# Patient Record
Sex: MALE | Race: WHITE | Employment: FULL TIME
[De-identification: names, ages, dates, MRNs, and addresses within clinical notes are randomized per-mention and may not be internally consistent; named-entity substitution may affect disease eponyms.]

---

## 2018-12-04 ENCOUNTER — NURSE TRIAGE (OUTPATIENT)
Dept: OTHER | Facility: CLINIC | Age: 28
End: 2018-12-04

## 2018-12-04 ENCOUNTER — APPOINTMENT (OUTPATIENT)
Dept: CT IMAGING | Age: 28
End: 2018-12-04
Payer: COMMERCIAL

## 2018-12-04 ENCOUNTER — HOSPITAL ENCOUNTER (EMERGENCY)
Age: 28
Discharge: HOME | End: 2018-12-04
Attending: EMERGENCY MEDICINE
Payer: COMMERCIAL

## 2018-12-04 VITALS
BODY MASS INDEX: 36.45 KG/M2 | HEART RATE: 97 BPM | DIASTOLIC BLOOD PRESSURE: 62 MMHG | RESPIRATION RATE: 16 BRPM | HEIGHT: 78 IN | TEMPERATURE: 99.2 F | OXYGEN SATURATION: 100 % | SYSTOLIC BLOOD PRESSURE: 133 MMHG | WEIGHT: 315 LBS

## 2018-12-04 DIAGNOSIS — R16.1 SPLENOMEGALY: ICD-10-CM

## 2018-12-04 DIAGNOSIS — L03.319 CELLULITIS OF PUBIC REGION: Primary | ICD-10-CM

## 2018-12-04 DIAGNOSIS — D72.819 LEUKOPENIA, UNSPECIFIED TYPE: ICD-10-CM

## 2018-12-04 LAB
A/G RATIO: 1.3 (ref 1.1–2.2)
ALBUMIN SERPL-MCNC: 4.3 G/DL (ref 3.4–5)
ALP BLD-CCNC: 73 U/L (ref 40–129)
ALT SERPL-CCNC: 34 U/L (ref 10–40)
ANION GAP SERPL CALCULATED.3IONS-SCNC: 10 MMOL/L (ref 3–16)
AST SERPL-CCNC: 30 U/L (ref 15–37)
BASOPHILS ABSOLUTE: 0 K/UL (ref 0–0.2)
BASOPHILS RELATIVE PERCENT: 2.3 %
BILIRUB SERPL-MCNC: 1.4 MG/DL (ref 0–1)
BUN BLDV-MCNC: 13 MG/DL (ref 7–20)
CALCIUM SERPL-MCNC: 9.3 MG/DL (ref 8.3–10.6)
CHLORIDE BLD-SCNC: 103 MMOL/L (ref 99–110)
CO2: 26 MMOL/L (ref 21–32)
CREAT SERPL-MCNC: 0.8 MG/DL (ref 0.9–1.3)
EOSINOPHILS ABSOLUTE: 0 K/UL (ref 0–0.6)
EOSINOPHILS RELATIVE PERCENT: 0.5 %
GFR AFRICAN AMERICAN: >60
GFR NON-AFRICAN AMERICAN: >60
GLOBULIN: 3.4 G/DL
GLUCOSE BLD-MCNC: 106 MG/DL (ref 70–99)
HCT VFR BLD CALC: 31.6 % (ref 40.5–52.5)
HEMOGLOBIN: 10.7 G/DL (ref 13.5–17.5)
LACTIC ACID: 0.8 MMOL/L (ref 0.4–2)
LYMPHOCYTES ABSOLUTE: 0.7 K/UL (ref 1–5.1)
LYMPHOCYTES RELATIVE PERCENT: 51.2 %
MCH RBC QN AUTO: 30.1 PG (ref 26–34)
MCHC RBC AUTO-ENTMCNC: 34 G/DL (ref 31–36)
MCV RBC AUTO: 88.6 FL (ref 80–100)
MONOCYTES ABSOLUTE: 0.3 K/UL (ref 0–1.3)
MONOCYTES RELATIVE PERCENT: 22.9 %
NEUTROPHILS ABSOLUTE: 0.3 K/UL (ref 1.7–7.7)
NEUTROPHILS RELATIVE PERCENT: 23.1 %
PDW BLD-RTO: 14.7 % (ref 12.4–15.4)
PLATELET # BLD: 188 K/UL (ref 135–450)
PMV BLD AUTO: 8 FL (ref 5–10.5)
POTASSIUM REFLEX MAGNESIUM: 4.2 MMOL/L (ref 3.5–5.1)
RBC # BLD: 3.56 M/UL (ref 4.2–5.9)
SODIUM BLD-SCNC: 139 MMOL/L (ref 136–145)
TOTAL PROTEIN: 7.7 G/DL (ref 6.4–8.2)
WBC # BLD: 1.3 K/UL (ref 4–11)

## 2018-12-04 PROCEDURE — 6370000000 HC RX 637 (ALT 250 FOR IP): Performed by: PHYSICIAN ASSISTANT

## 2018-12-04 PROCEDURE — 74177 CT ABD & PELVIS W/CONTRAST: CPT

## 2018-12-04 PROCEDURE — 83605 ASSAY OF LACTIC ACID: CPT

## 2018-12-04 PROCEDURE — 6360000004 HC RX CONTRAST MEDICATION: Performed by: EMERGENCY MEDICINE

## 2018-12-04 PROCEDURE — 96366 THER/PROPH/DIAG IV INF ADDON: CPT

## 2018-12-04 PROCEDURE — 99284 EMERGENCY DEPT VISIT MOD MDM: CPT

## 2018-12-04 PROCEDURE — 6360000002 HC RX W HCPCS

## 2018-12-04 PROCEDURE — 87040 BLOOD CULTURE FOR BACTERIA: CPT

## 2018-12-04 PROCEDURE — 2580000003 HC RX 258: Performed by: PHYSICIAN ASSISTANT

## 2018-12-04 PROCEDURE — 96375 TX/PRO/DX INJ NEW DRUG ADDON: CPT

## 2018-12-04 PROCEDURE — 96367 TX/PROPH/DG ADDL SEQ IV INF: CPT

## 2018-12-04 PROCEDURE — 6360000002 HC RX W HCPCS: Performed by: PHYSICIAN ASSISTANT

## 2018-12-04 PROCEDURE — 80053 COMPREHEN METABOLIC PANEL: CPT

## 2018-12-04 PROCEDURE — 96365 THER/PROPH/DIAG IV INF INIT: CPT

## 2018-12-04 PROCEDURE — 85025 COMPLETE CBC W/AUTO DIFF WBC: CPT

## 2018-12-04 PROCEDURE — 36415 COLL VENOUS BLD VENIPUNCTURE: CPT

## 2018-12-04 RX ORDER — ONDANSETRON 2 MG/ML
INJECTION INTRAMUSCULAR; INTRAVENOUS
Status: COMPLETED
Start: 2018-12-04 | End: 2018-12-04

## 2018-12-04 RX ORDER — ONDANSETRON 2 MG/ML
4 INJECTION INTRAMUSCULAR; INTRAVENOUS ONCE
Status: COMPLETED | OUTPATIENT
Start: 2018-12-04 | End: 2018-12-04

## 2018-12-04 RX ORDER — 0.9 % SODIUM CHLORIDE 0.9 %
1000 INTRAVENOUS SOLUTION INTRAVENOUS ONCE
Status: COMPLETED | OUTPATIENT
Start: 2018-12-04 | End: 2018-12-04

## 2018-12-04 RX ORDER — CEPHALEXIN 500 MG/1
500 CAPSULE ORAL 4 TIMES DAILY
Qty: 40 CAPSULE | Refills: 0 | Status: SHIPPED | OUTPATIENT
Start: 2018-12-04 | End: 2018-12-14

## 2018-12-04 RX ORDER — KETOROLAC TROMETHAMINE 30 MG/ML
30 INJECTION, SOLUTION INTRAMUSCULAR; INTRAVENOUS ONCE
Status: COMPLETED | OUTPATIENT
Start: 2018-12-04 | End: 2018-12-04

## 2018-12-04 RX ORDER — SULFAMETHOXAZOLE AND TRIMETHOPRIM 800; 160 MG/1; MG/1
1 TABLET ORAL 2 TIMES DAILY
Qty: 20 TABLET | Refills: 0 | Status: SHIPPED | OUTPATIENT
Start: 2018-12-04 | End: 2018-12-14

## 2018-12-04 RX ORDER — ACETAMINOPHEN 325 MG/1
650 TABLET ORAL ONCE
Status: COMPLETED | OUTPATIENT
Start: 2018-12-04 | End: 2018-12-04

## 2018-12-04 RX ADMIN — SODIUM CHLORIDE 1000 ML: 9 INJECTION, SOLUTION INTRAVENOUS at 17:01

## 2018-12-04 RX ADMIN — IOPAMIDOL 75 ML: 755 INJECTION, SOLUTION INTRAVENOUS at 18:53

## 2018-12-04 RX ADMIN — KETOROLAC TROMETHAMINE 30 MG: 30 INJECTION, SOLUTION INTRAMUSCULAR at 17:01

## 2018-12-04 RX ADMIN — ONDANSETRON 4 MG: 2 INJECTION INTRAMUSCULAR; INTRAVENOUS at 17:01

## 2018-12-04 RX ADMIN — PIPERACILLIN SODIUM AND TAZOBACTAM SODIUM 3.38 G: 3; .375 INJECTION, POWDER, LYOPHILIZED, FOR SOLUTION INTRAVENOUS at 17:02

## 2018-12-04 RX ADMIN — VANCOMYCIN HYDROCHLORIDE 1000 MG: 1 INJECTION, POWDER, LYOPHILIZED, FOR SOLUTION INTRAVENOUS at 17:42

## 2018-12-04 RX ADMIN — ACETAMINOPHEN 650 MG: 325 TABLET ORAL at 17:01

## 2018-12-04 ASSESSMENT — PAIN SCALES - GENERAL
PAINLEVEL_OUTOF10: 5
PAINLEVEL_OUTOF10: 0

## 2018-12-04 ASSESSMENT — PAIN DESCRIPTION - ORIENTATION: ORIENTATION: RIGHT

## 2018-12-04 ASSESSMENT — PAIN DESCRIPTION - DESCRIPTORS: DESCRIPTORS: ACHING

## 2018-12-04 ASSESSMENT — ENCOUNTER SYMPTOMS
VOMITING: 0
ABDOMINAL PAIN: 0
SHORTNESS OF BREATH: 0
COLOR CHANGE: 1

## 2018-12-04 ASSESSMENT — PAIN DESCRIPTION - FREQUENCY: FREQUENCY: CONTINUOUS

## 2018-12-04 ASSESSMENT — PAIN DESCRIPTION - LOCATION: LOCATION: GROIN

## 2018-12-04 NOTE — LETTER
[facility] Emergency Department  Vanessa Zambrano [address]  [address]  Phone: [phone]               December 4, 2018    Patient: Chrissy Overton   YOB: 1990   Date of Visit: 12/4/2018       To Whom It May Concern:    Rex Castillo was seen and treated in our emergency department on 12/4/2018. Please excuse from work 12/4-12-6.       Sincerely,       Shasta Medrano PA-C         Signature:__________________________________

## 2018-12-05 ENCOUNTER — OFFICE VISIT (OUTPATIENT)
Dept: FAMILY MEDICINE CLINIC | Age: 28
End: 2018-12-05
Payer: COMMERCIAL

## 2018-12-05 ENCOUNTER — TELEPHONE (OUTPATIENT)
Dept: FAMILY MEDICINE CLINIC | Age: 28
End: 2018-12-05

## 2018-12-05 VITALS
SYSTOLIC BLOOD PRESSURE: 138 MMHG | WEIGHT: 315 LBS | HEIGHT: 78 IN | BODY MASS INDEX: 36.45 KG/M2 | TEMPERATURE: 98.5 F | DIASTOLIC BLOOD PRESSURE: 86 MMHG | HEART RATE: 84 BPM

## 2018-12-05 DIAGNOSIS — R16.1 SPLENOMEGALY: ICD-10-CM

## 2018-12-05 DIAGNOSIS — D70.9 NEUTROPENIA, UNSPECIFIED TYPE (HCC): Primary | ICD-10-CM

## 2018-12-05 DIAGNOSIS — D72.819 LEUKOPENIA, UNSPECIFIED TYPE: ICD-10-CM

## 2018-12-05 DIAGNOSIS — L03.319 CELLULITIS OF PUBIC REGION: ICD-10-CM

## 2018-12-05 PROCEDURE — 36415 COLL VENOUS BLD VENIPUNCTURE: CPT | Performed by: NURSE PRACTITIONER

## 2018-12-05 PROCEDURE — 99204 OFFICE O/P NEW MOD 45 MIN: CPT | Performed by: NURSE PRACTITIONER

## 2018-12-05 NOTE — PATIENT INSTRUCTIONS
feel when you first quit smokeless tobacco are uncomfortable. Your body will miss the nicotine at first, and you may feel short-tempered and grumpy. You may have trouble sleeping or concentrating. Medicine can help you deal with these symptoms. You may struggle with changing your habits and rituals. The last step is the tricky one: Be prepared for the urge to use smokeless tobacco to continue for a time. This is a lot to deal with, but keep at it. You will feel better. Follow-up care is a key part of your treatment and safety. Be sure to make and go to all appointments, and call your doctor if you are having problems. It's also a good idea to know your test results and keep a list of the medicines you take. How can you care for yourself at home? · Ask your family, friends, and coworkers for support. You have a better chance of quitting if you have help and support. · Join a support group for people who are trying to quit using smokeless tobacco.  · Set a quit date. Pick your date carefully so that it is not right in the middle of a big deadline or stressful time. After you quit, do not use smokeless tobacco even once. Get rid of all spit cups, cans, and pouches after your last use. Clean your house and your clothes so that they do not smell of tobacco.  · Learn how to be a non-user. Think about ways you can avoid those things that make you reach for tobacco.  ? Learn some ways to deal with cravings, like calling a friend or going for a walk. Cravings often pass. ? Avoid situations that put you at greatest risk for using smokeless tobacco. For some people, it is hard to spend time with friends without dipping or chewing. For others, they might skip a coffee break with coworkers who smoke or use smokeless tobacco.  ? Change your daily routine. Take a different route to work, or eat a meal in a different place. · Cut down on stress.  Calm yourself or release tension by doing an activity you enjoy, such as reading a book, taking a hot bath, or gardening. · Talk to your doctor or pharmacist about nicotine replacement therapy. You still get nicotine, but you do not use tobacco. Nicotine replacement products help you slowly reduce the amount of nicotine you need. Many of these products are available over the counter. They include nicotine patches, gum, lozenges, and inhalers. · Ask your doctor about bupropion (Wellbutrin) or varenicline (Chantix), which are prescription medicines. They do not contain nicotine. They help you by reducing withdrawal symptoms, such as stress and anxiety. · Get regular exercise. Having healthy habits will help your body move past its craving for nicotine. · Be prepared to keep trying. Most people are not successful the first few times they try to quit. Do not get mad at yourself if you use tobacco again. Make a list of things you learned, and think about when you want to try again, such as next week, next month, or next year. Where can you learn more? Go to https://mPura.Inventalator. org and sign in to your TheSedge.org account. Enter C919 in the [employer] box to learn more about \"Stopping Smokeless Tobacco Use: Care Instructions. \"     If you do not have an account, please click on the \"Sign Up Now\" link. Current as of: November 29, 2017  Content Version: 11.8  © 5834-2140 Healthwise, Incorporated. Care instructions adapted under license by [facility] ([facility]). If you have questions about a medical condition or this instruction, always ask your healthcare professional. [place] [address] any warranty or liability for your use of this information.

## 2018-12-06 LAB
ANA INTERPRETATION: NORMAL
ANTI-NUCLEAR ANTIBODY (ANA): NEGATIVE
BASOPHILS ABSOLUTE: 0 K/UL (ref 0–0.2)
BASOPHILS RELATIVE PERCENT: 0.1 %
BLOOD SMEAR REVIEW: NORMAL
EOSINOPHILS ABSOLUTE: 0 K/UL (ref 0–0.6)
EOSINOPHILS RELATIVE PERCENT: 1.4 %
FERRITIN: 526.1 NG/ML (ref 30–400)
FOLATE: 7.21 NG/ML (ref 4.78–24.2)
HAV IGM SER IA-ACNC: NORMAL
HCT VFR BLD CALC: 32.7 % (ref 40.5–52.5)
HEMATOLOGY PATH CONSULT: NORMAL
HEMATOLOGY PATH CONSULT: YES
HEMOGLOBIN: 10.8 G/DL (ref 13.5–17.5)
HEPATITIS B CORE IGM ANTIBODY: NORMAL
HEPATITIS B SURFACE ANTIGEN INTERPRETATION: NORMAL
HEPATITIS C ANTIBODY INTERPRETATION: NORMAL
HIV AG/AB: NORMAL
HIV ANTIGEN: NORMAL
HIV-1 ANTIBODY: NORMAL
HIV-2 AB: NORMAL
IRON SATURATION: 16 % (ref 20–50)
IRON: 53 UG/DL (ref 59–158)
LYMPHOCYTES ABSOLUTE: 0.7 K/UL (ref 1–5.1)
LYMPHOCYTES RELATIVE PERCENT: 61.8 %
MCH RBC QN AUTO: 30.3 PG (ref 26–34)
MCHC RBC AUTO-ENTMCNC: 33.1 G/DL (ref 31–36)
MCV RBC AUTO: 91.6 FL (ref 80–100)
MONOCYTES ABSOLUTE: 0.2 K/UL (ref 0–1.3)
MONOCYTES RELATIVE PERCENT: 19.1 %
NEUTROPHILS ABSOLUTE: 0.2 K/UL (ref 1.7–7.7)
NEUTROPHILS RELATIVE PERCENT: 17.6 %
PDW BLD-RTO: 15.2 % (ref 12.4–15.4)
PLATELET # BLD: 176 K/UL (ref 135–450)
PMV BLD AUTO: 8.9 FL (ref 5–10.5)
RBC # BLD: 3.57 M/UL (ref 4.2–5.9)
TOTAL IRON BINDING CAPACITY: 325 UG/DL (ref 260–445)
TOTAL SYPHILLIS IGG/IGM: NORMAL
TSH REFLEX: 1.19 UIU/ML (ref 0.27–4.2)
VITAMIN B-12: 623 PG/ML (ref 211–911)
WBC # BLD: 1.1 K/UL (ref 4–11)

## 2018-12-07 DIAGNOSIS — R79.0 ABNORMAL BLOOD LEVEL OF IRON: ICD-10-CM

## 2018-12-07 DIAGNOSIS — D72.9 ABNORMAL WHITE BLOOD CELL COUNT: Primary | ICD-10-CM

## 2018-12-08 LAB
CYTOMEGALOVIRUS IGG ANTIBODY: <0.2 U/ML
CYTOMEGALOVIRUS IGM ANTIBODY: <8 AU/ML
EPSTEIN BARR VIRUS NUCLEAR AB IGG: <3 U/ML (ref 0–21.9)
EPSTEIN-BARR EARLY ANTIGEN ANTIBODY: <5 U/ML (ref 0–10.9)
EPSTEIN-BARR VCA IGG: <10 U/ML (ref 0–21.9)
EPSTEIN-BARR VCA IGM: 17.5 U/ML (ref 0–43.9)
LYME, EIA: 0.55 LIV (ref 0–1.2)

## 2018-12-09 LAB
HISTOPLASMA ANTIGEN, SERUM: <2 U/ML
HISTOPLASMA INTERPETATION: NEGATIVE
ROCKY MOUNTAIN SPOTTED FEVER AB IGM,: NORMAL
ROCKY MOUNTAIN SPOTTED FEVER ANTIBODY IGG: NORMAL

## 2018-12-10 ENCOUNTER — TELEPHONE (OUTPATIENT)
Dept: FAMILY MEDICINE CLINIC | Age: 28
End: 2018-12-10

## 2018-12-10 ENCOUNTER — NURSE ONLY (OUTPATIENT)
Dept: FAMILY MEDICINE CLINIC | Age: 28
End: 2018-12-10
Payer: COMMERCIAL

## 2018-12-10 DIAGNOSIS — D72.819 LEUKOPENIA, UNSPECIFIED TYPE: Primary | ICD-10-CM

## 2018-12-10 LAB
BASOPHILS ABSOLUTE: 0 K/UL (ref 0–0.2)
BASOPHILS RELATIVE PERCENT: 2 %
BLOOD CULTURE, ROUTINE: NORMAL
CULTURE, BLOOD 2: NORMAL
EOSINOPHILS ABSOLUTE: 0 K/UL (ref 0–0.6)
EOSINOPHILS RELATIVE PERCENT: 2 %
HCT VFR BLD CALC: 32.6 % (ref 40.5–52.5)
HEMATOLOGY PATH CONSULT: NO
HEMOGLOBIN: 11 G/DL (ref 13.5–17.5)
LYMPHOCYTES ABSOLUTE: 0.8 K/UL (ref 1–5.1)
LYMPHOCYTES RELATIVE PERCENT: 61 %
MCH RBC QN AUTO: 29.8 PG (ref 26–34)
MCHC RBC AUTO-ENTMCNC: 33.7 G/DL (ref 31–36)
MCV RBC AUTO: 88.6 FL (ref 80–100)
MONOCYTES ABSOLUTE: 0.2 K/UL (ref 0–1.3)
MONOCYTES RELATIVE PERCENT: 15 %
NEUTROPHILS ABSOLUTE: 0.3 K/UL (ref 1.7–7.7)
NEUTROPHILS RELATIVE PERCENT: 20 %
PDW BLD-RTO: 14.7 % (ref 12.4–15.4)
PLATELET # BLD: 209 K/UL (ref 135–450)
PLATELET SLIDE REVIEW: ADEQUATE
PMV BLD AUTO: 8.6 FL (ref 5–10.5)
RBC # BLD: 3.68 M/UL (ref 4.2–5.9)
RBC # BLD: NORMAL 10*6/UL
SLIDE REVIEW: ABNORMAL
WBC # BLD: 1.3 K/UL (ref 4–11)

## 2018-12-10 PROCEDURE — 36415 COLL VENOUS BLD VENIPUNCTURE: CPT | Performed by: NURSE PRACTITIONER

## 2018-12-11 NOTE — TELEPHONE ENCOUNTER
Spoke w/ patient, he wanted to know more about his blood work and what it was showing today vs the 4th. He wanting to know if the blood work indicated signs of cancer. Informed patient of what results we had, informed him he would have to defer additional testing to hematology as I do not have any results showing he has cancer in the blood work or the CT scan - informed him to bring a list of questions to his appointments     Discussed with patient if the hematologist did think it was a form of cancer, they would be the one to order additional testing via either additional blood work, scans and or bx in order to make that determination. Asked pt how his abscess was doing - pt stated it was slowly healing but overall doing some better.

## 2018-12-13 ENCOUNTER — OFFICE VISIT (OUTPATIENT)
Dept: FAMILY MEDICINE CLINIC | Age: 28
End: 2018-12-13
Payer: COMMERCIAL

## 2018-12-13 ENCOUNTER — HOSPITAL ENCOUNTER (OUTPATIENT)
Age: 28
Discharge: HOME | End: 2018-12-13
Payer: COMMERCIAL

## 2018-12-13 VITALS
OXYGEN SATURATION: 100 % | HEIGHT: 78 IN | WEIGHT: 315 LBS | SYSTOLIC BLOOD PRESSURE: 144 MMHG | DIASTOLIC BLOOD PRESSURE: 85 MMHG | HEART RATE: 115 BPM | BODY MASS INDEX: 36.45 KG/M2

## 2018-12-13 DIAGNOSIS — L02.214 CUTANEOUS ABSCESS OF GROIN: ICD-10-CM

## 2018-12-13 DIAGNOSIS — D72.819 LEUKOPENIA, UNSPECIFIED TYPE: Primary | ICD-10-CM

## 2018-12-13 DIAGNOSIS — R16.1 SPLENOMEGALY: ICD-10-CM

## 2018-12-13 DIAGNOSIS — L03.319 CELLULITIS OF PUBIC REGION: ICD-10-CM

## 2018-12-13 LAB — TSH SERPL DL<=0.05 MIU/L-ACNC: 0.57 UIU/ML (ref 0.27–4.2)

## 2018-12-13 PROCEDURE — 86431 RHEUMATOID FACTOR QUANT: CPT

## 2018-12-13 PROCEDURE — 84165 PROTEIN E-PHORESIS SERUM: CPT

## 2018-12-13 PROCEDURE — 36415 COLL VENOUS BLD VENIPUNCTURE: CPT

## 2018-12-13 PROCEDURE — 99212 OFFICE O/P EST SF 10 MIN: CPT | Performed by: NURSE PRACTITIONER

## 2018-12-13 PROCEDURE — 86038 ANTINUCLEAR ANTIBODIES: CPT

## 2018-12-13 PROCEDURE — 84155 ASSAY OF PROTEIN SERUM: CPT

## 2018-12-13 PROCEDURE — 84238 ASSAY NONENDOCRINE RECEPTOR: CPT

## 2018-12-13 PROCEDURE — 82525 ASSAY OF COPPER: CPT

## 2018-12-13 PROCEDURE — 84443 ASSAY THYROID STIM HORMONE: CPT

## 2018-12-13 ASSESSMENT — ENCOUNTER SYMPTOMS
ROS SKIN COMMENTS: ABSCESS
EYES NEGATIVE: 1
GASTROINTESTINAL NEGATIVE: 1
RESPIRATORY NEGATIVE: 1

## 2018-12-13 ASSESSMENT — PATIENT HEALTH QUESTIONNAIRE - PHQ9
SUM OF ALL RESPONSES TO PHQ QUESTIONS 1-9: 0
SUM OF ALL RESPONSES TO PHQ9 QUESTIONS 1 & 2: 0
SUM OF ALL RESPONSES TO PHQ QUESTIONS 1-9: 0
1. LITTLE INTEREST OR PLEASURE IN DOING THINGS: 0
2. FEELING DOWN, DEPRESSED OR HOPELESS: 0

## 2018-12-13 NOTE — PROGRESS NOTES
Hearing, tympanic membrane and ear canal normal.   Nose: Nose normal.   Mouth/Throat: Uvula is midline, oropharynx is clear and moist and mucous membranes are normal.   Eyes: Conjunctivae and lids are normal.   Neck: Neck supple. Cardiovascular: Normal rate, regular rhythm, normal heart sounds and normal pulses. Pulmonary/Chest: Effort normal and breath sounds normal. No accessory muscle usage. No respiratory distress. Abdominal: Soft. Normal appearance and bowel sounds are normal. There is no tenderness. obese   Lymphadenopathy:        Head (right side): No submental and no submandibular adenopathy present. Head (left side): No submental and no submandibular adenopathy present. He has no cervical adenopathy. Neurological: He is alert and oriented to person, place, and time. Skin: Skin is warm and dry. No lesion and no rash noted. Psychiatric: He has a normal mood and affect. His speech is normal and behavior is normal. Cognition and memory are normal.       BP (!) 144/85 (Site: Left Upper Arm, Position: Sitting, Cuff Size: Large Adult)   Pulse 115   Ht 6' 6\" (1.981 m)   Wt (!) 430 lb (195 kg)   SpO2 100%   BMI 49.69 kg/m²   Body mass index is 49.69 kg/m².     BP Readings from Last 2 Encounters:   12/13/18 (!) 144/85   12/05/18 138/86       Wt Readings from Last 3 Encounters:   12/13/18 (!) 430 lb (195 kg)   12/05/18 (!) 434 lb (196.9 kg)   12/04/18 (!) 380 lb (172.4 kg)       Lab Review   Nurse Only on 12/10/2018   Component Date Value    WBC 12/10/2018 1.3*    RBC 12/10/2018 3.68*    Hemoglobin 12/10/2018 11.0*    Hematocrit 12/10/2018 32.6*    MCV 12/10/2018 88.6     MCH 12/10/2018 29.8     MCHC 12/10/2018 33.7     RDW 12/10/2018 14.7     Platelets 29/56/4431 209     MPV 12/10/2018 8.6     PLATELET SLIDE REVIEW 12/10/2018 Adequate     SLIDE REVIEW 12/10/2018 see below     Path Consult 12/10/2018 No     Neutrophils % 12/10/2018 20.0     Lymphocytes % 12/10/2018

## 2018-12-14 LAB
ALBUMIN SERPL-MCNC: 3.8 G/DL (ref 3.1–4.9)
ALPHA-1-GLOBULIN: 0.3 G/DL (ref 0.2–0.4)
ALPHA-2-GLOBULIN: 0.6 G/DL (ref 0.4–1.1)
ANA INTERPRETATION: NORMAL
ANTI-NUCLEAR ANTIBODY (ANA): NEGATIVE
BETA GLOBULIN: 1.2 G/DL (ref 0.9–1.6)
GAMMA GLOBULIN: 1.7 G/DL (ref 0.6–1.8)
RHEUMATOID FACTOR: <10 IU/ML
SPE/IFE INTERPRETATION: NORMAL
TOTAL PROTEIN: 7.6 G/DL (ref 6.4–8.2)

## 2018-12-15 LAB — SOLUBLE TRANSFERRIN RECEPT: 11.4 MG/L (ref 2.2–5)

## 2018-12-17 LAB — COPPER: 165 UG/DL (ref 70–140)

## 2018-12-19 ENCOUNTER — HOSPITAL ENCOUNTER (OUTPATIENT)
Age: 28
Discharge: HOME | End: 2018-12-19
Payer: COMMERCIAL

## 2018-12-19 ENCOUNTER — HOSPITAL ENCOUNTER (OUTPATIENT)
Dept: CT IMAGING | Age: 28
Discharge: HOME | End: 2018-12-19
Payer: COMMERCIAL

## 2018-12-19 VITALS
HEART RATE: 85 BPM | DIASTOLIC BLOOD PRESSURE: 97 MMHG | OXYGEN SATURATION: 93 % | SYSTOLIC BLOOD PRESSURE: 175 MMHG | RESPIRATION RATE: 19 BRPM

## 2018-12-19 DIAGNOSIS — D72.819 CHRONIC LEUKOPENIA: ICD-10-CM

## 2018-12-19 LAB
INR BLD: 1.09 (ref 0.86–1.14)
PROTHROMBIN TIME: 12.4 SEC (ref 9.8–13)

## 2018-12-19 PROCEDURE — 88311 DECALCIFY TISSUE: CPT

## 2018-12-19 PROCEDURE — 88313 SPECIAL STAINS GROUP 2: CPT

## 2018-12-19 PROCEDURE — 88184 FLOWCYTOMETRY/ TC 1 MARKER: CPT

## 2018-12-19 PROCEDURE — 88185 FLOWCYTOMETRY/TC ADD-ON: CPT

## 2018-12-19 PROCEDURE — 88305 TISSUE EXAM BY PATHOLOGIST: CPT

## 2018-12-19 PROCEDURE — 77012 CT SCAN FOR NEEDLE BIOPSY: CPT

## 2018-12-19 PROCEDURE — 88341 IMHCHEM/IMCYTCHM EA ADD ANTB: CPT

## 2018-12-19 PROCEDURE — 85610 PROTHROMBIN TIME: CPT

## 2018-12-19 PROCEDURE — 88342 IMHCHEM/IMCYTCHM 1ST ANTB: CPT

## 2018-12-19 PROCEDURE — 2709999900 CT BIOPSY BONE MARROW

## 2018-12-19 PROCEDURE — 88360 TUMOR IMMUNOHISTOCHEM/MANUAL: CPT

## 2018-12-19 PROCEDURE — 36415 COLL VENOUS BLD VENIPUNCTURE: CPT

## 2018-12-20 ASSESSMENT — ENCOUNTER SYMPTOMS
GASTROINTESTINAL NEGATIVE: 1
EYES NEGATIVE: 1
RESPIRATORY NEGATIVE: 1

## 2019-01-03 ENCOUNTER — TELEPHONE (OUTPATIENT)
Dept: FAMILY MEDICINE CLINIC | Age: 29
End: 2019-01-03

## 2019-01-03 RX ORDER — SULFAMETHOXAZOLE AND TRIMETHOPRIM 800; 160 MG/1; MG/1
1 TABLET ORAL 2 TIMES DAILY
Qty: 20 TABLET | Refills: 0 | Status: SHIPPED | OUTPATIENT
Start: 2019-01-03 | End: 2019-01-13

## 2019-01-04 ENCOUNTER — TELEPHONE (OUTPATIENT)
Dept: FAMILY MEDICINE CLINIC | Age: 29
End: 2019-01-04

## 2019-02-22 ENCOUNTER — APPOINTMENT (OUTPATIENT)
Dept: GENERAL RADIOLOGY | Age: 29
End: 2019-02-22
Payer: COMMERCIAL

## 2019-02-22 ENCOUNTER — HOSPITAL ENCOUNTER (EMERGENCY)
Age: 29
Discharge: HOME | End: 2019-02-22
Attending: EMERGENCY MEDICINE
Payer: COMMERCIAL

## 2019-02-22 ENCOUNTER — APPOINTMENT (OUTPATIENT)
Dept: CT IMAGING | Age: 29
End: 2019-02-22
Payer: COMMERCIAL

## 2019-02-22 VITALS
BODY MASS INDEX: 36.45 KG/M2 | DIASTOLIC BLOOD PRESSURE: 81 MMHG | HEART RATE: 84 BPM | SYSTOLIC BLOOD PRESSURE: 136 MMHG | HEIGHT: 78 IN | WEIGHT: 315 LBS | TEMPERATURE: 98.1 F | OXYGEN SATURATION: 100 % | RESPIRATION RATE: 20 BRPM

## 2019-02-22 DIAGNOSIS — S39.91XA BLUNT ABDOMINAL TRAUMA, INITIAL ENCOUNTER: ICD-10-CM

## 2019-02-22 DIAGNOSIS — R10.12 ABDOMINAL PAIN, LEFT UPPER QUADRANT: Primary | ICD-10-CM

## 2019-02-22 DIAGNOSIS — D72.819 LEUKOPENIA, UNSPECIFIED TYPE: ICD-10-CM

## 2019-02-22 DIAGNOSIS — R16.1 SPLENOMEGALY: ICD-10-CM

## 2019-02-22 LAB
A/G RATIO: 1.4 (ref 1.1–2.2)
ALBUMIN SERPL-MCNC: 4.7 G/DL (ref 3.4–5)
ALP BLD-CCNC: 63 U/L (ref 40–129)
ALT SERPL-CCNC: 49 U/L (ref 10–40)
AMYLASE: 32 U/L (ref 25–115)
ANION GAP SERPL CALCULATED.3IONS-SCNC: 12 MMOL/L (ref 3–16)
AST SERPL-CCNC: 49 U/L (ref 15–37)
BASOPHILS ABSOLUTE: 0.1 K/UL (ref 0–0.2)
BASOPHILS RELATIVE PERCENT: 2.5 %
BILIRUB SERPL-MCNC: 0.7 MG/DL (ref 0–1)
BUN BLDV-MCNC: 16 MG/DL (ref 7–20)
CALCIUM SERPL-MCNC: 9.5 MG/DL (ref 8.3–10.6)
CHLORIDE BLD-SCNC: 105 MMOL/L (ref 99–110)
CO2: 26 MMOL/L (ref 21–32)
CREAT SERPL-MCNC: 0.8 MG/DL (ref 0.9–1.3)
EOSINOPHILS ABSOLUTE: 0 K/UL (ref 0–0.6)
EOSINOPHILS RELATIVE PERCENT: 1.2 %
GFR AFRICAN AMERICAN: >60
GFR NON-AFRICAN AMERICAN: >60
GLOBULIN: 3.4 G/DL
GLUCOSE BLD-MCNC: 122 MG/DL (ref 70–99)
HCT VFR BLD CALC: 39.2 % (ref 40.5–52.5)
HEMOGLOBIN: 12.9 G/DL (ref 13.5–17.5)
LIPASE: 22 U/L (ref 13–60)
LYMPHOCYTES ABSOLUTE: 1 K/UL (ref 1–5.1)
LYMPHOCYTES RELATIVE PERCENT: 40.9 %
MCH RBC QN AUTO: 28.5 PG (ref 26–34)
MCHC RBC AUTO-ENTMCNC: 32.9 G/DL (ref 31–36)
MCV RBC AUTO: 86.6 FL (ref 80–100)
MONOCYTES ABSOLUTE: 0.3 K/UL (ref 0–1.3)
MONOCYTES RELATIVE PERCENT: 10.5 %
NEUTROPHILS ABSOLUTE: 1.1 K/UL (ref 1.7–7.7)
NEUTROPHILS RELATIVE PERCENT: 44.9 %
PDW BLD-RTO: 14.4 % (ref 12.4–15.4)
PLATELET # BLD: 226 K/UL (ref 135–450)
PMV BLD AUTO: 8 FL (ref 5–10.5)
POTASSIUM SERPL-SCNC: 4 MMOL/L (ref 3.5–5.1)
RBC # BLD: 4.52 M/UL (ref 4.2–5.9)
SODIUM BLD-SCNC: 143 MMOL/L (ref 136–145)
TOTAL PROTEIN: 8.1 G/DL (ref 6.4–8.2)
WBC # BLD: 2.4 K/UL (ref 4–11)

## 2019-02-22 PROCEDURE — 82150 ASSAY OF AMYLASE: CPT

## 2019-02-22 PROCEDURE — 99284 EMERGENCY DEPT VISIT MOD MDM: CPT

## 2019-02-22 PROCEDURE — 74177 CT ABD & PELVIS W/CONTRAST: CPT

## 2019-02-22 PROCEDURE — 85025 COMPLETE CBC W/AUTO DIFF WBC: CPT

## 2019-02-22 PROCEDURE — 83690 ASSAY OF LIPASE: CPT

## 2019-02-22 PROCEDURE — 36415 COLL VENOUS BLD VENIPUNCTURE: CPT

## 2019-02-22 PROCEDURE — 80053 COMPREHEN METABOLIC PANEL: CPT

## 2019-02-22 PROCEDURE — 6360000004 HC RX CONTRAST MEDICATION: Performed by: EMERGENCY MEDICINE

## 2019-02-22 PROCEDURE — 73060 X-RAY EXAM OF HUMERUS: CPT

## 2019-02-22 RX ADMIN — IOPAMIDOL 75 ML: 755 INJECTION, SOLUTION INTRAVENOUS at 18:05

## 2019-02-22 ASSESSMENT — PAIN SCALES - GENERAL
PAINLEVEL_OUTOF10: 3
PAINLEVEL_OUTOF10: 3

## 2019-02-22 ASSESSMENT — PAIN DESCRIPTION - FREQUENCY
FREQUENCY: INTERMITTENT
FREQUENCY: INTERMITTENT

## 2019-02-22 ASSESSMENT — PAIN DESCRIPTION - LOCATION
LOCATION: ABDOMEN
LOCATION: ABDOMEN

## 2019-02-22 ASSESSMENT — PAIN DESCRIPTION - PAIN TYPE
TYPE: ACUTE PAIN
TYPE: ACUTE PAIN

## 2019-02-22 ASSESSMENT — PAIN DESCRIPTION - ORIENTATION
ORIENTATION: LEFT;UPPER
ORIENTATION: LEFT;UPPER

## 2019-02-22 ASSESSMENT — PAIN - FUNCTIONAL ASSESSMENT: PAIN_FUNCTIONAL_ASSESSMENT: 0-10

## 2019-02-22 ASSESSMENT — PAIN DESCRIPTION - DESCRIPTORS
DESCRIPTORS: ACHING;DULL;SHARP;DISCOMFORT
DESCRIPTORS: ACHING;DISCOMFORT

## 2019-03-05 ENCOUNTER — TELEPHONE (OUTPATIENT)
Dept: FAMILY MEDICINE CLINIC | Age: 29
End: 2019-03-05

## 2019-04-04 ENCOUNTER — HOSPITAL ENCOUNTER (EMERGENCY)
Age: 29
Discharge: HOME | End: 2019-04-04
Attending: EMERGENCY MEDICINE

## 2019-04-04 VITALS
BODY MASS INDEX: 36.45 KG/M2 | WEIGHT: 315 LBS | HEART RATE: 75 BPM | SYSTOLIC BLOOD PRESSURE: 147 MMHG | DIASTOLIC BLOOD PRESSURE: 82 MMHG | HEIGHT: 78 IN | OXYGEN SATURATION: 100 % | TEMPERATURE: 98.6 F | RESPIRATION RATE: 16 BRPM

## 2019-04-04 DIAGNOSIS — T24.231A PARTIAL THICKNESS BURN OF RIGHT LOWER LEG, INITIAL ENCOUNTER: Primary | ICD-10-CM

## 2019-04-04 PROCEDURE — 90471 IMMUNIZATION ADMIN: CPT

## 2019-04-04 PROCEDURE — 6360000002 HC RX W HCPCS

## 2019-04-04 PROCEDURE — 6370000000 HC RX 637 (ALT 250 FOR IP): Performed by: EMERGENCY MEDICINE

## 2019-04-04 PROCEDURE — 90715 TDAP VACCINE 7 YRS/> IM: CPT

## 2019-04-04 PROCEDURE — 99282 EMERGENCY DEPT VISIT SF MDM: CPT

## 2019-04-04 RX ORDER — CEPHALEXIN 500 MG/1
500 CAPSULE ORAL ONCE
Status: COMPLETED | OUTPATIENT
Start: 2019-04-04 | End: 2019-04-04

## 2019-04-04 RX ORDER — SULFAMETHOXAZOLE AND TRIMETHOPRIM 800; 160 MG/1; MG/1
1 TABLET ORAL ONCE
Status: COMPLETED | OUTPATIENT
Start: 2019-04-04 | End: 2019-04-04

## 2019-04-04 RX ORDER — CEPHALEXIN 500 MG/1
500 CAPSULE ORAL ONCE
Status: DISCONTINUED | OUTPATIENT
Start: 2019-04-04 | End: 2019-04-04 | Stop reason: HOSPADM

## 2019-04-04 RX ORDER — SULFAMETHOXAZOLE AND TRIMETHOPRIM 800; 160 MG/1; MG/1
1 TABLET ORAL 2 TIMES DAILY
Qty: 20 TABLET | Refills: 0 | Status: SHIPPED | OUTPATIENT
Start: 2019-04-04 | End: 2019-04-14

## 2019-04-04 RX ORDER — SULFAMETHOXAZOLE AND TRIMETHOPRIM 200; 40 MG/5ML; MG/5ML
160 SUSPENSION ORAL ONCE
Status: DISCONTINUED | OUTPATIENT
Start: 2019-04-04 | End: 2019-04-04

## 2019-04-04 RX ADMIN — SULFAMETHOXAZOLE AND TRIMETHOPRIM 1 TABLET: 800; 160 TABLET ORAL at 01:36

## 2019-04-04 RX ADMIN — TETANUS TOXOID, REDUCED DIPHTHERIA TOXOID AND ACELLULAR PERTUSSIS VACCINE, ADSORBED 0.5 ML: 5; 2.5; 8; 8; 2.5 SUSPENSION INTRAMUSCULAR at 01:33

## 2019-04-04 RX ADMIN — CEPHALEXIN 1000 MG: 500 CAPSULE ORAL at 01:33

## 2019-04-04 ASSESSMENT — PAIN DESCRIPTION - ORIENTATION: ORIENTATION: RIGHT;LOWER

## 2019-04-04 ASSESSMENT — PAIN DESCRIPTION - DESCRIPTORS: DESCRIPTORS: BURNING

## 2019-04-04 ASSESSMENT — PAIN DESCRIPTION - PAIN TYPE: TYPE: ACUTE PAIN

## 2019-04-04 ASSESSMENT — PAIN SCALES - GENERAL: PAINLEVEL_OUTOF10: 8

## 2019-04-04 ASSESSMENT — PAIN DESCRIPTION - LOCATION: LOCATION: LEG

## 2019-04-04 NOTE — ED PROVIDER NOTES
Triage Chief Complaint:   Wound Infection      [place]:  Andreia Campbell II is a 34 y.o. male that presents with a burn to the right lower extremity. Patient burned his leg yesterday and is concerned about infection. There is some surrounding erythema. His tetanus immunizations are not up-to-date. Patient is not immunocompromised has asthma as his only ongoing medical problem. 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 No pertinent family history.   Social History     Socioeconomic History    Marital status:      Spouse name: Not on file    Number of children: Not on file    Years of education: Not on file    Highest education level: Not on file   Occupational History    Not on file   Social Needs    Financial resource strain: Not on file    Food insecurity:     Worry: Not on file     Inability: Not on file    Transportation needs:     Medical: Not on file     Non-medical: Not on file   Tobacco Use    Smoking status: Never Smoker    Smokeless tobacco: Current User     Types: Chew   Substance and Sexual Activity    Alcohol use: Yes     Comment: occassionally    Drug use: No    Sexual activity: Yes     Partners: Female   Lifestyle    Physical activity:     Days per week: Not on file     Minutes per session: Not on file    Stress: Not on file   Relationships    Social connections:     Talks on phone: Not on file     Gets together: Not on file     Attends Buddhist service: Not on file     Active member of club or organization: Not on file     Attends meetings of clubs or organizations: Not on file     Relationship status: Not on file    Intimate partner violence: Fear of current or ex partner: Not on file     Emotionally abused: Not on file     Physically abused: Not on file     Forced sexual activity: Not on file   Other Topics Concern    Not on file   Social History Narrative    Not on file     Current Facility-Administered Medications   Medication Dose Route Frequency Provider Last Rate Last Dose    cephALEXin (KEFLEX) capsule 500 mg  500 mg Oral Once [place] Roller., MD        sulfamethoxazole-trimethoprim (BACTRIM DS;SEPTRA DS) 800-160 MG per tablet 1 tablet  1 tablet Oral Once Rochester Roller., MD        Tdap (ADACEL) injection 0.5 mL  0.5 mL Intramuscular Once Rochester Roller., MD         Current Outpatient Medications   Medication Sig Dispense Refill    sulfamethoxazole-trimethoprim (BACTRIM DS) 800-160 MG per tablet Take 1 tablet by mouth 2 times daily for 10 days 20 tablet 0    ibuprofen (ADVIL;MOTRIN) 800 MG tablet Take 1 tablet by mouth every 6 hours as needed for Pain 20 tablet 3    Acetaminophen (TYLENOL EXTRA STRENGTH PO) Take 1,000 mg by mouth daily as needed       No Known Allergies  Nursing Notes Reviewed    Physical Exam:  ED Triage Vitals [04/04/19 0120]   Enc Vitals Group      BP (!) 147/82      Pulse 75      Resp 16      Temp 98.6 °F (37 °C)      Temp Source Oral      SpO2 100 %      Weight (!) 379 lb (171.9 kg)      Height 6' 6\" (1.981 m)      Head Circumference       Peak Flow       Pain Score       Pain Loc       Pain Edu? Excl. in [address]? GENERAL APPEARANCE: A well-developed well-nourished  very pleasant 65-year-old male in mild distress  HEAD: Normocephalic, atraumatic      EXTREMITIES: 3 x 3 cm of superficial second-degree burn noted on the medial aspect of his right lower extremity. There is no lymphangitis but there was 2 cm of surrounding cellulitis. SKIN: Warm and dry.  Normal color, no rash or other skin lesion    MENTAL STATUS: Alert, oriented, interactive,     Nursing note and vital signs reviewed     I have reviewed and interpreted all of the currently available lab results from this visit (if applicable):  No results found for this visit on 04/04/19. Radiographs (if obtained):  ? Radiologist's Report Reviewed:  No orders to display       ? Discussed with Radiologist:     ? The following radiograph was interpreted by myself in the absence of a radiologist:     EKG (if obtained): (All EKG's are interpreted by myself in the absence of a cardiologist)      MDM:   Patient with superficial second-degree burn is becoming secondarily infected on the right lower extremity. He'll be treated as an outpatient we'll dress the wound with antibiotic ointment and have updated his tetanus status and will cover him with Bactrim and Keflex. In the interim he is to follow-up with his family physician or return to the emergency department in the next 24-48 hours if no improvement is noted    Final Impression:  1. Partial thickness burn of right lower leg, initial encounter        Critical Care:       Disposition referral (if applicable):  Carlos Thorpe, CRISTELA - CNP  [address] [facility],[address]  [phone]    In 1 day        Disposition medications (if applicable):  New Prescriptions    SULFAMETHOXAZOLE-TRIMETHOPRIM (BACTRIM DS) 800-160 MG PER TABLET    Take 1 tablet by mouth 2 times daily for 10 days       Comment: Please note this report has been produced using speech recognition software and may contain errors related to that system including errors in grammar, punctuation, and spelling, as well as words and phrases that may be inappropriate. If there are any questions or concerns please feel free to contact the dictating provider for clarification.       (Please note that portions of this note may have been completed with a voice recognition program. Efforts were made to edit the dictations but occasionally words are mis-transcribed.)    Hargis Scheuermann, MD Hargis Scheuermann., MD  04/04/19 2413

## 2019-05-16 ENCOUNTER — APPOINTMENT (OUTPATIENT)
Dept: CT IMAGING | Age: 29
End: 2019-05-16

## 2019-05-16 ENCOUNTER — HOSPITAL ENCOUNTER (EMERGENCY)
Age: 29
Discharge: HOME | End: 2019-05-16
Attending: EMERGENCY MEDICINE

## 2019-05-16 VITALS
HEART RATE: 87 BPM | RESPIRATION RATE: 16 BRPM | BODY MASS INDEX: 36.45 KG/M2 | WEIGHT: 315 LBS | HEIGHT: 78 IN | SYSTOLIC BLOOD PRESSURE: 129 MMHG | OXYGEN SATURATION: 100 % | DIASTOLIC BLOOD PRESSURE: 90 MMHG | TEMPERATURE: 99 F

## 2019-05-16 DIAGNOSIS — L03.211 FACIAL CELLULITIS: Primary | ICD-10-CM

## 2019-05-16 DIAGNOSIS — R03.0 ELEVATED BLOOD PRESSURE READING: ICD-10-CM

## 2019-05-16 DIAGNOSIS — L03.213 PRESEPTAL CELLULITIS: ICD-10-CM

## 2019-05-16 DIAGNOSIS — L03.90 RECURRENT CELLULITIS: ICD-10-CM

## 2019-05-16 LAB
A/G RATIO: 1.4 (ref 1.1–2.2)
ALBUMIN SERPL-MCNC: 4.6 G/DL (ref 3.4–5)
ALP BLD-CCNC: 76 U/L (ref 40–129)
ALT SERPL-CCNC: 40 U/L (ref 10–40)
ANION GAP SERPL CALCULATED.3IONS-SCNC: 10 MMOL/L (ref 3–16)
AST SERPL-CCNC: 31 U/L (ref 15–37)
BASOPHILS ABSOLUTE: 0 K/UL (ref 0–0.2)
BASOPHILS RELATIVE PERCENT: 1.2 %
BILIRUB SERPL-MCNC: 0.8 MG/DL (ref 0–1)
BUN BLDV-MCNC: 11 MG/DL (ref 7–20)
CALCIUM SERPL-MCNC: 9.3 MG/DL (ref 8.3–10.6)
CHLORIDE BLD-SCNC: 104 MMOL/L (ref 99–110)
CO2: 26 MMOL/L (ref 21–32)
CREAT SERPL-MCNC: 0.8 MG/DL (ref 0.9–1.3)
EOSINOPHILS ABSOLUTE: 0 K/UL (ref 0–0.6)
EOSINOPHILS RELATIVE PERCENT: 0.6 %
GFR AFRICAN AMERICAN: >60
GFR NON-AFRICAN AMERICAN: >60
GLOBULIN: 3.4 G/DL
GLUCOSE BLD-MCNC: 107 MG/DL (ref 70–99)
HCT VFR BLD CALC: 40.7 % (ref 40.5–52.5)
HEMOGLOBIN: 13.5 G/DL (ref 13.5–17.5)
LACTIC ACID: 0.8 MMOL/L (ref 0.4–2)
LYMPHOCYTES ABSOLUTE: 0.8 K/UL (ref 1–5.1)
LYMPHOCYTES RELATIVE PERCENT: 26.5 %
MCH RBC QN AUTO: 28.4 PG (ref 26–34)
MCHC RBC AUTO-ENTMCNC: 33.1 G/DL (ref 31–36)
MCV RBC AUTO: 85.8 FL (ref 80–100)
MONOCYTES ABSOLUTE: 0.4 K/UL (ref 0–1.3)
MONOCYTES RELATIVE PERCENT: 13.3 %
NEUTROPHILS ABSOLUTE: 1.8 K/UL (ref 1.7–7.7)
NEUTROPHILS RELATIVE PERCENT: 58.4 %
PDW BLD-RTO: 14.6 % (ref 12.4–15.4)
PLATELET # BLD: 210 K/UL (ref 135–450)
PMV BLD AUTO: 8.5 FL (ref 5–10.5)
POTASSIUM REFLEX MAGNESIUM: 4.2 MMOL/L (ref 3.5–5.1)
RBC # BLD: 4.75 M/UL (ref 4.2–5.9)
SODIUM BLD-SCNC: 140 MMOL/L (ref 136–145)
TOTAL PROTEIN: 8 G/DL (ref 6.4–8.2)
WBC # BLD: 3 K/UL (ref 4–11)

## 2019-05-16 PROCEDURE — 80053 COMPREHEN METABOLIC PANEL: CPT

## 2019-05-16 PROCEDURE — 96374 THER/PROPH/DIAG INJ IV PUSH: CPT

## 2019-05-16 PROCEDURE — 70487 CT MAXILLOFACIAL W/DYE: CPT

## 2019-05-16 PROCEDURE — 6360000004 HC RX CONTRAST MEDICATION: Performed by: PHYSICIAN ASSISTANT

## 2019-05-16 PROCEDURE — 87040 BLOOD CULTURE FOR BACTERIA: CPT

## 2019-05-16 PROCEDURE — 96375 TX/PRO/DX INJ NEW DRUG ADDON: CPT

## 2019-05-16 PROCEDURE — 83605 ASSAY OF LACTIC ACID: CPT

## 2019-05-16 PROCEDURE — 6360000002 HC RX W HCPCS: Performed by: PHYSICIAN ASSISTANT

## 2019-05-16 PROCEDURE — 2500000003 HC RX 250 WO HCPCS: Performed by: PHYSICIAN ASSISTANT

## 2019-05-16 PROCEDURE — 99283 EMERGENCY DEPT VISIT LOW MDM: CPT

## 2019-05-16 PROCEDURE — 2580000003 HC RX 258: Performed by: PHYSICIAN ASSISTANT

## 2019-05-16 PROCEDURE — 85025 COMPLETE CBC W/AUTO DIFF WBC: CPT

## 2019-05-16 PROCEDURE — 36415 COLL VENOUS BLD VENIPUNCTURE: CPT

## 2019-05-16 RX ORDER — SULFAMETHOXAZOLE AND TRIMETHOPRIM 400; 80 MG/1; MG/1
1 TABLET ORAL 2 TIMES DAILY
Status: ON HOLD | COMMUNITY
End: 2020-05-16

## 2019-05-16 RX ORDER — KETOROLAC TROMETHAMINE 30 MG/ML
15 INJECTION, SOLUTION INTRAMUSCULAR; INTRAVENOUS ONCE
Status: COMPLETED | OUTPATIENT
Start: 2019-05-16 | End: 2019-05-16

## 2019-05-16 RX ORDER — KETOROLAC TROMETHAMINE 30 MG/ML
30 INJECTION, SOLUTION INTRAMUSCULAR; INTRAVENOUS ONCE
Status: DISCONTINUED | OUTPATIENT
Start: 2019-05-16 | End: 2019-05-16

## 2019-05-16 RX ORDER — ONDANSETRON 2 MG/ML
4 INJECTION INTRAMUSCULAR; INTRAVENOUS ONCE
Status: COMPLETED | OUTPATIENT
Start: 2019-05-16 | End: 2019-05-16

## 2019-05-16 RX ORDER — CLINDAMYCIN HYDROCHLORIDE 150 MG/1
450 CAPSULE ORAL 3 TIMES DAILY
Qty: 90 CAPSULE | Refills: 0 | Status: SHIPPED | OUTPATIENT
Start: 2019-05-16 | End: 2019-05-26

## 2019-05-16 RX ORDER — 0.9 % SODIUM CHLORIDE 0.9 %
1000 INTRAVENOUS SOLUTION INTRAVENOUS ONCE
Status: COMPLETED | OUTPATIENT
Start: 2019-05-16 | End: 2019-05-16

## 2019-05-16 RX ORDER — IBUPROFEN 800 MG/1
800 TABLET ORAL EVERY 8 HOURS PRN
Qty: 20 TABLET | Refills: 0 | Status: SHIPPED | OUTPATIENT
Start: 2019-05-16 | End: 2020-11-06

## 2019-05-16 RX ORDER — DIPHENHYDRAMINE HYDROCHLORIDE 50 MG/ML
25 INJECTION INTRAMUSCULAR; INTRAVENOUS ONCE
Status: COMPLETED | OUTPATIENT
Start: 2019-05-16 | End: 2019-05-16

## 2019-05-16 RX ORDER — CLINDAMYCIN PHOSPHATE 600 MG/50ML
600 INJECTION INTRAVENOUS ONCE
Status: COMPLETED | OUTPATIENT
Start: 2019-05-16 | End: 2019-05-16

## 2019-05-16 RX ADMIN — ONDANSETRON 4 MG: 2 INJECTION INTRAMUSCULAR; INTRAVENOUS at 16:02

## 2019-05-16 RX ADMIN — KETOROLAC TROMETHAMINE 15 MG: 30 INJECTION, SOLUTION INTRAMUSCULAR at 16:02

## 2019-05-16 RX ADMIN — SODIUM CHLORIDE 1000 ML: 9 INJECTION, SOLUTION INTRAVENOUS at 16:02

## 2019-05-16 RX ADMIN — CLINDAMYCIN PHOSPHATE 600 MG: 600 INJECTION, SOLUTION INTRAVENOUS at 16:02

## 2019-05-16 RX ADMIN — DIPHENHYDRAMINE HYDROCHLORIDE 25 MG: 50 INJECTION, SOLUTION INTRAMUSCULAR; INTRAVENOUS at 16:03

## 2019-05-16 RX ADMIN — IOPAMIDOL 75 ML: 755 INJECTION, SOLUTION INTRAVENOUS at 16:58

## 2019-05-16 ASSESSMENT — ENCOUNTER SYMPTOMS
VOMITING: 0
ROS SKIN COMMENTS: ABSCESS
COLOR CHANGE: 1
SHORTNESS OF BREATH: 0
NAUSEA: 0
DIARRHEA: 0

## 2019-05-16 ASSESSMENT — PAIN DESCRIPTION - DESCRIPTORS: DESCRIPTORS: ACHING

## 2019-05-16 ASSESSMENT — PAIN DESCRIPTION - LOCATION: LOCATION: FACE

## 2019-05-16 ASSESSMENT — PAIN SCALES - GENERAL: PAINLEVEL_OUTOF10: 6

## 2019-05-16 ASSESSMENT — PAIN DESCRIPTION - PAIN TYPE: TYPE: ACUTE PAIN

## 2019-05-16 NOTE — ED PROVIDER NOTES
Patient having left facial abscess x 4 days, last night he squeezed it and today woke up with worse pain and swelling around his left eye. States he keeps getting abscesses and cellulitis, hx MRSA and currently on bactrim when this infection started. No fever. No vomiting. No vision changes. The history is provided by the patient. Abscess   Location:  Face  Facial abscess location:  Face  Size:  2-3cm  Abscess quality: fluctuance, induration, painful and redness    Red streaking: no    Duration:  4 days  Progression:  Worsening  Associated symptoms: no fever, no nausea and no vomiting    Risk factors: hx of MRSA and prior abscess        Review of Systems   Constitutional: Negative for chills and fever. Respiratory: Negative for shortness of breath. Cardiovascular: Negative for chest pain. Gastrointestinal: Negative for diarrhea, nausea and vomiting. Skin: Positive for color change (erythema). abscess   All other systems reviewed and are negative. PAST MEDICAL HISTORY   has a past medical history of Asthma, Congenital anomaly of inferior vena cava, and Enlarged heart. PAST SURGICAL HISTORY   has a past surgical history that includes Tympanostomy tube placement. FAMILY HISTORY  family history is not on file. SOCIAL HISTORY   reports that he has never smoked. His smokeless tobacco use includes chew. He reports that he drinks alcohol. He reports that he does not use drugs. HOME MEDICATIONS     Prior to Admission medications    Medication Sig Start Date End Date Taking?  Authorizing Provider   sulfamethoxazole-trimethoprim (BACTRIM;SEPTRA) 400-80 MG per tablet Take 1 tablet by mouth 2 times daily   Yes Historical Provider, MD   Acetaminophen (TYLENOL EXTRA STRENGTH PO) Take 1,000 mg by mouth daily as needed    Historical Provider, MD   ibuprofen (ADVIL;MOTRIN) 800 MG tablet Take 1 tablet by mouth every 6 hours as needed for Pain 6/1/15   Donald Hughes MD        ALLERGIES  is allergic to contrast [iodides]. BP (!) 165/108   Pulse 112   Temp 99 °F (37.2 °C) (Oral)   Resp 16   Ht 6' 6\" (1.981 m)   Wt (!) 382 lb (173.3 kg)   SpO2 100%   BMI 44.14 kg/m²     Physical Exam   Constitutional: He is oriented to person, place, and time. He appears well-developed and well-nourished. Non-toxic appearance. He does not have a sickly appearance. HENT:   Head: Normocephalic and atraumatic. Right Ear: Tympanic membrane and ear canal normal.   Left Ear: Tympanic membrane and ear canal normal.   Mouth/Throat: Uvula is midline, oropharynx is clear and moist and mucous membranes are normal. No oropharyngeal exudate, posterior oropharyngeal edema, posterior oropharyngeal erythema or tonsillar abscesses. Eyes: Pupils are equal, round, and reactive to light. Conjunctivae and EOM are normal.   Neck: Normal range of motion. Neck supple. Cardiovascular: Normal rate, regular rhythm, normal heart sounds and intact distal pulses. Pulmonary/Chest: Effort normal and breath sounds normal. No stridor. No respiratory distress. He has no wheezes. He has no rales. Neurological: He is alert and oriented to person, place, and time. He exhibits normal muscle tone. Skin: Skin is warm and dry. Psychiatric: He has a normal mood and affect. His behavior is normal.   Vitals reviewed.       Procedures    MDM  Number of Diagnoses or Management Options  Elevated blood pressure reading:   Facial cellulitis:   Preseptal cellulitis:   Recurrent cellulitis:      Amount and/or Complexity of Data Reviewed  Clinical lab tests: ordered and reviewed  Review and summarize past medical records: yes  Discuss the patient with other providers: yes    Patient Progress  Patient progress: stable    Results for orders placed or performed during the hospital encounter of 05/16/19   Lactic Acid, Plasma   Result Value Ref Range    Lactic Acid 0.8 0.4 - 2.0 mmol/L   CBC Auto Differential   Result Value Ref Range    WBC 3.0 infection periorbital cellulitis Acuity: Acute Type of Exam: Initial FINDINGS: There is abnormal soft tissue swelling identified involving the left face involving left distal muscle with distal thickening, left pre malar eminence and left periorbital region. This is most pronounced on the left periorbital region. No evidence of postseptal cellulitis. Globes orbital soft tissues are otherwise intact. No retrobulbar loculated fluid collections or abscess. No evidence of facial bone fracture. Mild ethmoid sinus mucosal thickening. Be floor of mouth, base of tongue, supraglottic structures are unremarkable. Visualized intracranial contents appear grossly unremarkable as visualized. Left facial cellulitis and preseptal cellulitis. No loculated fluid collection identified to suggest abscess formation. 3:52 PM  Allergy listed to IV contrast, makes him feel sick to stomach and anxious, not true allergy, will treat with zofran and benadryl prior to test, patient in agreement    6:04 PM  Recheck patient. Stable. Discussed test results with him. CT scan showing left facial cellulitis and preseptal cellulitis, no post-septal infection. No loculated fluid collection. Patient was treated with IV clindamycin here, he will be discharged with same. Advised to follow-up with his family doctor for recheck in 24-48 hours and strict return precautions to the ER for any worsening symptoms worsening pain, any worsening swelling, spreading redness or if he develops any fevers or vomiting. He understands and agrees. He is also given referral to infectious disease with his history of recurrent cellulitis. I estimate there is LOW risk for SEVERE CELLULITIS, SEPSIS OR NECROTIZING FASCIITIS,  thus I consider the discharge disposition reasonable. Dr. Ole Carter MD spent face to face time with patient and agrees with above dx and treatment.        Please note that this chart was generated using Dragon dictation software.  Although every effort was made to ensure the accuracy of this automated transcription, some errors in transcription may have occurred       Samira Fried PA-C  05/16/19 9105

## 2019-05-22 LAB
BLOOD CULTURE, ROUTINE: NORMAL
CULTURE, BLOOD 2: NORMAL

## 2020-05-16 ENCOUNTER — HOSPITAL ENCOUNTER (INPATIENT)
Age: 30
LOS: 1 days | Discharge: HOME | DRG: 392 | End: 2020-05-16
Attending: INTERNAL MEDICINE | Admitting: INTERNAL MEDICINE
Payer: COMMERCIAL

## 2020-05-16 VITALS
DIASTOLIC BLOOD PRESSURE: 60 MMHG | SYSTOLIC BLOOD PRESSURE: 113 MMHG | TEMPERATURE: 97.7 F | OXYGEN SATURATION: 100 % | BODY MASS INDEX: 36.45 KG/M2 | HEIGHT: 78 IN | RESPIRATION RATE: 16 BRPM | WEIGHT: 315 LBS | HEART RATE: 77 BPM

## 2020-05-16 PROBLEM — R07.9 CHEST PAIN: Status: ACTIVE | Noted: 2020-05-16

## 2020-05-16 LAB
CHOLESTEROL, TOTAL: 140 MG/DL (ref 0–199)
EKG ATRIAL RATE: 90 BPM
EKG DIAGNOSIS: NORMAL
EKG P AXIS: 27 DEGREES
EKG P-R INTERVAL: 176 MS
EKG Q-T INTERVAL: 378 MS
EKG QRS DURATION: 104 MS
EKG QTC CALCULATION (BAZETT): 462 MS
EKG R AXIS: -20 DEGREES
EKG T AXIS: 24 DEGREES
EKG VENTRICULAR RATE: 90 BPM
HCT VFR BLD CALC: 30 % (ref 40.5–52.5)
HDLC SERPL-MCNC: 21 MG/DL (ref 40–60)
HEMOGLOBIN: 10.1 G/DL (ref 13.5–17.5)
LACTIC ACID: 0.8 MMOL/L (ref 0.4–2)
LDL CHOLESTEROL CALCULATED: 79 MG/DL
LV EF: 55 %
LVEF MODALITY: NORMAL
TRIGL SERPL-MCNC: 200 MG/DL (ref 0–150)
TROPONIN: <0.01 NG/ML
VLDLC SERPL CALC-MCNC: 40 MG/DL

## 2020-05-16 PROCEDURE — 83036 HEMOGLOBIN GLYCOSYLATED A1C: CPT

## 2020-05-16 PROCEDURE — 86702 HIV-2 ANTIBODY: CPT

## 2020-05-16 PROCEDURE — 1200000000 HC SEMI PRIVATE

## 2020-05-16 PROCEDURE — G0378 HOSPITAL OBSERVATION PER HR: HCPCS

## 2020-05-16 PROCEDURE — 99254 IP/OBS CNSLTJ NEW/EST MOD 60: CPT | Performed by: INTERNAL MEDICINE

## 2020-05-16 PROCEDURE — 86701 HIV-1ANTIBODY: CPT

## 2020-05-16 PROCEDURE — 87390 HIV-1 AG IA: CPT

## 2020-05-16 PROCEDURE — 83605 ASSAY OF LACTIC ACID: CPT

## 2020-05-16 PROCEDURE — 93010 ELECTROCARDIOGRAM REPORT: CPT | Performed by: INTERNAL MEDICINE

## 2020-05-16 PROCEDURE — 2580000003 HC RX 258: Performed by: INTERNAL MEDICINE

## 2020-05-16 PROCEDURE — 36415 COLL VENOUS BLD VENIPUNCTURE: CPT

## 2020-05-16 PROCEDURE — G0379 DIRECT REFER HOSPITAL OBSERV: HCPCS

## 2020-05-16 PROCEDURE — 93306 TTE W/DOPPLER COMPLETE: CPT

## 2020-05-16 PROCEDURE — 93005 ELECTROCARDIOGRAM TRACING: CPT | Performed by: INTERNAL MEDICINE

## 2020-05-16 PROCEDURE — 85018 HEMOGLOBIN: CPT

## 2020-05-16 PROCEDURE — 85014 HEMATOCRIT: CPT

## 2020-05-16 PROCEDURE — 80061 LIPID PANEL: CPT

## 2020-05-16 PROCEDURE — 84484 ASSAY OF TROPONIN QUANT: CPT

## 2020-05-16 RX ORDER — SODIUM CHLORIDE 0.9 % (FLUSH) 0.9 %
10 SYRINGE (ML) INJECTION EVERY 12 HOURS SCHEDULED
Status: DISCONTINUED | OUTPATIENT
Start: 2020-05-16 | End: 2020-05-16 | Stop reason: HOSPADM

## 2020-05-16 RX ORDER — AMLODIPINE BESYLATE 5 MG/1
5 TABLET ORAL DAILY
Status: DISCONTINUED | OUTPATIENT
Start: 2020-05-16 | End: 2020-05-16 | Stop reason: HOSPADM

## 2020-05-16 RX ORDER — ASPIRIN 81 MG/1
81 TABLET, CHEWABLE ORAL DAILY
Status: DISCONTINUED | OUTPATIENT
Start: 2020-05-16 | End: 2020-05-16 | Stop reason: HOSPADM

## 2020-05-16 RX ORDER — AMLODIPINE BESYLATE 5 MG/1
5 TABLET ORAL DAILY
Qty: 30 TABLET | Refills: 3 | Status: SHIPPED | OUTPATIENT
Start: 2020-05-16 | End: 2020-08-12 | Stop reason: ALTCHOICE

## 2020-05-16 RX ORDER — 0.9 % SODIUM CHLORIDE 0.9 %
500 INTRAVENOUS SOLUTION INTRAVENOUS ONCE
Status: COMPLETED | OUTPATIENT
Start: 2020-05-16 | End: 2020-05-16

## 2020-05-16 RX ORDER — PROCHLORPERAZINE EDISYLATE 5 MG/ML
10 INJECTION INTRAMUSCULAR; INTRAVENOUS EVERY 6 HOURS PRN
Status: DISCONTINUED | OUTPATIENT
Start: 2020-05-16 | End: 2020-05-16 | Stop reason: HOSPADM

## 2020-05-16 RX ORDER — HYDRALAZINE HYDROCHLORIDE 20 MG/ML
5 INJECTION INTRAMUSCULAR; INTRAVENOUS EVERY 4 HOURS PRN
Status: DISCONTINUED | OUTPATIENT
Start: 2020-05-16 | End: 2020-05-16 | Stop reason: HOSPADM

## 2020-05-16 RX ORDER — SODIUM CHLORIDE 0.9 % (FLUSH) 0.9 %
10 SYRINGE (ML) INJECTION PRN
Status: DISCONTINUED | OUTPATIENT
Start: 2020-05-16 | End: 2020-05-16 | Stop reason: HOSPADM

## 2020-05-16 RX ORDER — CLINDAMYCIN HYDROCHLORIDE 150 MG/1
300 CAPSULE ORAL 3 TIMES DAILY
Status: DISCONTINUED | OUTPATIENT
Start: 2020-05-16 | End: 2020-05-16 | Stop reason: HOSPADM

## 2020-05-16 RX ORDER — ASPIRIN 81 MG/1
81 TABLET, CHEWABLE ORAL DAILY
Qty: 30 TABLET | Refills: 3 | Status: SHIPPED | OUTPATIENT
Start: 2020-05-16 | End: 2020-08-12 | Stop reason: ALTCHOICE

## 2020-05-16 RX ORDER — POLYETHYLENE GLYCOL 3350 17 G/17G
17 POWDER, FOR SOLUTION ORAL DAILY PRN
Status: DISCONTINUED | OUTPATIENT
Start: 2020-05-16 | End: 2020-05-16 | Stop reason: HOSPADM

## 2020-05-16 RX ORDER — ACETAMINOPHEN 650 MG/1
650 SUPPOSITORY RECTAL EVERY 6 HOURS PRN
Status: DISCONTINUED | OUTPATIENT
Start: 2020-05-16 | End: 2020-05-16 | Stop reason: HOSPADM

## 2020-05-16 RX ORDER — ACETAMINOPHEN 325 MG/1
650 TABLET ORAL EVERY 6 HOURS PRN
Status: DISCONTINUED | OUTPATIENT
Start: 2020-05-16 | End: 2020-05-16 | Stop reason: HOSPADM

## 2020-05-16 RX ADMIN — SODIUM CHLORIDE 500 ML: 9 INJECTION, SOLUTION INTRAVENOUS at 10:23

## 2020-05-16 NOTE — CONSULTS
[address] [facility]  [phone]      Attending Physician: India Tejada MD  Reason for Consultation/Chief Complaint: CP    Subjective   History of Present Illness:  Jaz Apple II is a 34 y.o. patient who presented to the hospital with complaints of Cp, SOB, and dyspnea. States occurred at 1pm and walking around [facility], noted a lot of pressure in middle and moved to back. + SOB hard to catch breath. Lasted 1 hr then got ASA and went away. + Dizziness. States was dizziness and went  Down to knee to steady self. Dry heaving in restroom. States prior to that no CP with exertion. States under a lot of stress. States normally BP a little elevated at 140's (146/90)    [address] vitals, 136/75, p91 r 16, 100% on RA and T 98.2    Past Medical History:   has a past medical history of Asthma, Congenital anomaly of inferior vena cava, and Enlarged heart. Surgical History:   has a past surgical history that includes Tympanostomy tube placement. Social History:   reports that he has never smoked. His smokeless tobacco use includes chew. He reports current alcohol use. He reports that he does not use drugs. Family History:  family history is not on file. Father,mother, GP on both side. ? Age. Home Medications:  Were reviewed and are listed in nursing record and/or below  Prior to Admission medications    Medication Sig Start Date End Date Taking?  Authorizing Provider   PROAIR  (45 Base) MCG/ACT inhaler Take 108 mcg by mouth as needed 2/9/20   Historical Provider, MD   ibuprofen (IBU) 800 MG tablet Take 1 tablet by mouth every 8 hours as needed for Pain 5/16/19   Isabel Alston PA-C   Acetaminophen (TYLENOL EXTRA STRENGTH PO) Take 1,000 mg by mouth daily as needed    Historical Provider, MD        CURRENT Medications:  perflutren lipid microspheres (DEFINITY) injection 1.65 mg, ONCE PRN  aspirin chewable tablet 81 mg, Daily  hydrALAZINE (APRESOLINE) injection 5 mg, Q4H PRN  0.9 % 2019    RDW 14.6 2019     2019     CMP:  Lab Results   Component Value Date     2019    K 4.2 2019     2019    CO2 26 2019    BUN 11 2019    CREATININE 0.8 2019    GFRAA >60 2019    GFRAA >60 07/15/2012    AGRATIO 1.4 2019    LABGLOM >60 2019    GLUCOSE 107 2019    PROT 8.0 2019    PROT 7.9 07/15/2012    CALCIUM 9.3 2019    BILITOT 0.8 2019    ALKPHOS 76 2019    AST 31 2019    ALT 40 2019     PT/INR:  No results found for: PTINR  HgBA1c:No results found for: LABA1C  Lab Results   Component Value Date    TROPONINI <0.006 07/15/2012         Cardiac Data     Last EK2020 NSR with PVC, no ischemia  2020 [address] sinus tach, PVC no iscehmia      Echo:    Stress Test:    Cath:    Studies:   CT face:  Left facial cellulitis and preseptal cellulitis.  No loculated fluid   collection identified to suggest abscess formation. CT A/P  Splenomegaly, stable since 2018.  Otherwise, no acute abnormality   or traumatic injury in the abdomen or pelvis.       No IV contrast evident on the images.  The IV contrast likely infiltrated. Assessment and Plan      1. Chest pain  2. Elevated trop: negative x1 here  3. Congential anomaly of IVC: states has \"2 IVCs\"  4. Indigestion  5. Facial cellulitis  No presyncope, pt dizziny and went to ground in case. PLAN  1. No ischemia on ECG and troponin here is negative. Note elevated CK and CKMB from [address],   2. Trop 0.099 and per lab >0.6 c/w AMI   ? If due to HTN  3. Will get echo, if negative ok for d/c home   - set up outpt gxt  4. Asked pt to check BP BID and write in log      IV dye allergy    Patient Active Problem List   Diagnosis    Chest pain           Thank you for allowing us to participate in the care of Mahin Jacobson KARISSA. Please call me with any questions [phone].     Misbah Cuellar MD, [facility] [place]

## 2020-05-16 NOTE — DISCHARGE SUMMARY
He was very hypertensive and a slightly tachycardic. They brought him to [facility] ED, where his troponin was slightly elevated and his EKG had nonspecific abnormalities. Cardiology was contacted and recommended transfer to [facility]. The patient was feeling much better upon arrival here, but still had 1/10 chest discomfort.        Chest pain  - aspirin  - perhaps troponin was related to demand ischemia. - patient was given 1 dose of enoxaparin at [facility]. Not continued. - I do not suspect PE or acute aortic pathology. D-dimer negative. - could be due to GI pathology such as PUD, GERD, and/or esophageal spasm. Consider repeat abdominal CT if symptoms persist as outpatient. The first CT at [facility] was without contrast but looked fine. Consider outpatient GI consultation for EGD if cardiac issues are ruled out. Lipase negative. - cardiology determined that he is ready to discharge and f/u as outpatient for consideration of stress testing.       Near syncope  - due to above issue. No events on tele. TTE unremarkable per cardioloy.     Idiopathic anemia, lymphocytopenia, neutropenia, splenomegaly   - recommend outpatient f/u with hematology.  - HIV was not collected during brief stay here. Negative with PCP a couple years ago. - fatty liver disease on CT, but clinically there is no advanced cirrhosis. He reports only occasional alcohol use.       HTN  - started amlodipine     Resolving L flank cellulitis  - finish up 3 more days of PO clinda     Morbid obesity  - diet and exercise. Consider bariatric surgery. Physical Exam Performed:     /66   Pulse 97   Temp 98.7 °F (37.1 °C) (Oral)   Resp 18   Ht 6' 6\" (1.981 m)   SpO2 99%   BMI 44.14 kg/m²       General appearance:  No apparent distress, appears stated age and cooperative. HEENT:  Normal cephalic, atraumatic without obvious deformity. Pupils equal, round, and reactive to light. Extra ocular muscles intact.  Conjunctivae/corneas

## 2020-05-16 NOTE — LETTER
[place] [id number]  Phone: [phone]         May 16, 2020     Patient: Sarah Edmonds   YOB: 1990   Date of Visit: 5/16/2020       To Whom It May Concern:    Flor Horvath was seen and treated in our emergency department on 5/16/2020. The following work duties are recommended. He may return to work on 5/18/2020    Treatment and work recommendations given by the emergency department are initial emergency measures only, and follow up should be arranged as soon as possible with the company's occupational health provider* or the specialist to whom the worker was referred.     *If the company does not have an occupational health provider, follow up should be at [facility], [address] [place] [address]  [phone]                Sincerely,        Evette Luna RN        Signature:__________________________________

## 2020-05-16 NOTE — PROGRESS NOTES
sent to Dr. Javy Hernández \"Direct admit from [facility] needing orders. please advise. pt cp rated 1/10 at the moment. found down at work w/ complaints of chest pain, dizziness, and nausea. unsure if LOC. Thanks! \"

## 2020-05-16 NOTE — H&P
but still had 1/10 chest discomfort. 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 Authorizing Provider   sulfamethoxazole-trimethoprim (BACTRIM;SEPTRA) 400-80 MG per tablet Take 1 tablet by mouth 2 times daily    Historical Provider, MD   ibuprofen (IBU) 800 MG tablet Take 1 tablet by mouth every 8 hours as needed for Pain 5/16/19   Treasure You PA-C   Acetaminophen (TYLENOL EXTRA STRENGTH PO) Take 1,000 mg by mouth daily as needed    Historical Provider, MD       Allergies:  Contrast [iodides]    Social History:      The patient currently lives at home    TOBACCO:   reports that he has never smoked. His smokeless tobacco use includes chew. ETOH:   reports current alcohol use. E-Cigarettes Vaping or Juuling     Questions Responses    Vaping Use Never User    Start Date     Does device contain nicotine? Quit Date     Vaping Type             Family History:      Reviewed in detail and negative for ESRD. Positive as follows:    No family history on file. REVIEW OF SYSTEMS:   Pertinent positives as noted in the HPI. All other systems reviewed and negative. PHYSICAL EXAM PERFORMED:    /66   Pulse 97   Temp 98.7 °F (37.1 °C) (Oral)   Resp 18   Ht 6' 6\" (1.981 m)   SpO2 99%   BMI 44.14 kg/m²       General appearance:  No apparent distress, appears stated age and cooperative. HEENT:  Normal cephalic, atraumatic without obvious deformity. Pupils equal, round, and reactive to light. Extra ocular muscles intact. Conjunctivae/corneas clear. Neck: Supple, with full range of motion. No jugular venous distention. Trachea midline.   Respiratory:  Normal

## 2020-05-17 LAB
ESTIMATED AVERAGE GLUCOSE: 71 MG/DL
HBA1C MFR BLD: 4.1 %
HIV AG/AB: NORMAL
HIV ANTIGEN: NORMAL
HIV-1 ANTIBODY: NORMAL
HIV-2 AB: NORMAL

## 2020-05-18 ENCOUNTER — CARE COORDINATION (OUTPATIENT)
Dept: OTHER | Facility: CLINIC | Age: 30
End: 2020-05-18

## 2020-05-18 NOTE — CARE COORDINATION
[facility] Transitions Initial Follow Up Call    Call within 2 business days of discharge: Yes    Patient: Shermon Sandifer II Patient : 1990   MRN: [id number]  Reason for Admission: Chest Pain  Discharge Date: 20 RARS: Readmission Risk Score: 6      Last Discharge [facility]       Complaint Diagnosis Description Type Department Provider    20   Admission (Discharged) Benny Wilson MD           Spoke with: Arcenio De La Cruz RN      ACM attempted to reach patient for ED follow up and COVID Risk Education. No answer, VM full. Patient's ED visit was documented as self pay, last scanned ins card was [facility] as child. I suspect patient is no longer covered due to age. ACM will continue outreach at this time and hand off to ambulatory as needed.

## 2020-05-21 ENCOUNTER — CARE COORDINATION (OUTPATIENT)
Dept: OTHER | Facility: CLINIC | Age: 30
End: 2020-05-21

## 2020-08-03 ENCOUNTER — TELEPHONE (OUTPATIENT)
Dept: FAMILY MEDICINE CLINIC | Age: 30
End: 2020-08-03

## 2020-08-04 NOTE — TELEPHONE ENCOUNTER
Patient had been at the emergency room with leg swelling. Venous Doppler reportedly was performed. Patient desired results.   I could find no results in the chart on August 3

## 2020-08-06 ENCOUNTER — TELEPHONE (OUTPATIENT)
Dept: FAMILY MEDICINE CLINIC | Age: 30
End: 2020-08-06

## 2020-08-06 NOTE — TELEPHONE ENCOUNTER
Pt called stating that he was seen at [facility] and an ultra sound was done for possible blood clot. Pt doesn't think it's that and it's cellulitis. Pt was put on Augmentin but it doesn't seem to be helping. States the swelling has gone down into his right foot now and up the back of his calf, reddish color, and runs a fever if not taking Advil every 5-6 hours. Pt uses [employer]. Pt also stated that his foot is so swollen that he can't get his boot on. He's supposed to work this weekend and wanting to know if he could get a work note for Friday, Saturday, and Sunday. Call back pt [phone], pt doesn't have very good service at his house and states it's okay to leave a voicemail.

## 2020-08-07 NOTE — TELEPHONE ENCOUNTER
Spoke with pt mother and appt was cancelled due to Dr being sick. Pt mother states his leg is still swollen and painful up to back of his knee and is still having fever. Recommended pt go to ER again to seek medical attention and have leg evaluated again. Pt mother voiced understanding and agreed to go to ER.

## 2020-08-12 ENCOUNTER — APPOINTMENT (OUTPATIENT)
Dept: NUCLEAR MEDICINE | Age: 30
DRG: 800 | End: 2020-08-12
Payer: COMMERCIAL

## 2020-08-12 ENCOUNTER — HOSPITAL ENCOUNTER (INPATIENT)
Age: 30
LOS: 6 days | Discharge: HOME | DRG: 800 | End: 2020-08-18
Attending: EMERGENCY MEDICINE | Admitting: INTERNAL MEDICINE
Payer: COMMERCIAL

## 2020-08-12 ENCOUNTER — APPOINTMENT (OUTPATIENT)
Dept: CT IMAGING | Age: 30
DRG: 800 | End: 2020-08-12
Payer: COMMERCIAL

## 2020-08-12 PROBLEM — R16.1 SPLENOMEGALY: Status: ACTIVE | Noted: 2020-08-12

## 2020-08-12 PROBLEM — D64.9 ANEMIA: Status: ACTIVE | Noted: 2020-08-12

## 2020-08-12 PROBLEM — R10.12 LUQ ABDOMINAL PAIN: Status: ACTIVE | Noted: 2020-08-12

## 2020-08-12 PROBLEM — D70.8 OTHER NEUTROPENIA (HCC): Status: ACTIVE | Noted: 2020-08-12

## 2020-08-12 LAB
A/G RATIO: 1.2 (ref 1.1–2.2)
ABO/RH: NORMAL
ABO/RH: NORMAL
ALBUMIN SERPL-MCNC: 4.1 G/DL (ref 3.4–5)
ALP BLD-CCNC: 66 U/L (ref 40–129)
ALT SERPL-CCNC: 43 U/L (ref 10–40)
ANION GAP SERPL CALCULATED.3IONS-SCNC: 12 MMOL/L (ref 3–16)
ANTIBODY SCREEN: NORMAL
APTT: 29.5 SEC (ref 24.2–36.2)
AST SERPL-CCNC: 48 U/L (ref 15–37)
BASOPHILS ABSOLUTE: 0 K/UL (ref 0–0.2)
BASOPHILS RELATIVE PERCENT: 1.1 %
BILIRUB SERPL-MCNC: 0.9 MG/DL (ref 0–1)
BILIRUBIN URINE: NEGATIVE
BLOOD, URINE: NEGATIVE
BUN BLDV-MCNC: 18 MG/DL (ref 7–20)
CALCIUM SERPL-MCNC: 8.9 MG/DL (ref 8.3–10.6)
CHLORIDE BLD-SCNC: 99 MMOL/L (ref 99–110)
CLARITY: CLEAR
CO2: 23 MMOL/L (ref 21–32)
COLOR: YELLOW
CREAT SERPL-MCNC: 0.7 MG/DL (ref 0.9–1.3)
DAT IGG: NORMAL
DAT POLYSPECIFIC: NORMAL
EOSINOPHILS ABSOLUTE: 0 K/UL (ref 0–0.6)
EOSINOPHILS RELATIVE PERCENT: 1.1 %
FERRITIN: 520.1 NG/ML (ref 30–400)
FOLATE: 6.22 NG/ML (ref 4.78–24.2)
GFR AFRICAN AMERICAN: >60
GFR NON-AFRICAN AMERICAN: >60
GLOBULIN: 3.4 G/DL
GLUCOSE BLD-MCNC: 120 MG/DL (ref 70–99)
GLUCOSE URINE: NEGATIVE MG/DL
HAPTOGLOBIN: 42 MG/DL (ref 30–200)
HCT VFR BLD CALC: 24 % (ref 40.5–52.5)
HCT VFR BLD CALC: 24.9 % (ref 40.5–52.5)
HCT VFR BLD CALC: 25.5 % (ref 40.5–52.5)
HCT VFR BLD CALC: 25.6 % (ref 40.5–52.5)
HCT VFR BLD CALC: 25.8 % (ref 40.5–52.5)
HEMATOLOGY PATH CONSULT: NORMAL
HEMATOLOGY PATH CONSULT: YES
HEMOGLOBIN: 7.9 G/DL (ref 13.5–17.5)
HEMOGLOBIN: 8.1 G/DL (ref 13.5–17.5)
HEMOGLOBIN: 8.4 G/DL (ref 13.5–17.5)
HEMOGLOBIN: 8.5 G/DL (ref 13.5–17.5)
IMMATURE RETIC FRACT: 0.67 (ref 0.21–0.37)
INR BLD: 1.08 (ref 0.86–1.14)
IRON SATURATION: 12 % (ref 20–50)
IRON: 35 UG/DL (ref 59–158)
KETONES, URINE: NEGATIVE MG/DL
LACTATE DEHYDROGENASE: 320 U/L (ref 100–190)
LEUKOCYTE ESTERASE, URINE: NEGATIVE
LIPASE: 16 U/L (ref 13–60)
LYMPHOCYTES ABSOLUTE: 0.7 K/UL (ref 1–5.1)
LYMPHOCYTES RELATIVE PERCENT: 45.6 %
MCH RBC QN AUTO: 28.8 PG (ref 26–34)
MCH RBC QN AUTO: 29.3 PG (ref 26–34)
MCHC RBC AUTO-ENTMCNC: 32.8 G/DL (ref 31–36)
MCHC RBC AUTO-ENTMCNC: 32.8 G/DL (ref 31–36)
MCV RBC AUTO: 87.7 FL (ref 80–100)
MCV RBC AUTO: 89.3 FL (ref 80–100)
MICROSCOPIC EXAMINATION: NORMAL
MONOCYTES ABSOLUTE: 0.2 K/UL (ref 0–1.3)
MONOCYTES RELATIVE PERCENT: 15.7 %
NEUTROPHILS ABSOLUTE: 0.6 K/UL (ref 1.7–7.7)
NEUTROPHILS RELATIVE PERCENT: 36.5 %
NITRITE, URINE: NEGATIVE
PDW BLD-RTO: 16.1 % (ref 12.4–15.4)
PDW BLD-RTO: 16.4 % (ref 12.4–15.4)
PH UA: 6 (ref 5–8)
PLATELET # BLD: 188 K/UL (ref 135–450)
PLATELET # BLD: 194 K/UL (ref 135–450)
PLATELET SLIDE REVIEW: ADEQUATE
PMV BLD AUTO: 7.7 FL (ref 5–10.5)
PMV BLD AUTO: 8 FL (ref 5–10.5)
POTASSIUM REFLEX MAGNESIUM: 4.1 MMOL/L (ref 3.5–5.1)
PROTEIN UA: NEGATIVE MG/DL
PROTHROMBIN TIME: 12.5 SEC (ref 10–13.2)
RBC # BLD: 2.73 M/UL (ref 4.2–5.9)
RBC # BLD: 2.89 M/UL (ref 4.2–5.9)
RETICULOCYTE ABSOLUTE COUNT: 0.16 M/UL
RETICULOCYTE COUNT PCT: 5.54 % (ref 0.5–2.18)
SLIDE REVIEW: ABNORMAL
SODIUM BLD-SCNC: 134 MMOL/L (ref 136–145)
SPECIFIC GRAVITY UA: 1.02 (ref 1–1.03)
TOTAL IRON BINDING CAPACITY: 303 UG/DL (ref 260–445)
TOTAL PROTEIN: 7.5 G/DL (ref 6.4–8.2)
URINE REFLEX TO CULTURE: NORMAL
URINE TYPE: NORMAL
UROBILINOGEN, URINE: 0.2 E.U./DL
VITAMIN B-12: 625 PG/ML (ref 211–911)
WBC # BLD: 1.6 K/UL (ref 4–11)
WBC # BLD: 1.7 K/UL (ref 4–11)

## 2020-08-12 PROCEDURE — 84238 ASSAY NONENDOCRINE RECEPTOR: CPT

## 2020-08-12 PROCEDURE — 86901 BLOOD TYPING SEROLOGIC RH(D): CPT

## 2020-08-12 PROCEDURE — 6360000002 HC RX W HCPCS

## 2020-08-12 PROCEDURE — 99254 IP/OBS CNSLTJ NEW/EST MOD 60: CPT | Performed by: SURGERY

## 2020-08-12 PROCEDURE — 82728 ASSAY OF FERRITIN: CPT

## 2020-08-12 PROCEDURE — 85014 HEMATOCRIT: CPT

## 2020-08-12 PROCEDURE — 74176 CT ABD & PELVIS W/O CONTRAST: CPT

## 2020-08-12 PROCEDURE — 30233N1 TRANSFUSION OF NONAUTOLOGOUS RED BLOOD CELLS INTO PERIPHERAL VEIN, PERCUTANEOUS APPROACH: ICD-10-PCS | Performed by: SURGERY

## 2020-08-12 PROCEDURE — 96374 THER/PROPH/DIAG INJ IV PUSH: CPT

## 2020-08-12 PROCEDURE — 85045 AUTOMATED RETICULOCYTE COUNT: CPT

## 2020-08-12 PROCEDURE — 6360000002 HC RX W HCPCS: Performed by: STUDENT IN AN ORGANIZED HEALTH CARE EDUCATION/TRAINING PROGRAM

## 2020-08-12 PROCEDURE — 85018 HEMOGLOBIN: CPT

## 2020-08-12 PROCEDURE — 99222 1ST HOSP IP/OBS MODERATE 55: CPT | Performed by: PHYSICIAN ASSISTANT

## 2020-08-12 PROCEDURE — 36415 COLL VENOUS BLD VENIPUNCTURE: CPT

## 2020-08-12 PROCEDURE — 81003 URINALYSIS AUTO W/O SCOPE: CPT

## 2020-08-12 PROCEDURE — 86850 RBC ANTIBODY SCREEN: CPT

## 2020-08-12 PROCEDURE — 2500000003 HC RX 250 WO HCPCS: Performed by: EMERGENCY MEDICINE

## 2020-08-12 PROCEDURE — 78215 LVR&SPLEEN IMG STATIC ONLY: CPT

## 2020-08-12 PROCEDURE — 6360000002 HC RX W HCPCS: Performed by: PHYSICIAN ASSISTANT

## 2020-08-12 PROCEDURE — 99285 EMERGENCY DEPT VISIT HI MDM: CPT

## 2020-08-12 PROCEDURE — 1200000000 HC SEMI PRIVATE

## 2020-08-12 PROCEDURE — 85730 THROMBOPLASTIN TIME PARTIAL: CPT

## 2020-08-12 PROCEDURE — 86900 BLOOD TYPING SEROLOGIC ABO: CPT

## 2020-08-12 PROCEDURE — 83615 LACTATE (LD) (LDH) ENZYME: CPT

## 2020-08-12 PROCEDURE — 86880 COOMBS TEST DIRECT: CPT

## 2020-08-12 PROCEDURE — 2580000003 HC RX 258: Performed by: PHYSICIAN ASSISTANT

## 2020-08-12 PROCEDURE — A9541 TC99M SULFUR COLLOID: HCPCS | Performed by: INTERNAL MEDICINE

## 2020-08-12 PROCEDURE — 83550 IRON BINDING TEST: CPT

## 2020-08-12 PROCEDURE — 83690 ASSAY OF LIPASE: CPT

## 2020-08-12 PROCEDURE — 3430000000 HC RX DIAGNOSTIC RADIOPHARMACEUTICAL: Performed by: INTERNAL MEDICINE

## 2020-08-12 PROCEDURE — 6370000000 HC RX 637 (ALT 250 FOR IP): Performed by: PHYSICIAN ASSISTANT

## 2020-08-12 PROCEDURE — 6370000000 HC RX 637 (ALT 250 FOR IP): Performed by: EMERGENCY MEDICINE

## 2020-08-12 PROCEDURE — 2580000003 HC RX 258: Performed by: EMERGENCY MEDICINE

## 2020-08-12 PROCEDURE — 86920 COMPATIBILITY TEST SPIN: CPT

## 2020-08-12 PROCEDURE — 85025 COMPLETE CBC W/AUTO DIFF WBC: CPT

## 2020-08-12 PROCEDURE — 80053 COMPREHEN METABOLIC PANEL: CPT

## 2020-08-12 PROCEDURE — 6360000002 HC RX W HCPCS: Performed by: EMERGENCY MEDICINE

## 2020-08-12 PROCEDURE — 83010 ASSAY OF HAPTOGLOBIN QUANT: CPT

## 2020-08-12 PROCEDURE — 82746 ASSAY OF FOLIC ACID SERUM: CPT

## 2020-08-12 PROCEDURE — 86922 COMPATIBILITY TEST ANTIGLOB: CPT

## 2020-08-12 PROCEDURE — 85610 PROTHROMBIN TIME: CPT

## 2020-08-12 PROCEDURE — 85027 COMPLETE CBC AUTOMATED: CPT

## 2020-08-12 PROCEDURE — 83540 ASSAY OF IRON: CPT

## 2020-08-12 PROCEDURE — 82607 VITAMIN B-12: CPT

## 2020-08-12 RX ORDER — SODIUM CHLORIDE 0.9 % (FLUSH) 0.9 %
10 SYRINGE (ML) INJECTION EVERY 12 HOURS SCHEDULED
Status: DISCONTINUED | OUTPATIENT
Start: 2020-08-12 | End: 2020-08-18 | Stop reason: HOSPADM

## 2020-08-12 RX ORDER — ONDANSETRON 2 MG/ML
INJECTION INTRAMUSCULAR; INTRAVENOUS
Status: COMPLETED
Start: 2020-08-12 | End: 2020-08-12

## 2020-08-12 RX ORDER — MORPHINE SULFATE 2 MG/ML
2 INJECTION, SOLUTION INTRAMUSCULAR; INTRAVENOUS
Status: DISCONTINUED | OUTPATIENT
Start: 2020-08-12 | End: 2020-08-14

## 2020-08-12 RX ORDER — PROMETHAZINE HYDROCHLORIDE 25 MG/1
12.5 TABLET ORAL EVERY 6 HOURS PRN
Status: DISCONTINUED | OUTPATIENT
Start: 2020-08-12 | End: 2020-08-18 | Stop reason: HOSPADM

## 2020-08-12 RX ORDER — ACETAMINOPHEN 650 MG/1
650 SUPPOSITORY RECTAL EVERY 6 HOURS PRN
Status: DISCONTINUED | OUTPATIENT
Start: 2020-08-12 | End: 2020-08-18 | Stop reason: HOSPADM

## 2020-08-12 RX ORDER — MORPHINE SULFATE 4 MG/ML
4 INJECTION, SOLUTION INTRAMUSCULAR; INTRAVENOUS ONCE
Status: COMPLETED | OUTPATIENT
Start: 2020-08-12 | End: 2020-08-12

## 2020-08-12 RX ORDER — CLINDAMYCIN HYDROCHLORIDE 150 MG/1
300 CAPSULE ORAL ONCE
Status: COMPLETED | OUTPATIENT
Start: 2020-08-12 | End: 2020-08-12

## 2020-08-12 RX ORDER — ACETAMINOPHEN 325 MG/1
650 TABLET ORAL EVERY 6 HOURS PRN
Status: DISCONTINUED | OUTPATIENT
Start: 2020-08-12 | End: 2020-08-18 | Stop reason: HOSPADM

## 2020-08-12 RX ORDER — POLYETHYLENE GLYCOL 3350 17 G/17G
17 POWDER, FOR SOLUTION ORAL DAILY PRN
Status: DISCONTINUED | OUTPATIENT
Start: 2020-08-12 | End: 2020-08-18 | Stop reason: HOSPADM

## 2020-08-12 RX ORDER — CLINDAMYCIN HYDROCHLORIDE 150 MG/1
300 CAPSULE ORAL 4 TIMES DAILY
Status: DISCONTINUED | OUTPATIENT
Start: 2020-08-12 | End: 2020-08-14

## 2020-08-12 RX ORDER — CLINDAMYCIN HYDROCHLORIDE 300 MG/1
300 CAPSULE ORAL 4 TIMES DAILY
Status: ON HOLD | COMMUNITY
End: 2020-08-17 | Stop reason: HOSPADM

## 2020-08-12 RX ORDER — MORPHINE SULFATE 4 MG/ML
4 INJECTION, SOLUTION INTRAMUSCULAR; INTRAVENOUS
Status: DISCONTINUED | OUTPATIENT
Start: 2020-08-12 | End: 2020-08-14

## 2020-08-12 RX ORDER — ONDANSETRON 2 MG/ML
4 INJECTION INTRAMUSCULAR; INTRAVENOUS EVERY 6 HOURS PRN
Status: DISCONTINUED | OUTPATIENT
Start: 2020-08-12 | End: 2020-08-18 | Stop reason: HOSPADM

## 2020-08-12 RX ORDER — 0.9 % SODIUM CHLORIDE 0.9 %
500 INTRAVENOUS SOLUTION INTRAVENOUS ONCE
Status: COMPLETED | OUTPATIENT
Start: 2020-08-12 | End: 2020-08-12

## 2020-08-12 RX ORDER — 0.9 % SODIUM CHLORIDE 0.9 %
20 INTRAVENOUS SOLUTION INTRAVENOUS ONCE
Status: DISCONTINUED | OUTPATIENT
Start: 2020-08-12 | End: 2020-08-12

## 2020-08-12 RX ORDER — SODIUM CHLORIDE 0.9 % (FLUSH) 0.9 %
10 SYRINGE (ML) INJECTION PRN
Status: DISCONTINUED | OUTPATIENT
Start: 2020-08-12 | End: 2020-08-18 | Stop reason: HOSPADM

## 2020-08-12 RX ADMIN — MORPHINE SULFATE 2 MG: 2 INJECTION, SOLUTION INTRAMUSCULAR; INTRAVENOUS at 15:11

## 2020-08-12 RX ADMIN — ONDANSETRON HYDROCHLORIDE 4 MG: 2 INJECTION, SOLUTION INTRAMUSCULAR; INTRAVENOUS at 08:25

## 2020-08-12 RX ADMIN — CLINDAMYCIN HYDROCHLORIDE 300 MG: 150 CAPSULE ORAL at 20:11

## 2020-08-12 RX ADMIN — ONDANSETRON 4 MG: 2 INJECTION INTRAMUSCULAR; INTRAVENOUS at 08:25

## 2020-08-12 RX ADMIN — ACETAMINOPHEN 650 MG: 325 TABLET ORAL at 16:47

## 2020-08-12 RX ADMIN — MORPHINE SULFATE 4 MG: 4 INJECTION INTRAVENOUS at 04:19

## 2020-08-12 RX ADMIN — CLINDAMYCIN HYDROCHLORIDE 300 MG: 150 CAPSULE ORAL at 07:20

## 2020-08-12 RX ADMIN — ONDANSETRON 4 MG: 2 INJECTION INTRAMUSCULAR; INTRAVENOUS at 15:10

## 2020-08-12 RX ADMIN — Medication 6.3 MILLICURIE: at 10:55

## 2020-08-12 RX ADMIN — Medication 10 ML: at 20:12

## 2020-08-12 RX ADMIN — MORPHINE SULFATE 4 MG: 4 INJECTION INTRAVENOUS at 12:28

## 2020-08-12 RX ADMIN — CLINDAMYCIN HYDROCHLORIDE 300 MG: 150 CAPSULE ORAL at 16:51

## 2020-08-12 RX ADMIN — FAMOTIDINE 20 MG: 10 INJECTION, SOLUTION INTRAVENOUS at 08:05

## 2020-08-12 RX ADMIN — SODIUM CHLORIDE 500 ML: 9 INJECTION, SOLUTION INTRAVENOUS at 04:28

## 2020-08-12 RX ADMIN — MORPHINE SULFATE 4 MG: 4 INJECTION INTRAVENOUS at 08:22

## 2020-08-12 ASSESSMENT — PAIN SCALES - GENERAL
PAINLEVEL_OUTOF10: 4
PAINLEVEL_OUTOF10: 5
PAINLEVEL_OUTOF10: 4
PAINLEVEL_OUTOF10: 8
PAINLEVEL_OUTOF10: 4
PAINLEVEL_OUTOF10: 1
PAINLEVEL_OUTOF10: 5
PAINLEVEL_OUTOF10: 10
PAINLEVEL_OUTOF10: 4
PAINLEVEL_OUTOF10: 2

## 2020-08-12 ASSESSMENT — PAIN DESCRIPTION - LOCATION
LOCATION: ABDOMEN

## 2020-08-12 ASSESSMENT — PAIN DESCRIPTION - PAIN TYPE
TYPE: ACUTE PAIN

## 2020-08-12 ASSESSMENT — PAIN DESCRIPTION - ONSET: ONSET: ON-GOING

## 2020-08-12 ASSESSMENT — PAIN DESCRIPTION - FREQUENCY: FREQUENCY: CONTINUOUS

## 2020-08-12 NOTE — ED PROVIDER NOTES
CHIEF COMPLAINT  Abdominal Pain (pt reports has had enlarged spleen for few years. saw sedrick today. was told needed removed. reports stood up off couch and developed pain in luq and left shoulder. pt pale cool dry. ems started iv left ac. fentanyl 50mcg given with no relief)      HISTORY OF PRESENT ILLNESS  Apple Rodríguez II is a 31 y.o. male presents to the ED with abdominal pain, left upper quadrant, started just prior to arrival when he stood up from sitting on the couch, seems to be radiating or referred from the abdomen to the left shoulder, the shoulder itself does not hurt to touch or move, just the top of his shoulder is painful, brought in by EMS, given 50 mics of fentanyl, no significant improvement in pain, he had not taken anything for pain at home, reportedly saw Dr. Corrie Ferguson hematologist and told he probably needs to get his spleen removed, his white blood cells, hemoglobin, and platelets were low on their labs yesterday, white count 2.2, hemoglobin 10.6, and platelets 27, no history of thrombocytopenia in the past, patient reports no melena or hematochezia, no hemoptysis, no hematemesis, no hematuria, no source of bleeding that he is aware of. No history of bleeding or clotting disorders, no history of PE or DVT, no chest pain or shortness of breath currently, but it hurts to take a deep breath, no significant nausea/vomiting/diarrhea, having normal bowel movements, no fevers, reportedly pale per EMS, no cough or coronavirus exposure that he is aware of, has been treated with clindamycin for cellulitis which seems to be improving in his right lower extremity, no other complaints, modifying factors or associated symptoms. I have reviewed the following from the nursing documentation.     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 No pertinent family history.   Social History     Socioeconomic History    Marital status:      Spouse name: Not on file    Number of children: Not on file    Years of education: Not on file    Highest education level: Not on file   Occupational History    Not on file   Social Needs    Financial resource strain: Not on file    Food insecurity     Worry: Not on file     Inability: Not on file    Transportation needs     Medical: Not on file     Non-medical: Not on file   Tobacco Use    Smoking status: Never Smoker    Smokeless tobacco: Current User     Types: Chew   Substance and Sexual Activity    Alcohol use: Yes     Comment: occassionally    Drug use: No    Sexual activity: Yes     Partners: Female   Lifestyle    Physical activity     Days per week: Not on file     Minutes per session: Not on file    Stress: Not on file   Relationships    Social connections     Talks on phone: Not on file     Gets together: Not on file     Attends Alevism service: Not on file     Active member of club or organization: Not on file     Attends meetings of clubs or organizations: Not on file     Relationship status: Not on file    Intimate partner violence     Fear of current or ex partner: Not on file     Emotionally abused: Not on file     Physically abused: Not on file     Forced sexual activity: Not on file   Other Topics Concern    Not on file   Social History Narrative    Not on file     Current Facility-Administered Medications   Medication Dose Route Frequency Provider Last Rate Last Dose    0.9 % sodium chloride bolus  20 mL Intravenous Once Arpit Valenzuela, DO         Current Outpatient Medications   Medication Sig Dispense Refill    clindamycin (CLEOCIN) 300 MG capsule Take 300 mg by mouth 4 times daily      PROAIR  (90 Base) MCG/ACT inhaler Take 108 mcg by mouth as needed      ibuprofen (IBU) 800 MG tablet Take 1 tablet by mouth every 8 hours as needed for Pain 20 tablet 0    Acetaminophen (TYLENOL EXTRA STRENGTH PO) Take 1,000 mg by mouth daily as needed       Allergies   Allergen Reactions    Contrast [Iodides]        REVIEW OF SYSTEMS  10 systems reviewed, pertinent positives per HPI otherwise noted to be negative. PHYSICAL EXAM  /62   Pulse 102   Temp 98 °F (36.7 °C) (Oral)   Resp 18   Ht 6' 6\" (1.981 m)   Wt 27 lb 8 oz (12.5 kg)   SpO2 100%   BMI 3.18 kg/m²   GENERAL APPEARANCE: Awake and alert. Cooperative. No acute distress, but appears uncomfortable  HEAD: Normocephalic. Atraumatic. EYES: PERRL. EOM's grossly intact. ENT: Mucous membranes are tacky  NECK: Supple. No rigidity  HEART: RRR, borderline tachycardic around 100. No murmurs  LUNGS: Respirations nonlabored. Lungs are clear to auscultation bilaterally,. ABDOMEN: Soft. Non-distended. . No guarding or rebound. Normal bowel sounds. Left CVA/left flank tenderness to palpation, left upper quadrant tenderness, due to body habitus difficult to palpate liver and spleen borders, obese abdomen, rotund  EXTREMITIES: No peripheral edema. Moves all extremities equally. All extremities neurovascularly intact. SKIN: Warm and dry. No acute rashes. Pale appearing  NEUROLOGICAL: Alert and oriented. No gross facial drooping. Strength 5/5, sensation intact. No truncal ataxia. Normal speech  PSYCHIATRIC: Normal mood and affect. LABS  I have reviewed all labs for this visit.    Results for orders placed or performed during the hospital encounter of 08/12/20   CBC Auto Differential   Result Value Ref Range    WBC 1.6 (LL) 4.0 - 11.0 K/uL    RBC 2.89 (L) 4.20 - 5.90 M/uL    Hemoglobin 8.5 (L) 13.5 - 17.5 g/dL    Hematocrit 25.8 (L) 40.5 - 52.5 %    MCV 89.3 80.0 - 100.0 fL    MCH 29.3 26.0 - 34.0 pg    MCHC 32.8 31.0 - 36.0 g/dL    RDW 16.4 (H) 12.4 - 15.4 %    Platelets 746 415 - 307 K/uL    MPV 8.0 5.0 - 10.5 fL    PLATELET SLIDE REVIEW Adequate     SLIDE REVIEW see below     Path Consult Yes     Neutrophils % 36.5 %    Lymphocytes % 45.6 %    Monocytes % 15.7 %    Eosinophils % 1.1 %    Basophils % 1.1 %    Neutrophils Absolute 0.6 (LL) 1.7 - 7.7 K/uL    Lymphocytes Absolute 0.7 (L) 1.0 - 5.1 K/uL    Monocytes Absolute 0.2 0.0 - 1.3 K/uL    Eosinophils Absolute 0.0 0.0 - 0.6 K/uL    Basophils Absolute 0.0 0.0 - 0.2 K/uL   Comprehensive Metabolic Panel w/ Reflex to MG   Result Value Ref Range    Sodium 134 (L) 136 - 145 mmol/L    Potassium reflex Magnesium 4.1 3.5 - 5.1 mmol/L    Chloride 99 99 - 110 mmol/L    CO2 23 21 - 32 mmol/L    Anion Gap 12 3 - 16    Glucose 120 (H) 70 - 99 mg/dL    BUN 18 7 - 20 mg/dL    CREATININE 0.7 (L) 0.9 - 1.3 mg/dL    GFR Non-African American >60 >60    GFR African American >60 >60    Calcium 8.9 8.3 - 10.6 mg/dL    Total Protein 7.5 6.4 - 8.2 g/dL    Alb 4.1 3.4 - 5.0 g/dL    Albumin/Globulin Ratio 1.2 1.1 - 2.2    Total Bilirubin 0.9 0.0 - 1.0 mg/dL    Alkaline Phosphatase 66 40 - 129 U/L    ALT 43 (H) 10 - 40 U/L    AST 48 (H) 15 - 37 U/L    Globulin 3.4 g/dL   Lipase   Result Value Ref Range    Lipase 16.0 13.0 - 60.0 U/L   Protime-INR   Result Value Ref Range    Protime 12.5 10.0 - 13.2 sec    INR 1.08 0.86 - 1.14   APTT   Result Value Ref Range    aPTT 29.5 24.2 - 36.2 sec   CBC   Result Value Ref Range    WBC 1.7 (LL) 4.0 - 11.0 K/uL    RBC 2.73 (L) 4.20 - 5.90 M/uL    Hemoglobin 7.9 (L) 13.5 - 17.5 g/dL    Hematocrit 24.0 (L) 40.5 - 52.5 %    MCV 87.7 80.0 - 100.0 fL    MCH 28.8 26.0 - 34.0 pg    MCHC 32.8 31.0 - 36.0 g/dL    RDW 16.1 (H) 12.4 - 15.4 %    Platelets 361 912 - 615 K/uL    MPV 7.7 5.0 - 10.5 fL           RADIOLOGY  Ct Abdomen Pelvis Wo Contrast Additional Contrast? None    Result Date: 8/12/2020  EXAMINATION: CT OF THE ABDOMEN AND PELVIS WITHOUT CONTRAST 8/12/2020 3:29 am TECHNIQUE: CT of the abdomen and pelvis was performed without the administration of intravenous contrast. Multiplanar reformatted images are 74-year-old male with history of splenomegaly, and new left upper quadrant pain, no obvious findings on CT, but he does have notable changes in his lab work, worsening leukocytopenia/neutropenia, worsening anemia, given the reported hemoglobin of 10.6 on yesterday's labs, and dropped from 8.5-7.9 here, decided to initiate blood transfusion, the platelets of 27 on yesterday's labs were not consistent with ours at 188, thus I repeated CBC, and 194 on repeat, thus I do not feel the platelet count was accurate before, he is not having any bleeding, denied melena or hematochezia, denied hematemesis, hemoptysis, hematuria, or any other source of bleeding, thus when I spoke to Dr. aZk Moore, he is concerned the spleen may be the cause and believe the patient warrants nonemergent surgical consultation since he will be admitted to the hospital for the anemia issue, patient was pale and in notable pain, given medication for pain/nausea, patient did request the clindamycin for his lower extremity cellulitis that he is being treated for, and states this does appear to be helping thus I did not change antibiotics, spoke to PA with hospitalist service for admission, patient verbalized understanding of plan to stay in the hospital.    Orders Placed This Encounter   Procedures    CT ABDOMEN PELVIS WO CONTRAST Additional Contrast? None    CBC Auto Differential    Comprehensive Metabolic Panel w/ Reflex to MG    Lipase    Urinalysis Reflex to Culture    Protime-INR    APTT    CBC    Hemoglobin and Hematocrit, Blood, Post Transfusion    VITAL SIGNS PER TRANSFUSION PROTOCOL    Verify informed consent    TRANSFUSION REACTION MANAGEMENT    Inpatient consult to Oncology    Inpatient consult to Hospitalist    TYPE AND SCREEN    PREPARE RBC (CROSSMATCH), 1 Units     Orders Placed This Encounter   Medications    morphine sulfate (PF) injection 4 mg    0.9 % sodium chloride bolus    clindamycin (CLEOCIN) capsule 300 mg    0.9 % sodium chloride bolus     ED Course as of Aug 12 0928   Wed Aug 12, 2020   0702 Hx of splenomegaly  LUQ pain  Reported that he had thrombocytopenia- not seen on labs today  Dropping hemoglobin, leukopenia  No known bleeding source  Surgery consulted routine  Admitted to hospitalist  On clinda for cellulitis q6h    [ER]   0705 I spoke to Dr. Alejandro Greene with hematology he did not feel there is any acute issue at this time to call general surgery for, but they can be consulted for evaluation for splenic biopsy versus splenectomy since he will be admitted to hospitalist service. [SY]   [id number] I spoke to JOYCE Ruiz with hospitalist service, explained the patient's current situation and that I had spoken to oncology/hematology and recommended nonemergent general surgery consultation for the splenomegaly. [SY]   [id number] Patient now complaining of heartburn after receiving antibiotic. Ordered Pepcid. [SY]      ED Course User Index  [ER] Cira Portillo MD  [SY] Yonny Isidro DO       CLINICAL IMPRESSION  1. Splenomegaly    2. Abdominal pain, left upper quadrant    3. Anemia, unspecified type    4. Neutropenia, unspecified type ([address]    5. History of cellulitis    6. Morbidly obese (HCC)        Blood pressure 128/62, pulse 102, temperature 98 °F (36.7 °C), temperature source Oral, resp. rate 18, height 6' 6\" (1.981 m), weight 27 lb 8 oz (12.5 kg), SpO2 100 %. DISPOSITION  Flaca You II was admitted in stable condition.                   Yonny Isidro DO  08/12/20 1016

## 2020-08-12 NOTE — PROGRESS NOTES
Consult has been called to Dr. Lori Tijerina on 8/12/20. Spoke with joyce.  2:26 PM    Anahi Urban  8/12/2020

## 2020-08-12 NOTE — CONSULTS
HEMATOLOGY/ONCOLOGY CONSULTATION:     8/12/2020 7:46 AM    REASON FOR CONSULT: Leukopenia, splenomegaly    PROVIDERS:  Mago Vargas    CHIEF COMPLAINT:     Chief Complaint   Patient presents with    Abdominal Pain     pt reports has had enlarged spleen for few years. saw sedrick today. was told needed removed. reports stood up off couch and developed pain in luq and left shoulder. pt pale cool dry. ems started iv left ac. fentanyl 50mcg given with no relief       HISTORY OF PRESENT ILLNESS:     HPI:  31 WM known to our practice with splenomegaly and leukopenia. He was initially seen in 2018 with persistent leukopenia and splenomegaly with a 25 cm spleen. He had a workup and BMBX in 2018 which were essentially unremarkable. We asked for a PT scan but his weight exceeded the limit. He stopped following up. He was referred back and seen in the office yesterday. He has been on several recent antibiotics for cellulitis and most recently on clindamycin. His plt count had dropped yesterday but it was suspected possibly lab error. His Hgb yesterday was 10.7. We had discussed pursuing a spleen biopsy vs surgical referral for splenectomy given unknown etiology. He was referred to general surgery. He presented to the ER with L sided abdominal pain that has worsened this AM. He denies any fever.  His Hgb has dropped but plt count is now normal.     PAST MEDICAL HISTORY:     Past Medical History:   Diagnosis Date    Asthma     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    Enlarged heart        PAST SURGICAL HISTORY:        Past Surgical History:   Procedure Laterality Date    TYMPANOSTOMY TUBE PLACEMENT         SOCIAL HISTORY:     Social History     Socioeconomic History    Marital status:      Spouse name: Not on file    Number of children: Not on file    Years of education: Not on file    Highest education level: Not on file   Occupational History    Not on file   Social Needs    Financial resource strain: Not on file    Food insecurity     Worry: Not on file     Inability: Not on file    Transportation needs     Medical: Not on file     Non-medical: Not on file   Tobacco Use    Smoking status: Never Smoker    Smokeless tobacco: Current User     Types: Chew   Substance and Sexual Activity    Alcohol use: Yes     Comment: occassionally    Drug use: No    Sexual activity: Yes     Partners: Female   Lifestyle    Physical activity     Days per week: Not on file     Minutes per session: Not on file    Stress: Not on file   Relationships    Social connections     Talks on phone: Not on file     Gets together: Not on file     Attends [employer]ism service: Not on file     Active member of club or organization: Not on file     Attends meetings of clubs or organizations: Not on file     Relationship status: Not on file    Intimate partner violence     Fear of current or ex partner: Not on file     Emotionally abused: Not on file     Physically abused: Not on file     Forced sexual activity: Not on file   Other Topics Concern    Not on file   Social History Narrative    Not on file       FAMILY HISTORY:     History reviewed. No pertinent family history. ALLERGIES:     Allergies as of 08/12/2020 - Review Complete 08/12/2020   Allergen Reaction Noted    Contrast [iodides]  05/16/2019       MEDICATIONS:     No current facility-administered medications on file prior to encounter. Current Outpatient Medications on File Prior to Encounter   Medication Sig Dispense Refill    clindamycin (CLEOCIN) 300 MG capsule Take 300 mg by mouth 4 times daily      PROAIR  (90 Base) MCG/ACT inhaler Take 108 mcg by mouth as needed      ibuprofen (IBU) 800 MG tablet Take 1 tablet by mouth every 8 hours as needed for Pain 20 tablet 0    Acetaminophen (TYLENOL EXTRA STRENGTH PO) Take 1,000 mg by mouth daily as needed       REVIEW OF SYSTEMS:       10 point ROS completed. Peripheral blood:   - Leukopenia with absolute neutropenia and normochromic, normocytic   anemia. COMMENT: No current blood smear was available, but a blood smear from   12/5/18 was reviewed. The corresponding cytogenetic and molecular   analyses are pending at the time of this report. BUCCA/BUCCA     Result:   JAK2 V617F Mutation: Not Detected   JAK2, EXON 12-14 Mutation: Not Detected   Calreticulin (CALR) Mutation: Not Detected   MPL Mutation Not Detected     INTERPRETATION:     Bone Marrow: No phenotypically abnormal cell population identified. Suggest correlation of phenotype with clinical, morphologic, and other   pertinent laboratory findings, as well as other ancillary studies, as   appropriate.        ASSESSMENT:     Problem List Items Addressed This Visit     None      Visit Diagnoses     Splenomegaly    -  Primary    Abdominal pain, left upper quadrant        Anemia, unspecified type        Neutropenia, unspecified type ([address]        History of cellulitis        Morbidly obese (HCC)                    PLAN:     Splenomegaly  25 cm, noted on repeated CT scans  Stable in size on CT 2/2019, 8/12/2020  BMBX negative 12/2018  SAMSON-2 negative  Autoimmune workup negatve  PET unable to be done due to his weight  No known liver disease  Check liver spleen scan  Had placed referral to general surgery outpatient for opinion on diagnostic/therapeutic splenectomy or biopsy - recommned asking them to see while admitted    Anemia  Hgb low normal at baseline  Was 10.7 in office yesterday, now 7.9  Check iron studies, hemolytic panel, B12, folate  Has had recent infection and been on abx  May need GI consult    Thrombocytopenia  Plt 27 in office yesterday  They were normal in the past and normal on repeat today  Suspect low plt count 8/11/2020 was lab error    Leukopenia  Noted to have chronic persistent leukopenia   Previously checked NIRAV, rheumatoid factor to r/o Felty's syndrome, Copper, TSH, SPEP, LDH, haptoglobin which were unremarkable  BMBX negative 12/2018  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

## 2020-08-12 NOTE — ED NOTES
RN informed Dr. Jarrod Arango of pt diastolic BP of 58 at most recent vitals. No new orders at this time.       Esthela Reynolds RN  08/12/20 1012

## 2020-08-12 NOTE — ED NOTES
RN took call from lab reporting delay on blood product preparation. Lab will call when blood is ready.       Juamna Wilcox RN  08/12/20 8397

## 2020-08-12 NOTE — PLAN OF CARE
Admit to Med Surg    Acute on Chronic Anemia  Chronic Idiopathic Neutropenia/leukopenia  Splenomegaly    Has followed with Dr. Fabien Cook - BM biopsy unremarkable  Had labs completed at Fabien Cook office yesterday with Hgb of ~10, down to 7.9 today.  No concerns for bleeding per ED  - ED spoke with herms about admission, he requested non-emergent gen surg consult for poss splenectomy vs spleen biopsy    Of note: currently on Clindamycin for cellulitis treated OP, will continue here

## 2020-08-12 NOTE — DISCHARGE INSTR - COC
Continuity of Care Form    Patient Name: Frances Prater   :  1990  MRN:  [id number]    Admit date:  2020  Discharge date:  ***    Code Status Order: Full Code   Advance Directives:   [facility] Documentation     Date/Time Healthcare Directive Type of Healthcare Directive Copy in [facility] [address] Agent's Name Healthcare Agent's Phone Number    20 1437  No, patient does not have an advance directive for healthcare treatment -- -- -- -- --          Admitting Physician:  Tatiana Zaman MD  PCP: Luh Bermudez    Discharging Nurse: [facility] Unit/Room#: 0223/0223-02  Discharging Unit Phone Number: ***    Emergency Contact:   Extended Emergency Contact Information  Primary Emergency Contact: Shanta Bettye   [address] Phone: [phone]  Relation: Parent    Past Surgical History:  Past Surgical History:   Procedure Laterality Date    TYMPANOSTOMY TUBE PLACEMENT         Immunization History:   Immunization History   Administered Date(s) Administered    Tdap (Boostrix, Adacel) 2019       Active Problems:  Patient Active Problem List   Diagnosis Code    Chest pain R07.9    Congenital abnormality Q89.9    Anemia D64.9    Splenomegaly R16.1    LUQ abdominal pain R10.12    Other neutropenia ([address] D70.8    Neutropenia ([address] D70.9    Cellulitis of leg, right L03.115       Isolation/Infection:   Isolation          No Isolation        Patient Infection Status     None to display          Nurse Assessment:  Last Vital Signs: /72   Pulse 99   Temp 98.4 °F (36.9 °C) (Oral)   Resp 18   Ht 6' 6\" (1.981 m)   Wt (!) 440 lb (199.6 kg)   SpO2 96%   BMI 50.85 kg/m²     Last documented pain score (0-10 scale): Pain Level: 8  Last Weight:   Wt Readings from Last 1 Encounters:   20 (!) 440 lb (199.6 kg)     Mental Status:  {IP PT MENTAL STATUS:}    IV Access:  [facility] IV [facility]:070331654}    Nursing Mobility/ADLs:  Walking   {CHP DME VUJR:851341357}  Transfer  {CHP DME ZCWH:306108661}  Bathing  {CHP DME UIA}  Dressing  {CHP DME HAQO:321900249}  Toileting  {CHP DME LAHC:654947482}  Feeding  {CHP DME JLGO:350671700}  Med Admin  {P DME UMNF:756534653}  Med Delivery   {[facility] MED Delivery:111506484}    Wound Care Documentation and Therapy:  Wound 20 Back Lateral;Left;Lower pt states it is an abcess that he has been dealing with for a few weeks (Active)   Number of days: 88        Elimination:  Continence:   · Bowel: {YES / RK:68206}  · Bladder: {YES / KA:75062}  Urinary Catheter: {Urinary Catheter:603711083}   Colostomy/Ileostomy/Ileal Conduit: {YES / WP:29867}       Date of Last BM: ***  No intake or output data in the 24 hours ending 20 1448  No intake/output data recorded.     Safety Concerns:     [facility] Safety Concerns:761118446}    Impairments/Disabilities:      [facility] Impairments/Disabilities:890133491}    Nutrition Therapy:  Current Nutrition Therapy:   [facility] Diet List:815634457}    Routes of Feeding: {P DME Other Feedings:156018041}  Liquids: {Slp liquid thickness:13268}  Daily Fluid Restriction: {CHP DME Yes amt example:628441596}  Last Modified Barium Swallow with Video (Video Swallowing Test): {Done Not Done IKWH:841575955}    Treatments at the Time of Hospital Discharge:   Respiratory Treatments: ***  Oxygen Therapy:  {Therapy; copd oxygen:89499}  Ventilator:    { CC Vent GINQ:757864913}    Rehab Therapies: {THERAPEUTIC INTERVENTION:3273026494}  Weight Bearing Status/Restrictions: [address] [employer] Weight Bearin}  Other Medical Equipment (for information only, NOT a DME order):  {EQUIPMENT:347221267}  Other Treatments: ***    Patient's personal belongings (please select all that are sent with patient):  {P DME Belongings:330755899}    RN SIGNATURE:  {Esignature:953970154}    CASE MANAGEMENT/SOCIAL WORK SECTION    Inpatient Status Date: ***    Readmission Risk Assessment Score:  Readmission Risk              Risk of Unplanned Readmission:        7           Discharging to Facility/ Agency   · Name:   · Address:  · Phone:  · Fax:    Dialysis Facility (if applicable)   · Name:  · Address:  · Dialysis Schedule:  · Phone:  · Fax:    / signature: {Esignature:081030079}    PHYSICIAN SECTION    Prognosis: {Prognosis:6926202742}    Condition at Discharge: [address] [place] [address] Patient Condition:790350377}    Rehab Potential (if transferring to Rehab): {Prognosis:3985121924}    Recommended Labs or Other Treatments After Discharge: ***    Physician Certification: I certify the above information and transfer of Itzel Wan II  is necessary for the continuing treatment of the diagnosis listed and that he requires {Admit to Appropriate Level of Care:36812} for {GREATER/LESS:778749681} 30 days.      Update Admission H&P: {CHP DME Changes in CNMYP:729998607}    PHYSICIAN SIGNATURE:  {Esignature:001228576}

## 2020-08-12 NOTE — ED NOTES
Dr. Alex Rebolledo called back at 6057 and spoke to Dr. Eduardo Sims.       Sandy Olsen  82/41/11 3795

## 2020-08-12 NOTE — ED NOTES
9678- Perfect serve sent to Dr. Dorian Banerjee for admission     9700- Call was returned by Sonu Khalil  And spoke to Dr. Sotero Phillips  08/12/20 7871       Eufemia Murillo  08/12/20 8476

## 2020-08-12 NOTE — H&P
Hospital Medicine History & Physical      PCP: Marielena Bowen    Date of Admission: 8/12/2020    Date of Service: Pt seen/examined on 8/12/2020     Chief Complaint:    Chief Complaint   Patient presents with    Abdominal Pain     pt reports has had enlarged spleen for few years. saw sedrick today. was told needed removed. reports stood up off couch and developed pain in luq and left shoulder. pt pale cool dry. ems started iv left ac. fentanyl 50mcg given with no relief     History Of Present Illness: The patient is a 31 y.o. morbidly obese male with splenomegaly and chronic leukopenia who presented to [facility] ED with complaint of abdominal pain. Pain is located in the LUQ and also at the tip of his left shoulder. Pain started last evening. He describes it as a pressure aching sensation. It is aggravated by taking a deep breath, movement and alleviated by nothing. It was associated with nausea, but denies and vomiting or diarrhea. He denies chest pain, cough, shortness of breath, fever or chills. Denies bloody stools. Drinks alcohol socially and chews tobacco, denies illicit drugs. Takes NSAIDs PRN pain, has been taking them consistently x 2 weeks for cellulitis pain. No history of PUD or GI Bleeding. He has known splenomegaly and leukopenia for which he f/w Dr. Marcella Vasquez. He was last seen in 2018 . At that time he underwent extensive work up including BMBX which was unremarkable. A PET scan was ordered but unable to be completed 2/2 pt weight. He was seen at [facility] ER 8/7/2020 for RLE cellulitis at which time CBC drawn showed leukopenia again, he was instructed to f/u with hem/onc which he did yesterday 8/11. He was given a general surgery referral for consideration of splenectomy     In the ER his hb was 7.9 (per Dr. Avinash Ford documentation it was 10.7 yesterday in hem/onc office).   Hem/onc was consulted from ER with recommendation for admission for further work up and gen surg consult for splenectomy vs biopsy. Past Medical History:        Diagnosis Date    Asthma     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    Enlarged heart        Past Surgical History:        Procedure Laterality Date    TYMPANOSTOMY TUBE PLACEMENT         Medications Prior to Admission:    Prior to Admission medications    Medication Sig Start Date End Date Taking? Authorizing Provider   clindamycin (CLEOCIN) 300 MG capsule Take 300 mg by mouth 4 times daily   Yes Historical Provider, MD   PROAIR  (80 Base) MCG/ACT inhaler Take 108 mcg by mouth as needed 2/9/20  Yes Historical Provider, MD   ibuprofen (IBU) 800 MG tablet Take 1 tablet by mouth every 8 hours as needed for Pain 5/16/19   Rosario Aleman PA-C   Acetaminophen (TYLENOL EXTRA STRENGTH PO) Take 1,000 mg by mouth daily as needed    Historical Provider, MD       Allergies:  Contrast [iodides]    Social History:  The patient currently lives at home. He works as EMT/    TOBACCO:   reports that he has never smoked. His smokeless tobacco use includes chew. ETOH:   reports current alcohol use. Family History:   Positive as follows:    History reviewed. No pertinent family history. REVIEW OF SYSTEMS:       Constitutional: Negative for fever   HENT: Negative for sore throat   Eyes: Negative for redness   Respiratory: Negative  for dyspnea, cough   Cardiovascular: Negative for chest pain   Gastrointestinal: Negative for vomiting, diarrhea   + abdominal pain   Genitourinary: Negative for hematuria   Musculoskeletal: Negative for arthralgias   Skin: + for rash   Neurological: Negative for syncope   Hematological: Negative for adenopathy   Psychiatric/Behavorial: Negative for anxiety    PHYSICAL EXAM:    /66   Pulse 97   Temp 98 °F (36.7 °C) (Oral)   Resp 22   Ht 6' 6\" (1.981 m)   Wt 27 lb 8 oz (12.5 kg)   SpO2 95%   BMI 3.18 kg/m²     Gen: Morbidly obese young male. No distress. Alert.    Eyes:

## 2020-08-13 ENCOUNTER — ANESTHESIA EVENT (OUTPATIENT)
Dept: OPERATING ROOM | Age: 30
DRG: 800 | End: 2020-08-13
Payer: COMMERCIAL

## 2020-08-13 LAB
ANION GAP SERPL CALCULATED.3IONS-SCNC: 7 MMOL/L (ref 3–16)
ANISOCYTOSIS: ABNORMAL
ATYPICAL LYMPHOCYTE RELATIVE PERCENT: 8 % (ref 0–6)
BANDED NEUTROPHILS RELATIVE PERCENT: 1 % (ref 0–7)
BASOPHILS ABSOLUTE: 0 K/UL (ref 0–0.2)
BASOPHILS RELATIVE PERCENT: 0 %
BUN BLDV-MCNC: 14 MG/DL (ref 7–20)
CALCIUM SERPL-MCNC: 9.3 MG/DL (ref 8.3–10.6)
CHLORIDE BLD-SCNC: 100 MMOL/L (ref 99–110)
CO2: 28 MMOL/L (ref 21–32)
CREAT SERPL-MCNC: 0.7 MG/DL (ref 0.9–1.3)
EOSINOPHILS ABSOLUTE: 0 K/UL (ref 0–0.6)
EOSINOPHILS RELATIVE PERCENT: 1 %
GFR AFRICAN AMERICAN: >60
GFR NON-AFRICAN AMERICAN: >60
GLUCOSE BLD-MCNC: 123 MG/DL (ref 70–99)
HCT VFR BLD CALC: 24.8 % (ref 40.5–52.5)
HCT VFR BLD CALC: 27.2 % (ref 40.5–52.5)
HCT VFR BLD CALC: 27.7 % (ref 40.5–52.5)
HEMATOLOGY PATH CONSULT: NORMAL
HEMATOLOGY PATH CONSULT: YES
HEMOGLOBIN: 8.2 G/DL (ref 13.5–17.5)
HEMOGLOBIN: 8.8 G/DL (ref 13.5–17.5)
HEMOGLOBIN: 9.2 G/DL (ref 13.5–17.5)
HYPOCHROMIA: ABNORMAL
LYMPHOCYTES ABSOLUTE: 0.8 K/UL (ref 1–5.1)
LYMPHOCYTES RELATIVE PERCENT: 47 %
MCH RBC QN AUTO: 29.3 PG (ref 26–34)
MCHC RBC AUTO-ENTMCNC: 32.9 G/DL (ref 31–36)
MCV RBC AUTO: 89.1 FL (ref 80–100)
MONOCYTES ABSOLUTE: 0.1 K/UL (ref 0–1.3)
MONOCYTES RELATIVE PERCENT: 10 %
NEUTROPHILS ABSOLUTE: 0.5 K/UL (ref 1.7–7.7)
NEUTROPHILS RELATIVE PERCENT: 33 %
NUCLEATED RED BLOOD CELLS: 2 /100 WBC
NUCLEATED RED BLOOD CELLS: 2 /100 WBC
OCCULT BLOOD DIAGNOSTIC: NORMAL
PDW BLD-RTO: 16.5 % (ref 12.4–15.4)
PLATELET # BLD: 180 K/UL (ref 135–450)
PLATELET SLIDE REVIEW: ADEQUATE
PMV BLD AUTO: 7.8 FL (ref 5–10.5)
POIKILOCYTES: ABNORMAL
POLYCHROMASIA: ABNORMAL
POTASSIUM REFLEX MAGNESIUM: 4.7 MMOL/L (ref 3.5–5.1)
RBC # BLD: 2.79 M/UL (ref 4.2–5.9)
SARS-COV-2, NAAT: NOT DETECTED
SLIDE REVIEW: ABNORMAL
SODIUM BLD-SCNC: 135 MMOL/L (ref 136–145)
SOLUBLE TRANSFERRIN RECEPT: 12.3 MG/L (ref 2.2–5)
WBC # BLD: 1.4 K/UL (ref 4–11)

## 2020-08-13 PROCEDURE — 82104 ALPHA-1-ANTITRYPSIN PHENO: CPT

## 2020-08-13 PROCEDURE — 88184 FLOWCYTOMETRY/ TC 1 MARKER: CPT

## 2020-08-13 PROCEDURE — 80048 BASIC METABOLIC PNL TOTAL CA: CPT

## 2020-08-13 PROCEDURE — 85018 HEMOGLOBIN: CPT

## 2020-08-13 PROCEDURE — 2580000003 HC RX 258: Performed by: PHYSICIAN ASSISTANT

## 2020-08-13 PROCEDURE — 85014 HEMATOCRIT: CPT

## 2020-08-13 PROCEDURE — 83516 IMMUNOASSAY NONANTIBODY: CPT

## 2020-08-13 PROCEDURE — 85045 AUTOMATED RETICULOCYTE COUNT: CPT

## 2020-08-13 PROCEDURE — 80074 ACUTE HEPATITIS PANEL: CPT

## 2020-08-13 PROCEDURE — U0002 COVID-19 LAB TEST NON-CDC: HCPCS

## 2020-08-13 PROCEDURE — 82105 ALPHA-FETOPROTEIN SERUM: CPT

## 2020-08-13 PROCEDURE — 6370000000 HC RX 637 (ALT 250 FOR IP): Performed by: INTERNAL MEDICINE

## 2020-08-13 PROCEDURE — 85025 COMPLETE CBC W/AUTO DIFF WBC: CPT

## 2020-08-13 PROCEDURE — 6370000000 HC RX 637 (ALT 250 FOR IP): Performed by: PHYSICIAN ASSISTANT

## 2020-08-13 PROCEDURE — 88185 FLOWCYTOMETRY/TC ADD-ON: CPT

## 2020-08-13 PROCEDURE — 82103 ALPHA-1-ANTITRYPSIN TOTAL: CPT

## 2020-08-13 PROCEDURE — 1200000000 HC SEMI PRIVATE

## 2020-08-13 PROCEDURE — G0328 FECAL BLOOD SCRN IMMUNOASSAY: HCPCS

## 2020-08-13 PROCEDURE — 86038 ANTINUCLEAR ANTIBODIES: CPT

## 2020-08-13 PROCEDURE — 99232 SBSQ HOSP IP/OBS MODERATE 35: CPT | Performed by: INTERNAL MEDICINE

## 2020-08-13 PROCEDURE — 36415 COLL VENOUS BLD VENIPUNCTURE: CPT

## 2020-08-13 RX ORDER — PANTOPRAZOLE SODIUM 40 MG/1
40 TABLET, DELAYED RELEASE ORAL
Status: DISCONTINUED | OUTPATIENT
Start: 2020-08-13 | End: 2020-08-18 | Stop reason: HOSPADM

## 2020-08-13 RX ORDER — SODIUM CHLORIDE, SODIUM LACTATE, POTASSIUM CHLORIDE, CALCIUM CHLORIDE 600; 310; 30; 20 MG/100ML; MG/100ML; MG/100ML; MG/100ML
INJECTION, SOLUTION INTRAVENOUS CONTINUOUS
Status: CANCELLED | OUTPATIENT
Start: 2020-08-13

## 2020-08-13 RX ORDER — SODIUM CHLORIDE 0.9 % (FLUSH) 0.9 %
10 SYRINGE (ML) INJECTION EVERY 12 HOURS SCHEDULED
Status: CANCELLED | OUTPATIENT
Start: 2020-08-13

## 2020-08-13 RX ORDER — SODIUM CHLORIDE 0.9 % (FLUSH) 0.9 %
10 SYRINGE (ML) INJECTION PRN
Status: CANCELLED | OUTPATIENT
Start: 2020-08-13

## 2020-08-13 RX ORDER — PREDNISONE 20 MG/1
60 TABLET ORAL 2 TIMES DAILY
Status: DISCONTINUED | OUTPATIENT
Start: 2020-08-13 | End: 2020-08-14

## 2020-08-13 RX ADMIN — CLINDAMYCIN HYDROCHLORIDE 300 MG: 150 CAPSULE ORAL at 08:31

## 2020-08-13 RX ADMIN — CLINDAMYCIN HYDROCHLORIDE 300 MG: 150 CAPSULE ORAL at 23:17

## 2020-08-13 RX ADMIN — Medication 10 ML: at 08:31

## 2020-08-13 RX ADMIN — ACETAMINOPHEN 650 MG: 325 TABLET ORAL at 11:08

## 2020-08-13 RX ADMIN — CLINDAMYCIN HYDROCHLORIDE 300 MG: 150 CAPSULE ORAL at 14:14

## 2020-08-13 RX ADMIN — PREDNISONE 60 MG: 20 TABLET ORAL at 08:31

## 2020-08-13 RX ADMIN — Medication 10 ML: at 23:17

## 2020-08-13 RX ADMIN — PREDNISONE 60 MG: 20 TABLET ORAL at 23:17

## 2020-08-13 RX ADMIN — CLINDAMYCIN HYDROCHLORIDE 300 MG: 150 CAPSULE ORAL at 17:30

## 2020-08-13 ASSESSMENT — PAIN DESCRIPTION - PAIN TYPE: TYPE: ACUTE PAIN

## 2020-08-13 ASSESSMENT — PAIN SCALES - GENERAL
PAINLEVEL_OUTOF10: 0
PAINLEVEL_OUTOF10: 3

## 2020-08-13 ASSESSMENT — PAIN DESCRIPTION - DESCRIPTORS: DESCRIPTORS: HEADACHE

## 2020-08-13 ASSESSMENT — PAIN DESCRIPTION - LOCATION: LOCATION: HEAD

## 2020-08-13 NOTE — PROGRESS NOTES
Samantha Castillo in Lab called and notified floor that if pt needs transfusion he would have to a blood warmer during transfusion. She requested OR be notified of this also. Called Surgery main office and spoke with Yamileth Mcgarry, they are aware of pt needs. Samantha Castillo from lab also stated that lab would notify surgeon.

## 2020-08-13 NOTE — PROGRESS NOTES
Consult has been called to Dr. Yue Chen on 8/13/20. Spoke with baron.  8:53 AM    Yousuf Newberry  8/13/2020

## 2020-08-13 NOTE — PROGRESS NOTES
General Surgery - Aminata Praveen Leiamarka nthony, [facility]  Daily Progress Note    Pt Name: Breanna Resendiz II  Medical Record Number: [id number]  Date of Birth 1990   Today's Date: 8/13/2020    ASSESSMENT  1. CT abd and pelvis: splenomegaly  2. ABD: morbidly obese, soft, + LUQ tenderness to palpation, no N/V, + flatus, + flatus, + BM, no distention  3. Anemia stable: 8.2/24.8  4. Leukopenia: leuks 1.4  5. Pt reports he \"feels OK\" but has LUQ pain with movement/laughing/coughing. PLAN  1. Discussed with pharmacy: pt will get Pneumococcal, HIB, meningococcal A&B vaccination prior to discharge to home. HIB and Meningococcal B were ordered by Jessica Mathew  2. Treatment consent: splenectomy with Dr. Christian Romero. All the risks, benefits and alternatives were discussed with the patient and he agrees to proceed with the procedure. 3. GI seeing for liver preop eval.   4. General diet: NPO after midnight. 5. Plan for OR tomorrow at 0730 for splenectomy. Noreen Chopra has slightly improved from yesterday. Pain is well controlled. He has no nausea and no vomiting. He has passed flatus and has had a bowel movement. He is tolerating regular diet. Current activity is up with assistance    OBJECTIVE  VITALS:  height is 6' 6\" (1.981 m) and weight is 440 lb (199.6 kg) (abnormal). His temperature is 98.3 °F (36.8 °C). His blood pressure is 143/75 (abnormal) and his pulse is 88. His respiration is 16 and oxygen saturation is 95%. VITALS:  BP (!) 143/75   Pulse 88   Temp 98.3 °F (36.8 °C)   Resp 16   Ht 6' 6\" (1.981 m)   Wt (!) 440 lb (199.6 kg)   SpO2 95%   BMI 50.85 kg/m²   INTAKE/OUTPUT:    Intake/Output Summary (Last 24 hours) at 8/13/2020 1251  Last data filed at 8/13/2020 0602  Gross per 24 hour   Intake 180 ml   Output --   Net 180 ml     GENERAL: alert, cooperative, no distress  I/O last 3 completed shifts: In: 180 [P.O.:180]  Out: -   No intake/output data recorded.     LABS  Recent Labs     08/12/20  0259  08/12/20  0809

## 2020-08-13 NOTE — CONSULTS
[facility] SURGERY      Department of General Surgery Consult    PATIENT NAME: Steven Quiroz   YOB: 1990    ADMISSION DATE: 8/12/2020  2:39 AM      TODAY'S DATE: 8/12/2020    Reason for Consult:  Splenomegaly    Requesting Physician:  Naren    HISTORY OF PRESENT ILLNESS:              The patient is a 31 y.o. male who presents with LUQ pain that is severe and sharp and worse with a deep breath. There are no alleviating factors. He has had chronic leukopenia. He has seen Dr. Guero Capellan in the past and workup was inconclusive. He has imaging showing splenomegaly. I am asked to consider splenectomy. He denies trouble eating. Bowels work [id number]  Past Medical History:        Diagnosis Date    Asthma     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    Enlarged heart        Past Surgical History:        Procedure Laterality Date    TYMPANOSTOMY TUBE PLACEMENT         Current Medications:   Current Facility-Administered Medications: clindamycin (CLEOCIN) capsule 300 mg, 300 mg, Oral, 4x Daily  sodium chloride flush 0.9 % injection 10 mL, 10 mL, Intravenous, 2 times per day  sodium chloride flush 0.9 % injection 10 mL, 10 mL, Intravenous, PRN  acetaminophen (TYLENOL) tablet 650 mg, 650 mg, Oral, Q6H PRN **OR** acetaminophen (TYLENOL) suppository 650 mg, 650 mg, Rectal, Q6H PRN  polyethylene glycol (GLYCOLAX) packet 17 g, 17 g, Oral, Daily PRN  promethazine (PHENERGAN) tablet 12.5 mg, 12.5 mg, Oral, Q6H PRN **OR** ondansetron (ZOFRAN) injection 4 mg, 4 mg, Intravenous, Q6H PRN  morphine (PF) injection 2 mg, 2 mg, Intravenous, Q2H PRN **OR** morphine sulfate (PF) injection 4 mg, 4 mg, Intravenous, Q2H PRN  Prior to Admission medications    Medication Sig Start Date End Date Taking?  Authorizing Provider   clindamycin (CLEOCIN) 300 MG capsule Take 300 mg by mouth 4 times daily   Yes Historical Provider, MD   PROAIR  (90 Base) MCG/ACT inhaler Take 108 Labs     08/12/20  0259   AST 48*   ALT 43*   BILITOT 0.9   ALKPHOS 66       Radiology Review:  CT with splenomegaly. Liver/Spleen scan with borderline colloid shift. ASSESSMENT:  Hypersplenism with chronic leukopenia and anemia  Intractable LUQ pain  Morbid obesity    PLAN:  Case discussed with Dr. Adrian Forde. His opinion is that hematologic workup warrants splenectomy for diagnostic and therapeutic purposes. Explained proposed operation to the patient and his mother. Will tentatively plan for 8/14/2020. Will discuss vaccines with pharmacy tomorrow.         [facility]

## 2020-08-13 NOTE — PROGRESS NOTES
ONCOLOGY FOLLOW-UP:         PROBLEM LIST:       Patient Active Problem List   Diagnosis Code    Chest pain R07.9    Congenital abnormality Q89.9    Anemia D64.9    Splenomegaly R16.1    LUQ abdominal pain R10.12    Other neutropenia (HCC) D70.8    Neutropenia (HCC) D70.9    Cellulitis of leg, right L03.115       INTERVAL HISTORY:       Pt afebrile. Still having LUQ pain. Did not get blood yet due to antibodies. Hgb 8.2. REVIEW OF SYSTEMS:       10 point ROS completed. Pertinent positives in HPI, otherwise negative.      PHYSICAL EXAM:       Vitals:    08/13/20 0141   BP: 115/66   Pulse: 87   Resp: 18   Temp: 97.6 °F (36.4 °C)   SpO2: 99%       General appearance: obsese, NAD  Head: Normocephalic, without obvious abnormality, atraumatic  Neck: No palpable lymphadenopathy in supraclavicular or cervical chains  Lungs: Clear to auscultation bilaterally, no audible rales, wheezes or crackles  Heart: Regular rate and rhythm, S1, S2 normal  Abdomen: Soft, non-tender; bowel sounds normal; no masses,  no organomegaly  Extremities: without cyanosis, clubbing, edema or asymmetry  Skin: No jaundice, purpura or petechiae      LABS:     Lab Results   Component Value Date    WBC 1.4 (LL) 08/13/2020    HGB 8.2 (L) 08/13/2020    HCT 24.8 (L) 08/13/2020    MCV 89.1 08/13/2020     08/13/2020       Lab Results   Component Value Date    GLUCOSE 123 (H) 08/13/2020    BUN 14 08/13/2020    CREATININE 0.7 (L) 08/13/2020    K 4.7 08/13/2020       Lab Results   Component Value Date    ALKPHOS 66 08/12/2020    ALT 43 (H) 08/12/2020    AST 48 (H) 08/12/2020    BILITOT 0.9 08/12/2020    PROT 7.5 08/12/2020     Lab Results   Component Value Date    IRON 35 (L) 08/12/2020    TIBC 303 08/12/2020    FERRITIN 520.1 (H) 08/12/2020     Lab Results   Component Value Date    DJAZPRRU50 625 08/12/2020     Lab Results   Component Value Date    FOLATE 6.22 08/12/2020     Jluis (+)   (H)  Haptoglobin 42 (wnl)    From 2018:    TSH repeated CT scans  Stable in size on CT 2/2019, 8/12/2020  BMBX negative 12/2018  SAMSON-2 negative  Autoimmune workup negatve  PET unable to be done due to his weight  No known liver disease - will ask GI to assess since we are considering splenectomy to make sure to evidence of portal HTN or liver disease  Liver spleen scan shows (+) HSM, borderline colloid shift  Appreciate surgery input - they are considering splenectomy  Will also check flow cytometry    Anemia  Hgb low normal at baseline  Was 10.7 in office yesterday, 7.9 on admission, now 8.2  Check iron studies, hemolytic panel, B12, folate  Jluis (+), LDH mildly elevated, haptoglobin low-normal  Will stat steroids  Hold transfusion for now    Thrombocytopenia  Plt 27 in office yesterday  They were normal in the past and normal on repeat today  Suspect low plt count 8/11/2020 was lab error    Leukopenia  Noted to have chronic persistent leukopenia   Previously checked NIRAV, rheumatoid factor to r/o Felty's syndrome, Copper, TSH, SPEP, LDH, haptoglobin which were unremarkable  BMBX negative 12/2018  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

## 2020-08-13 NOTE — ANESTHESIA PRE PROCEDURE
Q6H PRN JOYCE Farrell   4 mg at 08/12/20 1510    morphine (PF) injection 2 mg  2 mg Intravenous Q2H PRN Lauren Foster PA-C   2 mg at 08/12/20 1511    Or    morphine sulfate (PF) injection 4 mg  4 mg Intravenous Q2H PRN Lauren Foster PA-C           Allergies:     Allergies   Allergen Reactions    Contrast [Iodides]        Problem List:    Patient Active Problem List   Diagnosis Code    Chest pain R07.9    Congenital abnormality Q89.9    Anemia D64.9    Splenomegaly R16.1    Abdominal pain, left upper quadrant R10.12    Other neutropenia (HCC) D70.8    Neutropenia (HCC) D70.9    Cellulitis of leg, right L03.115    Morbidly obese (HCC) E66.01       Past Medical History:        Diagnosis Date    Asthma     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    Enlarged heart        Past Surgical History:        Procedure Laterality Date    TYMPANOSTOMY TUBE PLACEMENT         Social History:    Social History     Tobacco Use    Smoking status: Never Smoker    Smokeless tobacco: Current User     Types: Chew   Substance Use Topics    Alcohol use: Yes     Comment: occassionally                                Ready to quit: Not Answered  Counseling given: Not Answered      Vital Signs (Current):   Vitals:    08/12/20 1500 08/12/20 2007 08/13/20 0141 08/13/20 0830   BP: (!) 159/72 131/72 115/66 (!) 143/75   Pulse:  93 87 88   Resp:  18 18 16   Temp:  97.5 °F (36.4 °C) 97.6 °F (36.4 °C) 98.3 °F (36.8 °C)   TempSrc:  Oral Oral    SpO2: 97% 97% 99% 95%   Weight:       Height:                                                  BP Readings from Last 3 Encounters:   08/13/20 (!) 143/75   05/16/20 113/60   05/16/19 (!) 129/90       NPO Status:                                                                                 BMI:   Wt Readings from Last 3 Encounters:   08/12/20 (!) 440 lb (199.6 kg)   05/16/20 (!) 442 lb (200.5 kg)   05/16/19 (!) 382 lb (173.3 kg) Plan      general     ASA 3     (I discussed with the patient the risks and benefits of PIV, general anesthesia, IV Narcotics, PACU. All questions were answered the patient agrees with the plan and wishes to proceed.  )  Induction: intravenous. Conclusions      Summary   Normal left ventricle systolic function with an estimated ejection fraction   of 55%. No regional wall motion abnormalities are seen. Normal left ventricular diastolic filling pressure. No significant valvular heart disease noted. Signature      ------------------------------------------------------------------   Electronically signed by Forde Hashimoto, MD (Interpreting   physician) on 05/16/2020 at 12:35 PM    Pre-Operative Diagnosis: Anemia [D64.9]    31 y.o.   BMI:  Body mass index is 50.85 kg/m².      Vitals:    08/13/20 0830 08/13/20 1415 08/13/20 1916 08/14/20 0337   BP: (!) 143/75 137/70 139/76 117/66   Pulse: 88 93 112 83   Resp: 16 14 16 16   Temp: 98.3 °F (36.8 °C) 97.5 °F (36.4 °C) 99.1 °F (37.3 °C) 97.4 °F (36.3 °C)   TempSrc:  Oral Oral Oral   SpO2: 95% 93% 99% 97%   Weight:       Height:           Allergies   Allergen Reactions    Contrast [Iodides]        Social History     Tobacco Use    Smoking status: Never Smoker    Smokeless tobacco: Current User     Types: Chew   Substance Use Topics    Alcohol use: Yes     Comment: occassionally       LABS:    CBC  Lab Results   Component Value Date/Time    WBC 1.4 (LL) 08/13/2020 05:35 AM    HGB 9.5 (L) 08/14/2020 05:57 AM    HCT 29.3 (L) 08/14/2020 05:57 AM     08/13/2020 05:35 AM     RENAL  Lab Results   Component Value Date/Time     (L) 08/13/2020 05:35 AM    K 4.7 08/13/2020 05:35 AM     08/13/2020 05:35 AM    CO2 28 08/13/2020 05:35 AM    BUN 14 08/13/2020 05:35 AM    CREATININE 0.7 (L) 08/13/2020 05:35 AM    GLUCOSE 123 (H) 08/13/2020 05:35 AM     COAGS  Lab Results   Component Value Date/Time    PROTIME 12.5 08/12/2020 02:59 AM    INR 1.08 08/12/2020 02:59 AM    APTT 29.5 08/12/2020 02:59 AM       Francie Gallegos MD   8/13/2020

## 2020-08-13 NOTE — PROGRESS NOTES
Meds:  sodium chloride flush, acetaminophen **OR** acetaminophen, polyethylene glycol, promethazine **OR** ondansetron, morphine **OR** morphine      Data:  CBC:   Recent Labs     08/12/20  0259 08/12/20  0615  08/12/20  1340 08/12/20  2208 08/13/20  0535   WBC 1.6* 1.7*  --   --   --  1.4*   HGB 8.5* 7.9*  --  8.4* 8.1* 8.2*   HCT 25.8* 24.0*   < > 25.5* 24.9* 24.8*   MCV 89.3 87.7  --   --   --  89.1    194  --   --   --  180    < > = values in this interval not displayed. BMP:   Recent Labs     08/12/20  0259 08/13/20  0535   * 135*   K 4.1 4.7   CL 99 100   CO2 23 28   BUN 18 14   CREATININE 0.7* 0.7*     LIVER PROFILE:   Recent Labs     08/12/20  0259   AST 48*   ALT 43*   LIPASE 16.0   BILITOT 0.9   ALKPHOS 66     PT/INR:   Recent Labs     08/12/20  0259   PROTIME 12.5   INR 1.08   Results for Santi Dewey (MRN [id number]) as of 8/13/2020 08:11   Ref. Range 8/12/2020 07:20   ABO Rh Unknown A POS   Antibody Screen Unknown    Polyspecific Jluis Unknown POS   Direct antiglobulin test, IgG Unknown POS       Results for Santi Dewey (MRN [id number]) as of 8/13/2020 08:11   Ref. Range 8/12/2020 08:19   Ferritin Latest Ref Range: 30.0 - 400.0 ng/mL 520.1 (H)   Iron Latest Ref Range: 59 - 158 ug/dL 35 (L)   Iron Saturation Latest Ref Range: 20 - 50 % 12 (L)   TIBC Latest Ref Range: 260 - 445 ug/dL 303   Folate Latest Ref Range: 4.78 - 24.20 ng/mL 6.22   Vitamin B-12 Latest Ref Range: 211 - 911 pg/mL 625       Assessment:  Principal Problem:    Anemia  Active Problems:    Splenomegaly    LUQ abdominal pain    Other neutropenia (HCC)    Neutropenia (HCC)    Cellulitis of leg, right  Resolved Problems:    * No resolved hospital problems. *      Plan:    #Acute anemia-I suspect that he has hemolytic anemia. He is Jluis test positive. - Hb 10.7 per hem/onc notes on 8/11.   Hb 8.5-> 7.9 since arrival to ER today   - Denies any GI bleeding, check hemoccult   - iron studies, hemolytic panel, B12,

## 2020-08-13 NOTE — PROGRESS NOTES
Report given @ bedside to [facility], for transferral of care. Pt resting in bed. Call light in reach.

## 2020-08-13 NOTE — PROGRESS NOTES
Pt resting quietly in bed. resp e/e. No complaints this shift. Blood still not avail at this time. Call light in reach.  Lucia De La Rosa

## 2020-08-13 NOTE — PROGRESS NOTES
Pt awake in bed. PM assessment complete. Stephie meds given. Pt c/o pain, offered pain meds & pt declines. States \"it just takes the edge off & makes me sleepy, I don't like to take it\". States if he changes his mind he will call. No needs voiced. Call light in reach. Will cont to monitor.  Amelia Davila

## 2020-08-13 NOTE — CONSULTS
Gastroenterology Consult Note    Patient:   Jennifer Steiner II   :    1990   Facility:   [address]  Referring/PCP: Itzel Keith  Date:     2020  Consultant:   Tereso Becerra PA-C      Chief Complaint   Patient presents with    Abdominal Pain     pt reports has had enlarged spleen for few years. saw herms today. was told needed removed. reports stood up off couch and developed pain in luq and left shoulder. pt pale cool dry. ems started iv left ac. fentanyl 50mcg given with no relief        History of Present illness   31year old male with a history of asthma, congenital abnormality of IVC, and enlarged heart presented to the ED with LUQ abdominal pain. He states the pain started on Tuesday. It started as a dull ache and gradually worsened to sharp and persistent. He states it became hard to swallow liquids and solids. He has seen Dr. Osman Arenas in the past and workup was inconclusive. He admits to heartburn and bloating. He takes GasX daily to manage these symptoms. He denies nausea, vomiting, cramping, rectal bleeding, melena, weight loss, and change in bowel habits. He admits to taking Advil 800 mg every 5-6 hours daily, dip tobacco 1-2 cans daily, and drinking 15 servings of alcohol per month. He has never had an EGD or colonoscopy. Upon admission, his CT showed splenomegaly and Liver/Spleen scan showed borderline colloid shift. GI was consulted to evaluate hepatomegaly and rule out liver disease prior to splenectomy.      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 No pertinent family history. No current facility-administered medications on file prior to encounter. Current Outpatient Medications on File Prior to Encounter   Medication Sig Dispense Refill    clindamycin (CLEOCIN) 300 MG capsule Take 300 mg by mouth 4 times daily      PROAIR  (90 Base) MCG/ACT inhaler Take 108 mcg by mouth as needed      ibuprofen (IBU) 800 MG tablet Take 1 tablet by mouth every 8 hours as needed for Pain 20 tablet 0    Acetaminophen (TYLENOL EXTRA STRENGTH PO) Take 1,000 mg by mouth daily as needed        Infusions:   PRN Medications: sodium chloride flush, acetaminophen **OR** acetaminophen, polyethylene glycol, promethazine **OR** ondansetron, morphine **OR** morphine  Allergies: Allergies   Allergen Reactions    Contrast [Iodides]        ROS:   Constitutional: negative for chills, fevers and sweats  Eyes: negative for cataracts, icterus and redness  Ears, nose, mouth, throat, and face: negative for epistaxis, hearing loss and sore throat  Respiratory: negative for cough, hemoptysis and sputum  Cardiovascular: negative for chest pain, dyspnea and lower extremity edema  Gastrointestinal: as per HPI  Genitourinary:negative for dysuria, frequency and hematuria  Neurological: negative for coordination problems, dizziness and gait problems  Behavioral/Psych: negative for anxiety, depression and mood swings    Physical Exam   BP (!) 143/75   Pulse 88   Temp 98.3 °F (36.8 °C)   Resp 16   Ht 6' 6\" (1.981 m)   Wt (!) 440 lb (199.6 kg)   SpO2 95%   BMI 50.85 kg/m²     General appearance: alert, appears stated age, cooperative, no distress and morbidly obese  Head: Normocephalic, without obvious abnormality, atraumatic  Eyes: conjunctivae/corneas clear. PERRL, EOM's intact. Fundi benign.   Neck: no adenopathy and supple, symmetrical, trachea midline  Lungs: clear to auscultation bilaterally  Heart: regular rate and rhythm, S1, S2 normal, no murmur, click, rub or gallop  Abdomen: normal findings: bowel sounds normal, no masses palpable and symmetric and abnormal findings:  obese and tenderness mild in the epigastrium and in the LUQ  Extremities: extremities normal, atraumatic, no cyanosis or edema    Lab and Imaging Review   Labs:  CBC:   Recent Labs     08/12/20  0259 08/12/20  0615  08/12/20  1340 08/12/20  2208 08/13/20  0535   WBC 1.6* 1.7*  --   --   --  1.4*   HGB 8.5* 7.9*  --  8.4* 8.1* 8.2*   HCT 25.8* 24.0*   < > 25.5* 24.9* 24.8*   MCV 89.3 87.7  --   --   --  89.1    194  --   --   --  180    < > = values in this interval not displayed. BMP:   Recent Labs     08/12/20  0259 08/13/20  0535   * 135*   K 4.1 4.7   CL 99 100   CO2 23 28   BUN 18 14   CREATININE 0.7* 0.7*     LIVER PROFILE:   Recent Labs     08/12/20 0259   AST 48*   ALT 43*   LIPASE 16.0   PROT 7.5   BILITOT 0.9   ALKPHOS 66     PT/INR:   Recent Labs     08/12/20 0259   INR 1.08       IMAGING:  CT ABDOMEN PELVIS WO CONTRAST   Impression    1. Chronic pattern of splenomegaly with no focal abnormality. 2. There are no other significant findings in the abdomen or pelvis. NM LIVER SPLEEN SCAN   Impression    Hepatosplenomegaly.         Borderline colloid shift. Attending Supervising [de-identified] Attestation Statement  The patient is a 31 y.o. male. I have performed a history and physical examination of the patient. I discussed the case with my physician assistant Rayne Martinez PA-C    I reviewed the patient's Past Medical History, Past Surgical History, Medications, and Allergies.      Physical Exam:  Vitals:    08/12/20 2007 08/13/20 0141 08/13/20 0830 08/13/20 1415   BP: 131/72 115/66 (!) 143/75 137/70   Pulse: 93 87 88 93   Resp: 18 18 16 14   Temp: 97.5 °F (36.4 °C) 97.6 °F (36.4 °C) 98.3 °F (36.8 °C) 97.5 °F (36.4 °C)   TempSrc: Oral Oral  Oral   SpO2: 97% 99% 95% 93%   Weight:       Height:           Physical Examination: General appearance - alert, well appearing, and in no distress  Mental status - alert, oriented to person, place, and time  Eyes - pupils equal and reactive, extraocular eye movements intact  Neck - supple, no significant adenopathy  Chest - clear to auscultation, no wheezes, rales or rhonchi, symmetric air entry  Heart - normal rate, regular rhythm, normal S1, S2, no murmurs, rubs, clicks or gallops  Abdomen - soft, nontender, nondistended, no masses or organomegaly  Extremities - pedal edema 2 +              Assessment:   31year old male with a history of asthma, congenital abnormality of IVC, and enlarged heart admitted with hypersplenism with chronic leukopenia and anemia, intractable LUQ pain, and morbid obesity. Iron studies show elevated ferritin, decreased iron, and decreased iron saturation. ALT and AST are mildly elevated. Platelets are normal.      Plan:   1. Continue supportive care  2. Monitor and document output  3. Check liver serologies including acute viral hepatitis panel  4. Continue PPI qAM  5. Surgery planning splenectomy for tomorrow  6. Consider liver biopsy possibly during splenectomy  7. Up in chair TID  8. Ambulate TID  9. Will follow    Idalmis Becerra PA-C  11:53 AM 8/13/2020                      31year old male with a history of asthma, congenital abnormality of IVC, and chronic leukopenia admitted with LUQ pain secondary to splenomegaly. Liver spleen scan showed abnormal colloid shift. CT also suggested hepatomegaly raising possibility of liver disease. Will check liver serologies. Suspect non-alcoholic fatty liver disease.  Will ask surgeon to obtain liver core biopsy at the time of splenectomy    Sarah Starkey MD          [phone]  [phone]

## 2020-08-14 ENCOUNTER — ANESTHESIA (OUTPATIENT)
Dept: OPERATING ROOM | Age: 30
DRG: 800 | End: 2020-08-14
Payer: COMMERCIAL

## 2020-08-14 VITALS — TEMPERATURE: 97.9 F | OXYGEN SATURATION: 85 % | SYSTOLIC BLOOD PRESSURE: 166 MMHG | DIASTOLIC BLOOD PRESSURE: 79 MMHG

## 2020-08-14 LAB
ANTI-NUCLEAR ANTIBODY (ANA): NEGATIVE
HCT VFR BLD CALC: 28.9 % (ref 40.5–52.5)
HCT VFR BLD CALC: 29.3 % (ref 40.5–52.5)
HCT VFR BLD CALC: 32.3 % (ref 40.5–52.5)
HEMOGLOBIN: 10.2 G/DL (ref 13.5–17.5)
HEMOGLOBIN: 9.5 G/DL (ref 13.5–17.5)
IMMATURE RETIC FRACT: 0.69 (ref 0.21–0.37)
RETICULOCYTE ABSOLUTE COUNT: 0.18 M/UL
RETICULOCYTE COUNT PCT: 5.77 % (ref 0.5–2.18)

## 2020-08-14 PROCEDURE — 2500000003 HC RX 250 WO HCPCS: Performed by: SURGERY

## 2020-08-14 PROCEDURE — 2709999900 HC NON-CHARGEABLE SUPPLY: Performed by: SURGERY

## 2020-08-14 PROCEDURE — C1892 INTRO/SHEATH,FIXED,PEEL-AWAY: HCPCS | Performed by: SURGERY

## 2020-08-14 PROCEDURE — 85014 HEMATOCRIT: CPT

## 2020-08-14 PROCEDURE — 6360000002 HC RX W HCPCS: Performed by: SURGERY

## 2020-08-14 PROCEDURE — 88313 SPECIAL STAINS GROUP 2: CPT

## 2020-08-14 PROCEDURE — 38101 REMOVAL OF SPLEEN PARTIAL: CPT | Performed by: SURGERY

## 2020-08-14 PROCEDURE — 85018 HEMOGLOBIN: CPT

## 2020-08-14 PROCEDURE — 0FB00ZX EXCISION OF LIVER, OPEN APPROACH, DIAGNOSTIC: ICD-10-PCS | Performed by: SURGERY

## 2020-08-14 PROCEDURE — 3700000000 HC ANESTHESIA ATTENDED CARE: Performed by: SURGERY

## 2020-08-14 PROCEDURE — 6360000002 HC RX W HCPCS

## 2020-08-14 PROCEDURE — C2626 INFUSION PUMP, NON-PROG,TEMP: HCPCS | Performed by: SURGERY

## 2020-08-14 PROCEDURE — 2500000003 HC RX 250 WO HCPCS: Performed by: NURSE ANESTHETIST, CERTIFIED REGISTERED

## 2020-08-14 PROCEDURE — 1200000000 HC SEMI PRIVATE

## 2020-08-14 PROCEDURE — 6360000002 HC RX W HCPCS: Performed by: NURSE ANESTHETIST, CERTIFIED REGISTERED

## 2020-08-14 PROCEDURE — 36415 COLL VENOUS BLD VENIPUNCTURE: CPT

## 2020-08-14 PROCEDURE — 7100000000 HC PACU RECOVERY - FIRST 15 MIN: Performed by: SURGERY

## 2020-08-14 PROCEDURE — 3600000004 HC SURGERY LEVEL 4 BASE: Performed by: SURGERY

## 2020-08-14 PROCEDURE — 47001 NDL BIOPSY LVR TM OTH MAJ PX: CPT | Performed by: SURGERY

## 2020-08-14 PROCEDURE — 7100000001 HC PACU RECOVERY - ADDTL 15 MIN: Performed by: SURGERY

## 2020-08-14 PROCEDURE — 3600000014 HC SURGERY LEVEL 4 ADDTL 15MIN: Performed by: SURGERY

## 2020-08-14 PROCEDURE — 2580000003 HC RX 258: Performed by: NURSE ANESTHETIST, CERTIFIED REGISTERED

## 2020-08-14 PROCEDURE — 2580000003 HC RX 258: Performed by: SURGERY

## 2020-08-14 PROCEDURE — 07TP0ZZ RESECTION OF SPLEEN, OPEN APPROACH: ICD-10-PCS | Performed by: SURGERY

## 2020-08-14 PROCEDURE — 2720000010 HC SURG SUPPLY STERILE: Performed by: SURGERY

## 2020-08-14 PROCEDURE — 99232 SBSQ HOSP IP/OBS MODERATE 35: CPT | Performed by: INTERNAL MEDICINE

## 2020-08-14 PROCEDURE — 88342 IMHCHEM/IMCYTCHM 1ST ANTB: CPT

## 2020-08-14 PROCEDURE — 6360000002 HC RX W HCPCS: Performed by: ANESTHESIOLOGY

## 2020-08-14 PROCEDURE — 6360000002 HC RX W HCPCS: Performed by: NURSE PRACTITIONER

## 2020-08-14 PROCEDURE — 88307 TISSUE EXAM BY PATHOLOGIST: CPT

## 2020-08-14 PROCEDURE — 88305 TISSUE EXAM BY PATHOLOGIST: CPT

## 2020-08-14 PROCEDURE — 6370000000 HC RX 637 (ALT 250 FOR IP): Performed by: INTERNAL MEDICINE

## 2020-08-14 PROCEDURE — 6370000000 HC RX 637 (ALT 250 FOR IP): Performed by: SURGERY

## 2020-08-14 PROCEDURE — 88341 IMHCHEM/IMCYTCHM EA ADD ANTB: CPT

## 2020-08-14 PROCEDURE — 3700000001 HC ADD 15 MINUTES (ANESTHESIA): Performed by: SURGERY

## 2020-08-14 RX ORDER — MORPHINE SULFATE 10 MG/ML
2 INJECTION, SOLUTION INTRAMUSCULAR; INTRAVENOUS EVERY 5 MIN PRN
Status: DISCONTINUED | OUTPATIENT
Start: 2020-08-14 | End: 2020-08-14

## 2020-08-14 RX ORDER — MIDAZOLAM HYDROCHLORIDE 1 MG/ML
INJECTION INTRAMUSCULAR; INTRAVENOUS PRN
Status: DISCONTINUED | OUTPATIENT
Start: 2020-08-14 | End: 2020-08-14 | Stop reason: SDUPTHER

## 2020-08-14 RX ORDER — PROPOFOL 10 MG/ML
INJECTION, EMULSION INTRAVENOUS PRN
Status: DISCONTINUED | OUTPATIENT
Start: 2020-08-14 | End: 2020-08-14 | Stop reason: SDUPTHER

## 2020-08-14 RX ORDER — OXYCODONE HYDROCHLORIDE AND ACETAMINOPHEN 5; 325 MG/1; MG/1
1 TABLET ORAL PRN
Status: DISCONTINUED | OUTPATIENT
Start: 2020-08-14 | End: 2020-08-14

## 2020-08-14 RX ORDER — DEXAMETHASONE SODIUM PHOSPHATE 4 MG/ML
INJECTION, SOLUTION INTRA-ARTICULAR; INTRALESIONAL; INTRAMUSCULAR; INTRAVENOUS; SOFT TISSUE PRN
Status: DISCONTINUED | OUTPATIENT
Start: 2020-08-14 | End: 2020-08-14 | Stop reason: SDUPTHER

## 2020-08-14 RX ORDER — SODIUM CHLORIDE 9 MG/ML
INJECTION, SOLUTION INTRAVENOUS CONTINUOUS
Status: DISCONTINUED | OUTPATIENT
Start: 2020-08-14 | End: 2020-08-16

## 2020-08-14 RX ORDER — SODIUM CHLORIDE, SODIUM LACTATE, POTASSIUM CHLORIDE, CALCIUM CHLORIDE 600; 310; 30; 20 MG/100ML; MG/100ML; MG/100ML; MG/100ML
INJECTION, SOLUTION INTRAVENOUS
Status: DISPENSED
Start: 2020-08-14 | End: 2020-08-14

## 2020-08-14 RX ORDER — PROMETHAZINE HYDROCHLORIDE 25 MG/ML
6.25 INJECTION, SOLUTION INTRAMUSCULAR; INTRAVENOUS
Status: DISCONTINUED | OUTPATIENT
Start: 2020-08-14 | End: 2020-08-14

## 2020-08-14 RX ORDER — HYDRALAZINE HYDROCHLORIDE 20 MG/ML
5 INJECTION INTRAMUSCULAR; INTRAVENOUS EVERY 10 MIN PRN
Status: DISCONTINUED | OUTPATIENT
Start: 2020-08-14 | End: 2020-08-14

## 2020-08-14 RX ORDER — MORPHINE SULFATE 10 MG/ML
1 INJECTION, SOLUTION INTRAMUSCULAR; INTRAVENOUS EVERY 5 MIN PRN
Status: DISCONTINUED | OUTPATIENT
Start: 2020-08-14 | End: 2020-08-14

## 2020-08-14 RX ORDER — LABETALOL HYDROCHLORIDE 5 MG/ML
5 INJECTION, SOLUTION INTRAVENOUS EVERY 10 MIN PRN
Status: DISCONTINUED | OUTPATIENT
Start: 2020-08-14 | End: 2020-08-14

## 2020-08-14 RX ORDER — KETOROLAC TROMETHAMINE 30 MG/ML
30 INJECTION, SOLUTION INTRAMUSCULAR; INTRAVENOUS EVERY 8 HOURS PRN
Status: DISCONTINUED | OUTPATIENT
Start: 2020-08-14 | End: 2020-08-18 | Stop reason: HOSPADM

## 2020-08-14 RX ORDER — CEFAZOLIN SODIUM 2 G/50ML
SOLUTION INTRAVENOUS PRN
Status: DISCONTINUED | OUTPATIENT
Start: 2020-08-14 | End: 2020-08-14 | Stop reason: SDUPTHER

## 2020-08-14 RX ORDER — FENTANYL CITRATE 50 UG/ML
INJECTION, SOLUTION INTRAMUSCULAR; INTRAVENOUS PRN
Status: DISCONTINUED | OUTPATIENT
Start: 2020-08-14 | End: 2020-08-14 | Stop reason: SDUPTHER

## 2020-08-14 RX ORDER — LIDOCAINE HYDROCHLORIDE 20 MG/ML
INJECTION, SOLUTION EPIDURAL; INFILTRATION; INTRACAUDAL; PERINEURAL PRN
Status: DISCONTINUED | OUTPATIENT
Start: 2020-08-14 | End: 2020-08-14 | Stop reason: SDUPTHER

## 2020-08-14 RX ORDER — KETOROLAC TROMETHAMINE 30 MG/ML
INJECTION, SOLUTION INTRAMUSCULAR; INTRAVENOUS
Status: COMPLETED
Start: 2020-08-14 | End: 2020-08-14

## 2020-08-14 RX ORDER — SODIUM CHLORIDE, SODIUM LACTATE, POTASSIUM CHLORIDE, CALCIUM CHLORIDE 600; 310; 30; 20 MG/100ML; MG/100ML; MG/100ML; MG/100ML
INJECTION, SOLUTION INTRAVENOUS CONTINUOUS PRN
Status: DISCONTINUED | OUTPATIENT
Start: 2020-08-14 | End: 2020-08-14 | Stop reason: SDUPTHER

## 2020-08-14 RX ORDER — KETOROLAC TROMETHAMINE 30 MG/ML
30 INJECTION, SOLUTION INTRAMUSCULAR; INTRAVENOUS ONCE
Status: COMPLETED | OUTPATIENT
Start: 2020-08-14 | End: 2020-08-14

## 2020-08-14 RX ORDER — ROCURONIUM BROMIDE 10 MG/ML
INJECTION, SOLUTION INTRAVENOUS PRN
Status: DISCONTINUED | OUTPATIENT
Start: 2020-08-14 | End: 2020-08-14 | Stop reason: SDUPTHER

## 2020-08-14 RX ORDER — PREDNISONE 20 MG/1
40 TABLET ORAL 2 TIMES DAILY
Status: DISCONTINUED | OUTPATIENT
Start: 2020-08-14 | End: 2020-08-17

## 2020-08-14 RX ORDER — BUPIVACAINE HYDROCHLORIDE 5 MG/ML
INJECTION, SOLUTION PERINEURAL PRN
Status: DISCONTINUED | OUTPATIENT
Start: 2020-08-14 | End: 2020-08-14 | Stop reason: ALTCHOICE

## 2020-08-14 RX ORDER — DIPHENHYDRAMINE HYDROCHLORIDE 50 MG/ML
12.5 INJECTION INTRAMUSCULAR; INTRAVENOUS
Status: DISCONTINUED | OUTPATIENT
Start: 2020-08-14 | End: 2020-08-14

## 2020-08-14 RX ORDER — SUCCINYLCHOLINE CHLORIDE 20 MG/ML
INJECTION INTRAMUSCULAR; INTRAVENOUS PRN
Status: DISCONTINUED | OUTPATIENT
Start: 2020-08-14 | End: 2020-08-14 | Stop reason: SDUPTHER

## 2020-08-14 RX ORDER — OXYCODONE HYDROCHLORIDE AND ACETAMINOPHEN 5; 325 MG/1; MG/1
2 TABLET ORAL PRN
Status: DISCONTINUED | OUTPATIENT
Start: 2020-08-14 | End: 2020-08-14

## 2020-08-14 RX ORDER — ONDANSETRON 2 MG/ML
INJECTION INTRAMUSCULAR; INTRAVENOUS PRN
Status: DISCONTINUED | OUTPATIENT
Start: 2020-08-14 | End: 2020-08-14 | Stop reason: SDUPTHER

## 2020-08-14 RX ORDER — ONDANSETRON 2 MG/ML
4 INJECTION INTRAMUSCULAR; INTRAVENOUS PRN
Status: DISCONTINUED | OUTPATIENT
Start: 2020-08-14 | End: 2020-08-14

## 2020-08-14 RX ORDER — MEPERIDINE HYDROCHLORIDE 25 MG/ML
12.5 INJECTION INTRAMUSCULAR; INTRAVENOUS; SUBCUTANEOUS EVERY 5 MIN PRN
Status: DISCONTINUED | OUTPATIENT
Start: 2020-08-14 | End: 2020-08-14

## 2020-08-14 RX ADMIN — DEXAMETHASONE SODIUM PHOSPHATE 8 MG: 4 INJECTION, SOLUTION INTRAMUSCULAR; INTRAVENOUS at 08:31

## 2020-08-14 RX ADMIN — HYDROMORPHONE HYDROCHLORIDE 1 MG: 1 INJECTION, SOLUTION INTRAMUSCULAR; INTRAVENOUS; SUBCUTANEOUS at 11:59

## 2020-08-14 RX ADMIN — CEFAZOLIN SODIUM 3 G: 2 SOLUTION INTRAVENOUS at 07:32

## 2020-08-14 RX ADMIN — SUCCINYLCHOLINE CHLORIDE 160 MG: 20 INJECTION, SOLUTION INTRAMUSCULAR; INTRAVENOUS at 07:38

## 2020-08-14 RX ADMIN — FENTANYL CITRATE 100 MCG: 50 INJECTION, SOLUTION INTRAMUSCULAR; INTRAVENOUS at 07:52

## 2020-08-14 RX ADMIN — KETOROLAC TROMETHAMINE 30 MG: 30 INJECTION, SOLUTION INTRAMUSCULAR at 09:03

## 2020-08-14 RX ADMIN — PREDNISONE 40 MG: 20 TABLET ORAL at 20:47

## 2020-08-14 RX ADMIN — KETOROLAC TROMETHAMINE 30 MG: 30 INJECTION, SOLUTION INTRAMUSCULAR; INTRAVENOUS at 09:03

## 2020-08-14 RX ADMIN — FENTANYL CITRATE 100 MCG: 50 INJECTION, SOLUTION INTRAMUSCULAR; INTRAVENOUS at 07:38

## 2020-08-14 RX ADMIN — HYDROMORPHONE HYDROCHLORIDE 1 MG: 1 INJECTION, SOLUTION INTRAMUSCULAR; INTRAVENOUS; SUBCUTANEOUS at 15:17

## 2020-08-14 RX ADMIN — Medication 3 G: at 15:45

## 2020-08-14 RX ADMIN — FENTANYL CITRATE 50 MCG: 50 INJECTION, SOLUTION INTRAMUSCULAR; INTRAVENOUS at 08:31

## 2020-08-14 RX ADMIN — SODIUM CHLORIDE, POTASSIUM CHLORIDE, SODIUM LACTATE AND CALCIUM CHLORIDE: 600; 310; 30; 20 INJECTION, SOLUTION INTRAVENOUS at 08:19

## 2020-08-14 RX ADMIN — HYDROMORPHONE HYDROCHLORIDE 1 MG: 1 INJECTION, SOLUTION INTRAMUSCULAR; INTRAVENOUS; SUBCUTANEOUS at 22:13

## 2020-08-14 RX ADMIN — PROMETHAZINE HYDROCHLORIDE 6.25 MG: 25 INJECTION INTRAMUSCULAR; INTRAVENOUS at 09:25

## 2020-08-14 RX ADMIN — Medication 3 G: at 07:27

## 2020-08-14 RX ADMIN — ONDANSETRON 4 MG: 2 INJECTION, SOLUTION INTRAMUSCULAR; INTRAVENOUS at 08:31

## 2020-08-14 RX ADMIN — PROPOFOL 300 MG: 10 INJECTION, EMULSION INTRAVENOUS at 07:38

## 2020-08-14 RX ADMIN — BUPIVACAINE HYDROCHLORIDE 270 ML: 5 INJECTION, SOLUTION EPIDURAL; INTRACAUDAL at 08:48

## 2020-08-14 RX ADMIN — KETOROLAC TROMETHAMINE 30 MG: 30 INJECTION, SOLUTION INTRAMUSCULAR at 20:47

## 2020-08-14 RX ADMIN — Medication 3 G: at 23:45

## 2020-08-14 RX ADMIN — HYDROMORPHONE HYDROCHLORIDE 0.5 MG: 1 INJECTION, SOLUTION INTRAMUSCULAR; INTRAVENOUS; SUBCUTANEOUS at 09:18

## 2020-08-14 RX ADMIN — SUGAMMADEX 200 MG: 100 INJECTION, SOLUTION INTRAVENOUS at 08:36

## 2020-08-14 RX ADMIN — PREDNISONE 50 MG: 20 TABLET ORAL at 11:02

## 2020-08-14 RX ADMIN — Medication 10 ML: at 11:02

## 2020-08-14 RX ADMIN — FENTANYL CITRATE 50 MCG: 50 INJECTION, SOLUTION INTRAMUSCULAR; INTRAVENOUS at 08:53

## 2020-08-14 RX ADMIN — KETOROLAC TROMETHAMINE 30 MG: 30 INJECTION, SOLUTION INTRAMUSCULAR at 11:02

## 2020-08-14 RX ADMIN — ONDANSETRON 4 MG: 2 INJECTION INTRAMUSCULAR; INTRAVENOUS at 11:59

## 2020-08-14 RX ADMIN — FENTANYL CITRATE 50 MCG: 50 INJECTION, SOLUTION INTRAMUSCULAR; INTRAVENOUS at 08:37

## 2020-08-14 RX ADMIN — SODIUM CHLORIDE: 9 INJECTION, SOLUTION INTRAVENOUS at 10:28

## 2020-08-14 RX ADMIN — ROCURONIUM BROMIDE 40 MG: 10 INJECTION, SOLUTION INTRAVENOUS at 08:12

## 2020-08-14 RX ADMIN — ROCURONIUM BROMIDE 5 MG: 10 INJECTION, SOLUTION INTRAVENOUS at 07:38

## 2020-08-14 RX ADMIN — FENTANYL CITRATE 50 MCG: 50 INJECTION, SOLUTION INTRAMUSCULAR; INTRAVENOUS at 08:50

## 2020-08-14 RX ADMIN — SODIUM CHLORIDE: 9 INJECTION, SOLUTION INTRAVENOUS at 20:47

## 2020-08-14 RX ADMIN — SODIUM CHLORIDE, POTASSIUM CHLORIDE, SODIUM LACTATE AND CALCIUM CHLORIDE: 600; 310; 30; 20 INJECTION, SOLUTION INTRAVENOUS at 06:56

## 2020-08-14 RX ADMIN — ROCURONIUM BROMIDE 80 MG: 10 INJECTION, SOLUTION INTRAVENOUS at 07:47

## 2020-08-14 RX ADMIN — Medication 10 ML: at 20:47

## 2020-08-14 RX ADMIN — MIDAZOLAM 2 MG: 1 INJECTION INTRAMUSCULAR; INTRAVENOUS at 07:32

## 2020-08-14 RX ADMIN — HYDROMORPHONE HYDROCHLORIDE 0.5 MG: 1 INJECTION, SOLUTION INTRAMUSCULAR; INTRAVENOUS; SUBCUTANEOUS at 09:02

## 2020-08-14 RX ADMIN — LIDOCAINE HYDROCHLORIDE 50 MG: 20 INJECTION, SOLUTION EPIDURAL; INFILTRATION; INTRACAUDAL; PERINEURAL at 07:38

## 2020-08-14 RX ADMIN — HYDROMORPHONE HYDROCHLORIDE 1 MG: 1 INJECTION, SOLUTION INTRAMUSCULAR; INTRAVENOUS; SUBCUTANEOUS at 19:03

## 2020-08-14 ASSESSMENT — PULMONARY FUNCTION TESTS
PIF_VALUE: 0
PIF_VALUE: 28
PIF_VALUE: 28
PIF_VALUE: 31
PIF_VALUE: 29
PIF_VALUE: 29
PIF_VALUE: 32
PIF_VALUE: 31
PIF_VALUE: 28
PIF_VALUE: 0
PIF_VALUE: 26
PIF_VALUE: 30
PIF_VALUE: 28
PIF_VALUE: 26
PIF_VALUE: 28
PIF_VALUE: 29
PIF_VALUE: 36
PIF_VALUE: 31
PIF_VALUE: 28
PIF_VALUE: 3
PIF_VALUE: 1
PIF_VALUE: 13
PIF_VALUE: 29
PIF_VALUE: 27
PIF_VALUE: 29
PIF_VALUE: 35
PIF_VALUE: 0
PIF_VALUE: 29
PIF_VALUE: 3
PIF_VALUE: 28
PIF_VALUE: 25
PIF_VALUE: 25
PIF_VALUE: 29
PIF_VALUE: 3
PIF_VALUE: 27
PIF_VALUE: 29
PIF_VALUE: 1
PIF_VALUE: 27
PIF_VALUE: 26
PIF_VALUE: 32
PIF_VALUE: 34
PIF_VALUE: 17
PIF_VALUE: 10
PIF_VALUE: 30
PIF_VALUE: 29
PIF_VALUE: 28
PIF_VALUE: 32
PIF_VALUE: 26
PIF_VALUE: 34
PIF_VALUE: 28
PIF_VALUE: 0
PIF_VALUE: 0
PIF_VALUE: 28
PIF_VALUE: 32
PIF_VALUE: 4
PIF_VALUE: 28
PIF_VALUE: 2
PIF_VALUE: 28
PIF_VALUE: 27
PIF_VALUE: 28
PIF_VALUE: 28
PIF_VALUE: 26
PIF_VALUE: 29
PIF_VALUE: 27
PIF_VALUE: 34
PIF_VALUE: 31
PIF_VALUE: 1
PIF_VALUE: 33
PIF_VALUE: 34
PIF_VALUE: 28
PIF_VALUE: 29
PIF_VALUE: 27
PIF_VALUE: 38

## 2020-08-14 ASSESSMENT — PAIN DESCRIPTION - FREQUENCY
FREQUENCY: CONTINUOUS

## 2020-08-14 ASSESSMENT — PAIN DESCRIPTION - PROGRESSION
CLINICAL_PROGRESSION: GRADUALLY WORSENING

## 2020-08-14 ASSESSMENT — PAIN DESCRIPTION - LOCATION
LOCATION: ABDOMEN

## 2020-08-14 ASSESSMENT — PAIN DESCRIPTION - PAIN TYPE
TYPE: SURGICAL PAIN

## 2020-08-14 ASSESSMENT — PAIN SCALES - GENERAL
PAINLEVEL_OUTOF10: 5
PAINLEVEL_OUTOF10: 0
PAINLEVEL_OUTOF10: 10
PAINLEVEL_OUTOF10: 8
PAINLEVEL_OUTOF10: 0
PAINLEVEL_OUTOF10: 8
PAINLEVEL_OUTOF10: 10
PAINLEVEL_OUTOF10: 8
PAINLEVEL_OUTOF10: 5
PAINLEVEL_OUTOF10: 10
PAINLEVEL_OUTOF10: 6
PAINLEVEL_OUTOF10: 10
PAINLEVEL_OUTOF10: 5
PAINLEVEL_OUTOF10: 0
PAINLEVEL_OUTOF10: 10
PAINLEVEL_OUTOF10: 8
PAINLEVEL_OUTOF10: 0

## 2020-08-14 ASSESSMENT — PAIN DESCRIPTION - ORIENTATION
ORIENTATION: MID

## 2020-08-14 ASSESSMENT — PAIN DESCRIPTION - ONSET
ONSET: ON-GOING

## 2020-08-14 ASSESSMENT — PAIN DESCRIPTION - DESCRIPTORS
DESCRIPTORS: CONSTANT;SHARP
DESCRIPTORS: CRAMPING;SHARP
DESCRIPTORS: ACHING
DESCRIPTORS: DISCOMFORT;ACHING
DESCRIPTORS: SHARP
DESCRIPTORS: SHARP

## 2020-08-14 NOTE — PROGRESS NOTES
Returned to room via bed in stable condition. C/o abdominal pain but quickly closes eyes. Resp e/e. No distress noted. Am assessment complete. Alert and oriented but drowsy. Mother at bedside. Call light in reach. Bed alarm in place and turned on. SCD applied. Patient is able to demonstrated the ability to move from a reclining position to an upright position within the recliner.

## 2020-08-14 NOTE — PROGRESS NOTES
Patient transported to floor by RN and transport with all belongings in stable condition abdomen soft with midline dressing C/D/I J. P. with small amount of serosanguinous drainage noted in bulb pain ball taped and unclamped. Amrita Reis

## 2020-08-14 NOTE — BRIEF OP NOTE
Brief Postoperative Note      Patient: Sol Lester  YOB: 1990  MRN: [id number]    Date of Procedure: 8/14/2020    Pre-Op Diagnosis: HYPERSPLENISM    Post-Op Diagnosis: Same       Procedure(s):  SPLENECTOMY, LIVER BIOPSY    Surgeon(s):  Gladis Angel MD    Assistant:  Surgical Assistant: Julien Vázquez    Anesthesia: General    Estimated Blood Loss (mL): 277    Complications: None    Specimens:   ID Type Source Tests Collected by Time Destination   A : SPLEEN Tissue Tissue SURGICAL PATHOLOGY Gladis Angel MD 8/14/2020 5839    B : SPLEEN Tissue Tissue SURGICAL PATHOLOGY Gladis Angel MD 8/14/2020 2475        Implants:  * No implants in log *      Drains:   Closed/Suction Drain Ventral Abdomen Bulb 10 [place] (Active)       Urethral Catheter Double-lumen;Non-latex;Straight-tip 18 fr (Active)       Findings: As above    Electronically signed by Darrell Neely MD on 8/14/2020 at 8:53 AM

## 2020-08-14 NOTE — ANESTHESIA POSTPROCEDURE EVALUATION
Department of Anesthesiology  Postprocedure Note    Patient: Lucita Malik  MRN: [id number]  YOB: 1990  Date of evaluation: 8/14/2020  Time:  4:43 PM     Procedure Summary     Date:  08/14/20 Room / Location:  [place] [address] / [facility]    Anesthesia Start:  0730 Anesthesia Stop:  0007    Procedure:  SPLENECTOMY, LIVER BIOPSY (N/A ) Diagnosis:  (HYPERSPLENISM)    Surgeon:  Vicki Salazar MD Responsible Provider:  Nalini Balderas MD    Anesthesia Type:  general ASA Status:  3          Anesthesia Type: general    Ava Phase I: Ava Score: 9    Ava Phase II:      Last vitals: Reviewed and per EMR flowsheets.        Anesthesia Post Evaluation    Comments: Postoperative Anesthesia Note    Name:    Lucita Malik  MRN:      [id number]    Patient Vitals in the past 12 hrs:  08/14/20 1301, BP:(!) 155/88, Temp:97.5 °F (36.4 °C), Temp src:Oral, Pulse:85, Resp:14, SpO2:95 %  08/14/20 1200, BP:(!) 153/88, Temp:98 °F (36.7 °C), Temp src:Oral, Pulse:87, Resp:14, SpO2:96 %  08/14/20 1059, BP:(!) 165/89, Temp:97.5 °F (36.4 °C), Temp src:Oral, Pulse:91, Resp:16, SpO2:97 %  08/14/20 1001, BP:(!) 157/90, Temp:97 °F (36.1 °C), Temp src:Oral, Pulse:88, Resp:18, SpO2:99 %  08/14/20 0942, BP:(!) 182/91, Pulse:95, Resp:17, SpO2:98 %  08/14/20 0930, BP:(!) 179/91, Temp:99.4 °F (37.4 °C), Temp src:Temporal, Pulse:97, Resp:19, SpO2:98 %  08/14/20 0915, BP:(!) 178/90, Pulse:98, Resp:18, SpO2:95 %  08/14/20 0905, Temp:99.6 °F (37.6 °C), Temp src:Temporal, Pulse:99, Resp:15, SpO2:94 %  08/14/20 0900, BP:(!) 141/73, Pulse:99, Resp:15, SpO2:95 %  08/14/20 0855, Pulse:98, Resp:16, SpO2:97 %  08/14/20 0850, BP:(!) 188/97, Pulse:98, Resp:13  08/14/20 0849, BP:(!) 188/97, Temp:98.2 °F (36.8 °C), Temp src:Temporal, Pulse:101, Resp:13     LABS:    CBC  Lab Results       Component                Value               Date/Time                  WBC                      1.4 (LL)            08/13/2020 05:35 AM HGB                      10.2 (L)            08/14/2020 02:15 PM        HCT                      32.3 (L)            08/14/2020 02:15 PM        PLT                      180                 08/13/2020 05:35 AM   RENAL  Lab Results       Component                Value               Date/Time                  NA                       135 (L)             08/13/2020 05:35 AM        K                        4.7                 08/13/2020 05:35 AM        CL                       100                 08/13/2020 05:35 AM        CO2                      28                  08/13/2020 05:35 AM        BUN                      14                  08/13/2020 05:35 AM        CREATININE               0.7 (L)             08/13/2020 05:35 AM        GLUCOSE                  123 (H)             08/13/2020 05:35 AM   COAGS  Lab Results       Component                Value               Date/Time                  PROTIME                  12.5                08/12/2020 02:59 AM        INR                      1.08                08/12/2020 02:59 AM        APTT                     29.5                08/12/2020 02:59 AM     Intake & Output:  @12CNXH@    Nausea & Vomiting:  No    Level of Consciousness:  Awake    Pain Assessment:  Adequate analgesia    Anesthesia Complications:  No apparent anesthetic complications    SUMMARY      Vital signs stable  OK to discharge from Stage I post anesthesia care.   Care transferred from Anesthesiology department on discharge from perioperative area

## 2020-08-14 NOTE — PROGRESS NOTES
Progress Note    Admit Date:  8/12/2020    Subjective:  Mr. Nicole Rodriguez came to the ER with left upper quadrant pain. He has a history of chronic splenomegaly. He is morbidly obese. He also has a history of leukopenia and mild anemia. He has been evaluated by hematology in the past and has had an unremarkable bone marrow biopsy. Work-up in the emergency room showed he is anemic. There is also evidence of leukopenia and low platelets. CAT scan of the abdomen showed splenomegaly and mild hepatomegaly. Liver spleen scan was done which showed mild bilateral shift. Work-up showed that he is Jluis test positive. 8/14-GI saw him. They did not feel his splenomegaly is related to liver disease. Given large splenomegaly, splenectomy was done today. Objective:   BP (!) 165/89   Pulse 91   Temp 97.5 °F (36.4 °C) (Oral)   Resp 16   Ht 6' 6\" (1.981 m)   Wt (!) 440 lb (199.6 kg)   SpO2 97%   BMI 50.85 kg/m²          Intake/Output Summary (Last 24 hours) at 8/14/2020 1148  Last data filed at 8/14/2020 1028  Gross per 24 hour   Intake 2455 ml   Output 840 ml   Net 1615 ml       Physical Exam:    General appearance: alert, appears stated age and cooperative. He is morbidly obese  Head: Normocephalic, without obvious abnormality, atraumatic  Eyes: conjunctivae/corneas clear. PERRL, EOM's intact.   Neck: no adenopathy, no carotid bruit, no JVD, supple, symmetrical, trachea midline and thyroid not enlarged, symmetric, no tenderness/mass/nodules  Lungs: clear to auscultation bilaterally  Heart: regular rate and rhythm, S1, S2 normal, no murmur, click, rub or gallop  Abdomen: soft, non-tender; bowel sounds normal; no masses, mid line incision, s/p splenectomy  Extremities: extremities normal, atraumatic, no cyanosis or edema  Pulses: 2+ and symmetric  Skin: Skin color, texture, turgor normal. No rashes or lesions  Neurologic: Grossly normal    Scheduled Meds:   predniSONE  50 mg Oral BID    ceFAZolin  3 g Intravenous Q8H    pantoprazole  40 mg Oral QAM AC    sodium chloride flush  10 mL Intravenous 2 times per day       Continuous Infusions:   bupivacaine 0.5% 270 mL (08/14/20 0848)    sodium chloride 100 mL/hr at 08/14/20 1028    lactated ringers         PRN Meds:  bupivacaine, ketorolac, HYDROmorphone, sodium chloride flush, acetaminophen **OR** acetaminophen, polyethylene glycol, promethazine **OR** ondansetron      Data:  CBC:   Recent Labs     08/12/20  0259 08/12/20  0615  08/13/20  0535  08/13/20  1349 08/13/20  2139 08/14/20  0557   WBC 1.6* 1.7*  --  1.4*  --   --   --   --    HGB 8.5* 7.9*   < > 8.2*  --  8.8* 9.2* 9.5*   HCT 25.8* 24.0*   < > 24.8*   < > 27.2* 27.7* 29.3*   MCV 89.3 87.7  --  89.1  --   --   --   --     194  --  180  --   --   --   --     < > = values in this interval not displayed. BMP:   Recent Labs     08/12/20 0259 08/13/20  0535   * 135*   K 4.1 4.7   CL 99 100   CO2 23 28   BUN 18 14   CREATININE 0.7* 0.7*     LIVER PROFILE:   Recent Labs     08/12/20 0259   AST 48*   ALT 43*   LIPASE 16.0   BILITOT 0.9   ALKPHOS 66     PT/INR:   Recent Labs     08/12/20 0259   PROTIME 12.5   INR 1.08   Results for Yonny Badillo (MRN [id number]) as of 8/13/2020 08:11   Ref. Range 8/12/2020 07:20   ABO Rh Unknown A POS   Antibody Screen Unknown    Polyspecific Jluis Unknown POS   Direct antiglobulin test, IgG Unknown POS       Results for Yonny Badillo (MRN [id number]) as of 8/13/2020 08:11   Ref.  Range 8/12/2020 08:19   Ferritin Latest Ref Range: 30.0 - 400.0 ng/mL 520.1 (H)   Iron Latest Ref Range: 59 - 158 ug/dL 35 (L)   Iron Saturation Latest Ref Range: 20 - 50 % 12 (L)   TIBC Latest Ref Range: 260 - 445 ug/dL 303   Folate Latest Ref Range: 4.78 - 24.20 ng/mL 6.22   Vitamin B-12 Latest Ref Range: 211 - 911 pg/mL 625       Assessment:  Principal Problem:    Anemia  Active Problems:    Splenomegaly    Abdominal pain, left upper quadrant    Other neutropenia (HCC) Neutropenia ([address]    Cellulitis of leg, right    Morbidly obese (HCC)    Hypersplenism    Hepatomegaly  Resolved Problems:    * No resolved hospital problems. *      Plan:    #Acute anemia-I suspect that he has hemolytic anemia. He is Jluis test positive. - Hb 10.7 per hem/onc notes on 8/11. Hb 8.5-> 7.9 since arrival to ER today   - Denies any GI bleeding, check hemoccult   - iron studies, hemolytic panel, B12, folate ordered by hem/onc   -No need for transfusion.  -Started prednisone 60 mg twice daily. This is started by hematology.     # LUQ Abdominal pain   - Suspect related to splenomegaly  - CT abd today unremarkable with exception of splenomegaly   - pain control and monitoring   - gen surg consulted. -GI feels he does not have significant liver disease. Splenectomy done today.      #Splenomegaly now s/p splenectomy  - has f/w hem/onc in past for this and undergone unrevealing work up including negative BMBX in 2018, SAMSON-2 negative, negative autoimmune work up, no diagnosed liver disease. PET considered but was unable to be completed 2/2 pt weight   - Liver spleen scan pending   Pneumococcal vaccines - Prevnar 13 followed by Pneumovax 23 14 days after splenectomy.       #Leukopenia   - f/b hem/onc with unrevealing work up including NIRAV, rheumatoid factor to r/o Felty's syndrome, Copper, TSH, SPEP, LDH, haptoglobin  - suspected from hypersplenism      #RLE cellulitis   - completing course of clindamycin- continued on admit. patient reports significant improvement since starting clinda      #Morbid Obesity  - Body mass index is 50.85 kg/m². - Complicating assessment and treatment.  Placing patient at risk for multiple co-morbidities as well as early death and contributing to the patient's presentation.   - Counseled on weight loss.        DVT Prophylaxis: SCD  Diet: DIET GENERAL;   Code Status: Full Code       Beba Alvarado MD 8/14/2020 11:48 AM

## 2020-08-14 NOTE — PROGRESS NOTES
Chronic pattern of splenomegaly with no focal abnormality. 2. There are no other significant findings in the abdomen or pelvis. Liver/spleen scan 8/12/2020:  Hepatosplenomegaly.         Borderline colloid shift. PATHOLOGY:     BMBX 12/2018:    FINAL DIAGNOSIS:     Bone marrow biopsy, clot section, and aspirate:   - Trilineage hematopoiesis in a hypercellular bone marrow for age (~95%)   with a relative erythroid hyperplasia. - Absent iron stores. Peripheral blood:   - Leukopenia with absolute neutropenia and normochromic, normocytic   anemia. COMMENT: No current blood smear was available, but a blood smear from   12/5/18 was reviewed. The corresponding cytogenetic and molecular   analyses are pending at the time of this report. BUCCA/BUCCA     Result:   JAK2 V617F Mutation: Not Detected   JAK2, EXON 12-14 Mutation: Not Detected   Calreticulin (CALR) Mutation: Not Detected   MPL Mutation Not Detected     INTERPRETATION:     Bone Marrow: No phenotypically abnormal cell population identified. Suggest correlation of phenotype with clinical, morphologic, and other   pertinent laboratory findings, as well as other ancillary studies, as   appropriate.        ASSESSMENT:     Problem List Items Addressed This Visit     * (Principal) Anemia    Splenomegaly - Primary    Abdominal pain, left upper quadrant    Neutropenia (HCC)    Morbidly obese (HCC)      Other Visit Diagnoses     History of cellulitis                    PLAN:     Splenomegaly  25 cm, noted on repeated CT scans  Stable in size on CT 2/2019, 8/12/2020  BMBX negative 12/2018  SAMSON-2 negative  Autoimmune workup negatve  PET requested to assess for splenic lymphoma or occult malignancy - unable to be done due to his weight  No known liver disease - asked GI to assess since we are considering splenectomy to make sure to evidence of portal HTN or liver disease  GI checking serologies, recommend liver biopsy intraoperatively  Liver spleen scan shows (+) HSM, borderline colloid shift  Pt symptomatic with worsening L side abdominal pain  Appreciate surgery input - they are planning splenectomy  Will also check flow cytometry    Anemia  Hgb low normal at baseline  Was 10.7 in office yesterday, 7.9 on admission, now 8.2  Check iron studies, hemolytic panel, B12, folate  Jluis (+), LDH mildly elevated, haptoglobin low-normal  Started steroids 8/13/2020  Hgb up to 9.5  Hold transfusion for now  Splenectomy possibly therapeutic for hemolytic anemia  Monitor Hgb and hemolytic labs    Thrombocytopenia  Plt 27 in office yesterday  They were normal in the past and normal on repeat this admission  Suspect low plt count 8/11/2020 was lab error    Leukopenia  Noted to have chronic persistent leukopenia   Previously checked NIRAV, rheumatoid factor to r/o Felty's syndrome, Copper, TSH, SPEP which were unremarkable  BMBX negative 12/2018  Suspected from hypersplenism  Ig levels normal       Jenni Holcomb MD

## 2020-08-14 NOTE — PROGRESS NOTES
Bedside report received from Lyndsay 89 and CRNA  pt awake vitals WNL abdomen soft with midline abdomen incision with J.P. drain with serosanguinous drainage noted, pain ball in place taped and unclamped, ice pack in place. Samira Mireles

## 2020-08-14 NOTE — FLOWSHEET NOTE
08/13/20 1916   Vital Signs   Temp 99.1 °F (37.3 °C)   Temp Source Oral   Pulse 112   Heart Rate Source Monitor   Resp 16   /76   BP Location Left upper arm   BP Upper/Lower Upper   Level of Consciousness 0   MEWS Score 3   Oxygen Therapy   SpO2 99 %   O2 Device None (Room air)   Pt A/O x 4 assessment completed. PM meds given. Pt provided with a snack and reminded of NPO at 0000. Pt requesting something for heartburn,. Perfect serve sent to  no new orders placed. Pt denies any needs at this time.  Call light within reach

## 2020-08-14 NOTE — PROGRESS NOTES
C/o abdominal pain rate as 8/10 when awakened from resting. Resp e/e. Toradol 30 mg IV given. See MAR. Call light in reach.

## 2020-08-15 LAB
ANION GAP SERPL CALCULATED.3IONS-SCNC: 11 MMOL/L (ref 3–16)
ANISOCYTOSIS: ABNORMAL
BASOPHILS ABSOLUTE: 0 K/UL (ref 0–0.2)
BASOPHILS RELATIVE PERCENT: 0 %
BUN BLDV-MCNC: 17 MG/DL (ref 7–20)
CALCIUM SERPL-MCNC: 8.9 MG/DL (ref 8.3–10.6)
CHLORIDE BLD-SCNC: 98 MMOL/L (ref 99–110)
CO2: 23 MMOL/L (ref 21–32)
CREAT SERPL-MCNC: 0.7 MG/DL (ref 0.9–1.3)
EOSINOPHILS ABSOLUTE: 0 K/UL (ref 0–0.6)
EOSINOPHILS RELATIVE PERCENT: 0 %
GFR AFRICAN AMERICAN: >60
GFR NON-AFRICAN AMERICAN: >60
GLUCOSE BLD-MCNC: 119 MG/DL (ref 70–99)
HCT VFR BLD CALC: 30.2 % (ref 40.5–52.5)
HEMOGLOBIN: 9.5 G/DL (ref 13.5–17.5)
HYPOCHROMIA: ABNORMAL
IMMATURE RETIC FRACT: 0.66 (ref 0.21–0.37)
LACTATE DEHYDROGENASE: 278 U/L (ref 100–190)
LYMPHOCYTES ABSOLUTE: 1.9 K/UL (ref 1–5.1)
LYMPHOCYTES RELATIVE PERCENT: 23 %
MAGNESIUM: 2.5 MG/DL (ref 1.8–2.4)
MCH RBC QN AUTO: 28.3 PG (ref 26–34)
MCHC RBC AUTO-ENTMCNC: 31.6 G/DL (ref 31–36)
MCV RBC AUTO: 89.5 FL (ref 80–100)
MITOCHONDRIAL M2 AB, IGG: 2.9 UNITS (ref 0–24.9)
MONOCYTES ABSOLUTE: 0.7 K/UL (ref 0–1.3)
MONOCYTES RELATIVE PERCENT: 8 %
NEUTROPHILS ABSOLUTE: 5.7 K/UL (ref 1.7–7.7)
NEUTROPHILS RELATIVE PERCENT: 69 %
NUCLEATED RED BLOOD CELLS: 2 /100 WBC
NUCLEATED RED BLOOD CELLS: 2 /100 WBC
PDW BLD-RTO: 16.5 % (ref 12.4–15.4)
PHOSPHORUS: 4.2 MG/DL (ref 2.5–4.9)
PLATELET # BLD: 339 K/UL (ref 135–450)
PLATELET SLIDE REVIEW: ADEQUATE
PMV BLD AUTO: 8.6 FL (ref 5–10.5)
POLYCHROMASIA: ABNORMAL
POTASSIUM SERPL-SCNC: 4.2 MMOL/L (ref 3.5–5.1)
RBC # BLD: 3.37 M/UL (ref 4.2–5.9)
RETICULOCYTE ABSOLUTE COUNT: 0.28 M/UL
RETICULOCYTE COUNT PCT: 8.21 % (ref 0.5–2.18)
SLIDE REVIEW: ABNORMAL
SODIUM BLD-SCNC: 132 MMOL/L (ref 136–145)
WBC # BLD: 8.3 K/UL (ref 4–11)

## 2020-08-15 PROCEDURE — 85045 AUTOMATED RETICULOCYTE COUNT: CPT

## 2020-08-15 PROCEDURE — 6370000000 HC RX 637 (ALT 250 FOR IP): Performed by: SURGERY

## 2020-08-15 PROCEDURE — 83735 ASSAY OF MAGNESIUM: CPT

## 2020-08-15 PROCEDURE — 6360000002 HC RX W HCPCS: Performed by: SURGERY

## 2020-08-15 PROCEDURE — 36415 COLL VENOUS BLD VENIPUNCTURE: CPT

## 2020-08-15 PROCEDURE — 85025 COMPLETE CBC W/AUTO DIFF WBC: CPT

## 2020-08-15 PROCEDURE — 6370000000 HC RX 637 (ALT 250 FOR IP): Performed by: INTERNAL MEDICINE

## 2020-08-15 PROCEDURE — 84100 ASSAY OF PHOSPHORUS: CPT

## 2020-08-15 PROCEDURE — 99024 POSTOP FOLLOW-UP VISIT: CPT | Performed by: SURGERY

## 2020-08-15 PROCEDURE — 2500000003 HC RX 250 WO HCPCS: Performed by: SURGERY

## 2020-08-15 PROCEDURE — 80048 BASIC METABOLIC PNL TOTAL CA: CPT

## 2020-08-15 PROCEDURE — 1200000000 HC SEMI PRIVATE

## 2020-08-15 PROCEDURE — 2580000003 HC RX 258: Performed by: SURGERY

## 2020-08-15 PROCEDURE — 83615 LACTATE (LD) (LDH) ENZYME: CPT

## 2020-08-15 PROCEDURE — 99232 SBSQ HOSP IP/OBS MODERATE 35: CPT | Performed by: INTERNAL MEDICINE

## 2020-08-15 RX ADMIN — Medication 10 ML: at 08:08

## 2020-08-15 RX ADMIN — HYDROMORPHONE HYDROCHLORIDE 1 MG: 1 INJECTION, SOLUTION INTRAMUSCULAR; INTRAVENOUS; SUBCUTANEOUS at 09:44

## 2020-08-15 RX ADMIN — KETOROLAC TROMETHAMINE 30 MG: 30 INJECTION, SOLUTION INTRAMUSCULAR at 05:07

## 2020-08-15 RX ADMIN — SODIUM CHLORIDE: 9 INJECTION, SOLUTION INTRAVENOUS at 20:21

## 2020-08-15 RX ADMIN — HYDROMORPHONE HYDROCHLORIDE 1 MG: 1 INJECTION, SOLUTION INTRAMUSCULAR; INTRAVENOUS; SUBCUTANEOUS at 01:42

## 2020-08-15 RX ADMIN — HYDROMORPHONE HYDROCHLORIDE 1 MG: 1 INJECTION, SOLUTION INTRAMUSCULAR; INTRAVENOUS; SUBCUTANEOUS at 21:59

## 2020-08-15 RX ADMIN — KETOROLAC TROMETHAMINE 30 MG: 30 INJECTION, SOLUTION INTRAMUSCULAR at 20:22

## 2020-08-15 RX ADMIN — HYDROMORPHONE HYDROCHLORIDE 1 MG: 1 INJECTION, SOLUTION INTRAMUSCULAR; INTRAVENOUS; SUBCUTANEOUS at 18:04

## 2020-08-15 RX ADMIN — Medication 10 ML: at 20:22

## 2020-08-15 RX ADMIN — ACETAMINOPHEN 650 MG: 325 TABLET ORAL at 08:08

## 2020-08-15 RX ADMIN — SODIUM CHLORIDE: 9 INJECTION, SOLUTION INTRAVENOUS at 08:09

## 2020-08-15 RX ADMIN — HYDROMORPHONE HYDROCHLORIDE 1 MG: 1 INJECTION, SOLUTION INTRAMUSCULAR; INTRAVENOUS; SUBCUTANEOUS at 14:12

## 2020-08-15 RX ADMIN — PREDNISONE 40 MG: 20 TABLET ORAL at 08:09

## 2020-08-15 RX ADMIN — PREDNISONE 40 MG: 20 TABLET ORAL at 20:21

## 2020-08-15 RX ADMIN — SODIUM CHLORIDE: 9 INJECTION, SOLUTION INTRAVENOUS at 18:04

## 2020-08-15 ASSESSMENT — PAIN SCALES - GENERAL
PAINLEVEL_OUTOF10: 0
PAINLEVEL_OUTOF10: 5
PAINLEVEL_OUTOF10: 10
PAINLEVEL_OUTOF10: 0
PAINLEVEL_OUTOF10: 5
PAINLEVEL_OUTOF10: 5
PAINLEVEL_OUTOF10: 8
PAINLEVEL_OUTOF10: 4
PAINLEVEL_OUTOF10: 7
PAINLEVEL_OUTOF10: 7
PAINLEVEL_OUTOF10: 5
PAINLEVEL_OUTOF10: 0
PAINLEVEL_OUTOF10: 7
PAINLEVEL_OUTOF10: 3
PAINLEVEL_OUTOF10: 8

## 2020-08-15 ASSESSMENT — PAIN DESCRIPTION - PROGRESSION
CLINICAL_PROGRESSION: GRADUALLY WORSENING

## 2020-08-15 ASSESSMENT — PAIN DESCRIPTION - ORIENTATION
ORIENTATION: MID
ORIENTATION: LEFT

## 2020-08-15 ASSESSMENT — PAIN DESCRIPTION - ONSET
ONSET: ON-GOING
ONSET: ON-GOING

## 2020-08-15 ASSESSMENT — PAIN DESCRIPTION - LOCATION
LOCATION: HEAD
LOCATION: ABDOMEN

## 2020-08-15 ASSESSMENT — PAIN DESCRIPTION - FREQUENCY
FREQUENCY: CONTINUOUS
FREQUENCY: CONTINUOUS

## 2020-08-15 ASSESSMENT — PAIN DESCRIPTION - DESCRIPTORS
DESCRIPTORS: ACHING
DESCRIPTORS: ACHING

## 2020-08-15 ASSESSMENT — PAIN DESCRIPTION - PAIN TYPE
TYPE: ACUTE PAIN
TYPE: SURGICAL PAIN

## 2020-08-15 NOTE — PROGRESS NOTES
C/o abdominal pain rate as 8/10. Requesting pain medication after getting up to bathroom and walking 6 laps in room. Sitting up in chair. Dilaudid 1 mg IV given. See MAR. Call light in reach.

## 2020-08-15 NOTE — PROGRESS NOTES
Junior catheter removed per Dr. Jemal Melchor order. Pt tolerated well. Emptied 250 clear yellow urine. Urinal provided. Encourage pt to void within six hours. Call light in reach. Sitting up in chair.

## 2020-08-15 NOTE — PROGRESS NOTES
[facility] GENERAL SURGERY    Surgery Progress Note           POD # 1    PATIENT NAME: Sarah Giordano II     TODAY'S DATE: 8/15/2020    INTERVAL HISTORY:    Pt  Resting in chair. Adequate pain control, ambulating. Is thirsty, minimal appetite. OBJECTIVE:   VITALS:  /78   Pulse 83   Temp 97.4 °F (36.3 °C) (Oral)   Resp 16   Ht 6' 6\" (1.981 m)   Wt (!) 440 lb (199.6 kg)   SpO2 97%   BMI 50.85 kg/m²     INTAKE/OUTPUT:    I/O last 3 completed shifts: In: 1985 [P.O.:25; I.V.:1960]  Out: 2165 [Urine:1450; Drains:215; Blood:500]  I/O this shift:  In: 2161 [I.V.:2161]  Out: 36 [Urine:250; Drains:15]              CONSTITUTIONAL:  awake and alert  LUNGS:  clear to auscultation  ABDOMEN:   absent bowel sounds, soft, obese, tenderness noted at incision   INCISION: clean, dry, no drainage, sero-sanguinous drainage in YVES    Data:  CBC:   Recent Labs     08/13/20  0535  08/14/20  0557 08/14/20  1415 08/15/20  0516   WBC 1.4*  --   --   --  8.3   HGB 8.2*   < > 9.5* 10.2* 9.5*   HCT 24.8*   < > 29.3* 32.3* 30.2*     --   --   --  339    < > = values in this interval not displayed. BMP:    Recent Labs     08/13/20  0535 08/15/20  0516   * 132*   K 4.7 4.2    98*   CO2 28 23   BUN 14 17   CREATININE 0.7* 0.7*   GLUCOSE 123* 119*     Hepatic: No results for input(s): AST, ALT, ALB, BILITOT, ALKPHOS in the last 72 hours. Mag:      Recent Labs     08/15/20  0516   MG 2.50*      Phos:     Recent Labs     08/15/20  0516   PHOS 4.2      INR: No results for input(s): INR in the last 72 hours. Radiology Review:       ASSESSMENT AND PLAN:  31 y.o. 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 Of note, WBC is WNL today, which is new.    - clear liquids, advance as tolerated   - cont up OOB   - anticipate d/c home in 2-3 days based on pain control, advancing diet    - appreciate Hem/Onc input         Electronically signed by Perla Frias MD

## 2020-08-15 NOTE — PROGRESS NOTES
C/o headache behind left eye rate as 7/10. Requesting tylenol. Tylenol 650 mg PO given. Call light in reach.

## 2020-08-15 NOTE — PROGRESS NOTES
normal    Scheduled Meds:   predniSONE  40 mg Oral BID    pantoprazole  40 mg Oral QAM AC    sodium chloride flush  10 mL Intravenous 2 times per day       Continuous Infusions:   bupivacaine 0.5% 270 mL (08/14/20 0848)    sodium chloride 100 mL/hr at 08/15/20 0809       PRN Meds:  ketorolac, HYDROmorphone, sodium chloride flush, acetaminophen **OR** acetaminophen, polyethylene glycol, promethazine **OR** ondansetron      Data:  CBC:   Recent Labs     08/13/20  0535  08/14/20  0557 08/14/20  1415 08/15/20  0516   WBC 1.4*  --   --   --  8.3   HGB 8.2*   < > 9.5* 10.2* 9.5*   HCT 24.8*   < > 29.3* 32.3* 30.2*   MCV 89.1  --   --   --  89.5     --   --   --  339    < > = values in this interval not displayed. BMP:   Recent Labs     08/13/20  0535 08/15/20  0516   * 132*   K 4.7 4.2    98*   CO2 28 23   PHOS  --  4.2   BUN 14 17   CREATININE 0.7* 0.7*     LIVER PROFILE:   No results for input(s): AST, ALT, LIPASE, BILIDIR, BILITOT, ALKPHOS in the last 72 hours. Invalid input(s): AMYLASE,  ALB  PT/INR:   No results for input(s): PROTIME, INR in the last 72 hours. Results for Jose Thornton (MRN [id number]) as of 8/13/2020 08:11   Ref. Range 8/12/2020 07:20   ABO Rh Unknown A POS   Antibody Screen Unknown    Polyspecific Jluis Unknown POS   Direct antiglobulin test, IgG Unknown POS       Results for Jose Thornton (MRN [id number]) as of 8/13/2020 08:11   Ref.  Range 8/12/2020 08:19   Ferritin Latest Ref Range: 30.0 - 400.0 ng/mL 520.1 (H)   Iron Latest Ref Range: 59 - 158 ug/dL 35 (L)   Iron Saturation Latest Ref Range: 20 - 50 % 12 (L)   TIBC Latest Ref Range: 260 - 445 ug/dL 303   Folate Latest Ref Range: 4.78 - 24.20 ng/mL 6.22   Vitamin B-12 Latest Ref Range: 211 - 911 pg/mL 625       Assessment:  Principal Problem:    Anemia  Active Problems:    Splenomegaly    Abdominal pain, left upper quadrant    Other neutropenia (HCC)    Neutropenia (HCC)    Cellulitis of leg, right Morbidly obese ([facility] [address]    Hypersplenism    Hepatomegaly  Resolved Problems:    * No resolved hospital problems. *      Plan:    #Acute anemia-I suspect that he has hemolytic anemia. He is Jluis test positive. - Hb 10.7 per hem/onc notes on 8/11. Hb 8.5-> 7.9 since arrival to ER today   - Denies any GI bleeding, check hemoccult   - iron studies, hemolytic panel, B12, folate ordered by hem/onc   -No need for transfusion.  -Started prednisone 60 mg twice daily. This is started by hematology. Start weaning down prednisone.     # LUQ Abdominal pain   - Suspect related to splenomegaly  - CT abd today unremarkable with exception of splenomegaly   - pain control and monitoring   - gen surg consulted. -GI feels he does not have significant liver disease. Splenectomy done today.      #Splenomegaly now s/p splenectomy  - has f/w hem/onc in past for this and undergone unrevealing work up including negative BMBX in 2018, SAMSON-2 negative, negative autoimmune work up, no diagnosed liver disease. PET considered but was unable to be completed 2/2 pt weight   - Liver spleen scan pending   Pneumococcal vaccines - Prevnar 13 followed by Pneumovax 23- 14 days after splenectomy.       #Leukopenia resolved. This is after splenectomy. - f/b hem/onc with unrevealing work up including NIRAV, rheumatoid factor to r/o Felty's syndrome, Copper, TSH, SPEP, LDH, haptoglobin  - suspected from hypersplenism      #RLE cellulitis   - completing course of clindamycin- continued on admit. patient reports significant improvement since starting clinda      #Morbid Obesity  - Body mass index is 50.85 kg/m². - Complicating assessment and treatment.  Placing patient at risk for multiple co-morbidities as well as early death and contributing to the patient's presentation.   - Counseled on weight loss.        DVT Prophylaxis: SCD  Diet: DIET GENERAL;   Code Status: Full Code       Monique Sarabia MD 8/15/2020 1:40 PM

## 2020-08-15 NOTE — PROGRESS NOTES
Prn Dilaudid given for abdominal pain. Ice pack placed. Discussed with pt importance of deep breathing and ambulating.

## 2020-08-15 NOTE — PROGRESS NOTES
C/o abdominal pain rate as 7/10. Requesting pain medication. Dilaudid 1 mg IV given. Call light in reach.

## 2020-08-15 NOTE — PROGRESS NOTES
Resting in bed awake. C/o headache. Requesting tylenol. Am assessment complete. Alert and oriented. Call light in reach. Patient is able to demonstrated the ability to move from a reclining position to an upright position within the recliner.

## 2020-08-15 NOTE — PROGRESS NOTES
PM assessment completed, see flow sheet. Pt is alert and oriented. Respirations are even & easy at rest. Dressing to abdominal incision clean dry and intact, no drainage noted to dressing. Prn Toradol given. Pt denies needs at this time. SR up x 2, and bed in low position. Call light is within reach.

## 2020-08-15 NOTE — PROGRESS NOTES
Up and walked in hallway. Walked half the length of hallway. In bathroom at present time. Will call when ready to go back to chair. Call light in reach.

## 2020-08-15 NOTE — PROGRESS NOTES
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 He is actually been walking up and down the caban. REVIEW OF SYSTEMS:       He has mild abdominal pain. There are no other symptoms. There is no chest pain, pain in his legs, or shortness of breath. There is no bowel or bladder symptoms. PHYSICAL EXAM:       Vitals:    08/15/20 0808   BP:    Pulse: 83   Resp:    Temp:    SpO2:        CONSTITUTIONAL: awake, alert, cooperative, no apparent distress. The patient is morbidly obese. EYES: pupils equal, round and reactive to light, sclera clear and conjunctiva normal  ENT: Normocephalic, without obvious abnormality, atraumatic  NECK: supple, symmetrical, no jugular venous distension and no carotid bruits   HEMATOLOGIC/LYMPHATIC: no cervical, supraclavicular or axillary lymphadenopathy   LUNGS: no increased work of breathing and clear to auscultation   CARDIOVASCULAR: regular rate and rhythm, normal S1 and S2, no murmur noted  ABDOMEN: normal bowel sounds x 4, soft, non-distended, non-tender, no masses palpated, no hepatosplenomegaly   MUSCULOSKELETAL: full range of motion noted, tone is normal  NEUROLOGIC: awake, alert, oriented to name, place and time. Motor skills grossly intact. SKIN: Normal skin color, texture, turgor and no jaundice.  appears intact   EXTREMITIES: no LE edema     LABS:     Lab Results   Component Value Date    WBC 8.3 08/15/2020    HGB 9.5 (L) 08/15/2020    HCT 30.2 (L) 08/15/2020    MCV 89.5 08/15/2020     08/15/2020       Lab Results   Component Value Date    GLUCOSE 119 (H) 08/15/2020    BUN 17 08/15/2020    CREATININE 0.7 (L) 08/15/2020    K 4.2 08/15/2020    PHOS 4.2 08/15/2020       Lab Results   Component Value Date    ALKPHOS 66 08/12/2020    ALT 43 (H) 08/12/2020    AST 48 (H) 08/12/2020    BILITOT 0.9 08/12/2020    PROT 7.5 08/12/2020     Lab Results   Component Value Date    IRON 35 (L) 08/12/2020    TIBC 303 08/12/2020    FERRITIN 520.1 (H) 08/12/2020     Lab Results   Component Value Date    [id number] 625 08/12/2020     Lab Results   Component Value Date    FOLATE 6.22 08/12/2020     Jluis (+)   (H)  Haptoglobin 42 (wnl)  Retic - pending    Flow cytometry - pending    From 2018:    TSH 0.57  Rheum factor < 10  SPEP - no M spike  NIRAV - negative  Copper 165  Flow cytometry:  INTERPRETATION: - Bone Marrow: No phenotypically abnormal cell population identified. From 8/2020:        IgA, quant (mg/dL) [40 - 350]   208   IgG, quant (mg/dL)   1601  Ref Range: 650-1,600   IgM, quant (mg/dL) [50 - 300]   213         IMAGING:     Ct Abdomen Pelvis Wo Contrast Additional Contrast? None  Result Date: 8/12/2020  1. Chronic pattern of splenomegaly with no focal abnormality. 2. There are no other significant findings in the abdomen or pelvis. Liver/spleen scan 8/12/2020:  Hepatosplenomegaly.         Borderline colloid shift. PATHOLOGY:     BMBX 12/2018:    FINAL DIAGNOSIS:     Bone marrow biopsy, clot section, and aspirate:   - Trilineage hematopoiesis in a hypercellular bone marrow for age (~95%)   with a relative erythroid hyperplasia. - Absent iron stores. Peripheral blood:   - Leukopenia with absolute neutropenia and normochromic, normocytic   anemia. COMMENT: No current blood smear was available, but a blood smear from   12/5/18 was reviewed. The corresponding cytogenetic and molecular   analyses are pending at the time of this report.  BUCCA/BUCCA     Result:   JAK2 V617F Mutation: Not Detected   JAK2, EXON 12-14 Mutation: Not Detected   Calreticulin (CALR) Mutation: Not Detected   MPL Mutation Not Detected     INTERPRETATION:     Bone Marrow: No phenotypically abnormal cell population identified. Suggest correlation of phenotype with clinical, morphologic, and other   pertinent laboratory findings, as well as other ancillary studies, as   appropriate. ASSESSMENT:     Problem List Items Addressed This Visit     Splenomegaly - Primary    Neutropenia ([address]    Morbidly obese (HCC)    * (Principal) Anemia    Abdominal pain, left upper quadrant      Other Visit Diagnoses     History of cellulitis                    PLAN:     Splenomegaly  25 cm, noted on repeated CT scans  Stable in size on CT 2/2019, 8/12/2020  BMBX negative 12/2018  SAMSON-2 negative  Autoimmune workup negatve  PET requested to assess for splenic lymphoma or occult malignancy - unable to be done due to his weight  No known liver disease - asked GI to assess since we are considering splenectomy to make sure to evidence of portal HTN or liver disease  GI checking serologies, recommend liver biopsy intraoperatively  Liver spleen scan shows (+) HSM, borderline colloid shift  Pt symptomatic with worsening L side abdominal pain  Patient status post splenectomy and actually is feeling much better. He is walking in the halls and hopefully will have an uneventful recovery.     Anemia  Hgb low normal at baseline  Was 10.7 in office yesterday, 7.9 on admission, now 8.2  Check iron studies, hemolytic panel, B12, folate  Jluis (+), LDH mildly elevated, haptoglobin low-normal  Started steroids 8/13/2020  -He did not require a transfusion during surgery  -We will start weaning his steroids in a day or 2    Thrombocytopenia  -Platelet count is normal postsplenectomy    Leukopenia  Noted to have chronic persistent leukopenia   Previously checked NIRAV, rheumatoid factor to r/o Felty's syndrome, Copper, TSH, SPEP which were unremarkable  BMBX negative 12/2018  Suspected from hypersplenism  -White count is 8.9 today  -The patient states this is his highest is been in years.       Angelic Curtis MD

## 2020-08-16 LAB
ALPHA-1 ANTITRYPSIN PHENOTYPE: ABNORMAL
ALPHA-1 ANTITRYPSIN: 208 MG/DL (ref 90–200)
ANISOCYTOSIS: ABNORMAL
BASOPHILS ABSOLUTE: 0 K/UL (ref 0–0.2)
BASOPHILS RELATIVE PERCENT: 0 %
BLOOD BANK DISPENSE STATUS: NORMAL
BLOOD BANK PRODUCT CODE: NORMAL
BPU ID: NORMAL
DESCRIPTION BLOOD BANK: NORMAL
EOSINOPHILS ABSOLUTE: 0 K/UL (ref 0–0.6)
EOSINOPHILS RELATIVE PERCENT: 0 %
F-ACTIN AB IGG: 20 UNITS (ref 0–19)
HCT VFR BLD CALC: 30.3 % (ref 40.5–52.5)
HCT VFR BLD CALC: 30.5 % (ref 40.5–52.5)
HEMATOLOGY PATH CONSULT: NO
HEMOGLOBIN: 9.5 G/DL (ref 13.5–17.5)
HYPOCHROMIA: ABNORMAL
IMMATURE RETIC FRACT: 0.66 (ref 0.21–0.37)
LACTATE DEHYDROGENASE: 237 U/L (ref 100–190)
LYMPHOCYTES ABSOLUTE: 1.7 K/UL (ref 1–5.1)
LYMPHOCYTES RELATIVE PERCENT: 18 %
MCH RBC QN AUTO: 28.1 PG (ref 26–34)
MCHC RBC AUTO-ENTMCNC: 31.2 G/DL (ref 31–36)
MCV RBC AUTO: 89.9 FL (ref 80–100)
MONOCYTES ABSOLUTE: 0.7 K/UL (ref 0–1.3)
MONOCYTES RELATIVE PERCENT: 7 %
NEUTROPHILS ABSOLUTE: 7.1 K/UL (ref 1.7–7.7)
NEUTROPHILS RELATIVE PERCENT: 75 %
NUCLEATED RED BLOOD CELLS: 5 /100 WBC
NUCLEATED RED BLOOD CELLS: 5 /100 WBC
PDW BLD-RTO: 16.7 % (ref 12.4–15.4)
PLATELET # BLD: 403 K/UL (ref 135–450)
PLATELET SLIDE REVIEW: ADEQUATE
PMV BLD AUTO: 8.4 FL (ref 5–10.5)
POLYCHROMASIA: ABNORMAL
RBC # BLD: 3.37 M/UL (ref 4.2–5.9)
RETICULOCYTE ABSOLUTE COUNT: 0.35 M/UL
RETICULOCYTE COUNT PCT: 10.51 % (ref 0.5–2.18)
SLIDE REVIEW: ABNORMAL
SMOOTH MUSCLE AB IGG TITER: ABNORMAL
WBC # BLD: 9.5 K/UL (ref 4–11)

## 2020-08-16 PROCEDURE — 6370000000 HC RX 637 (ALT 250 FOR IP): Performed by: SURGERY

## 2020-08-16 PROCEDURE — 99232 SBSQ HOSP IP/OBS MODERATE 35: CPT | Performed by: INTERNAL MEDICINE

## 2020-08-16 PROCEDURE — 2500000003 HC RX 250 WO HCPCS: Performed by: SURGERY

## 2020-08-16 PROCEDURE — 36415 COLL VENOUS BLD VENIPUNCTURE: CPT

## 2020-08-16 PROCEDURE — 2580000003 HC RX 258: Performed by: SURGERY

## 2020-08-16 PROCEDURE — 99024 POSTOP FOLLOW-UP VISIT: CPT | Performed by: SURGERY

## 2020-08-16 PROCEDURE — 1200000000 HC SEMI PRIVATE

## 2020-08-16 PROCEDURE — 85045 AUTOMATED RETICULOCYTE COUNT: CPT

## 2020-08-16 PROCEDURE — 6360000002 HC RX W HCPCS: Performed by: SURGERY

## 2020-08-16 PROCEDURE — 85025 COMPLETE CBC W/AUTO DIFF WBC: CPT

## 2020-08-16 PROCEDURE — 83615 LACTATE (LD) (LDH) ENZYME: CPT

## 2020-08-16 PROCEDURE — 6370000000 HC RX 637 (ALT 250 FOR IP): Performed by: INTERNAL MEDICINE

## 2020-08-16 RX ADMIN — Medication 10 ML: at 21:03

## 2020-08-16 RX ADMIN — PANTOPRAZOLE SODIUM 40 MG: 40 TABLET, DELAYED RELEASE ORAL at 06:51

## 2020-08-16 RX ADMIN — ACETAMINOPHEN 650 MG: 325 TABLET ORAL at 06:51

## 2020-08-16 RX ADMIN — KETOROLAC TROMETHAMINE 30 MG: 30 INJECTION, SOLUTION INTRAMUSCULAR at 21:02

## 2020-08-16 RX ADMIN — PREDNISONE 40 MG: 20 TABLET ORAL at 08:46

## 2020-08-16 RX ADMIN — PREDNISONE 40 MG: 20 TABLET ORAL at 21:02

## 2020-08-16 RX ADMIN — HYDROMORPHONE HYDROCHLORIDE 1 MG: 1 INJECTION, SOLUTION INTRAMUSCULAR; INTRAVENOUS; SUBCUTANEOUS at 10:28

## 2020-08-16 RX ADMIN — HYDROMORPHONE HYDROCHLORIDE 1 MG: 1 INJECTION, SOLUTION INTRAMUSCULAR; INTRAVENOUS; SUBCUTANEOUS at 15:26

## 2020-08-16 RX ADMIN — KETOROLAC TROMETHAMINE 30 MG: 30 INJECTION, SOLUTION INTRAMUSCULAR at 06:51

## 2020-08-16 RX ADMIN — HYDROMORPHONE HYDROCHLORIDE 1 MG: 1 INJECTION, SOLUTION INTRAMUSCULAR; INTRAVENOUS; SUBCUTANEOUS at 00:50

## 2020-08-16 RX ADMIN — Medication 10 ML: at 08:46

## 2020-08-16 RX ADMIN — HYDROMORPHONE HYDROCHLORIDE 1 MG: 1 INJECTION, SOLUTION INTRAMUSCULAR; INTRAVENOUS; SUBCUTANEOUS at 21:02

## 2020-08-16 ASSESSMENT — PAIN SCALES - GENERAL
PAINLEVEL_OUTOF10: 8
PAINLEVEL_OUTOF10: 6
PAINLEVEL_OUTOF10: 0
PAINLEVEL_OUTOF10: 4
PAINLEVEL_OUTOF10: 0
PAINLEVEL_OUTOF10: 9
PAINLEVEL_OUTOF10: 8
PAINLEVEL_OUTOF10: 5

## 2020-08-16 ASSESSMENT — PAIN DESCRIPTION - FREQUENCY: FREQUENCY: CONTINUOUS

## 2020-08-16 ASSESSMENT — PAIN DESCRIPTION - LOCATION
LOCATION: ABDOMEN
LOCATION: ABDOMEN

## 2020-08-16 ASSESSMENT — PAIN DESCRIPTION - ORIENTATION
ORIENTATION: MID
ORIENTATION: MID

## 2020-08-16 ASSESSMENT — PAIN DESCRIPTION - DESCRIPTORS
DESCRIPTORS: CRAMPING
DESCRIPTORS: ACHING

## 2020-08-16 ASSESSMENT — PAIN DESCRIPTION - PAIN TYPE
TYPE: SURGICAL PAIN
TYPE: SURGICAL PAIN

## 2020-08-16 ASSESSMENT — PAIN DESCRIPTION - PROGRESSION: CLINICAL_PROGRESSION: GRADUALLY WORSENING

## 2020-08-16 ASSESSMENT — PAIN DESCRIPTION - ONSET: ONSET: ON-GOING

## 2020-08-16 NOTE — PROGRESS NOTES
ONCOLOGY FOLLOW-UP:         PROBLEM LIST:       Patient Active Problem List   Diagnosis Code    Chest pain R07.9    Congenital abnormality Q89.9    Anemia D64.9    Splenomegaly R16.1    Abdominal pain, left upper quadrant R10.12    Other neutropenia (HCC) D70.8    Neutropenia (HCC) D70.9    Cellulitis of leg, right L03.115    Morbidly obese (HCC) E66.01    Hypersplenism D73.1    Hepatomegaly R16.0       INTERVAL HISTORY:       The patient states that he continues to feel better. His abdominal pain is very mild. He has been up and walked to the bathroom by himself. He is having flatus, but no bowel movements. There is no nausea or vomiting. REVIEW OF SYSTEMS:       He has mild abdominal pain. There are no other symptoms. There is no chest pain, pain in his legs, or shortness of breath. There is no bowel or bladder symptoms. PHYSICAL EXAM:       Vitals:    08/16/20 0800   BP: (!) 143/86   Pulse: 83   Resp: 16   Temp: 97.7 °F (36.5 °C)   SpO2: 92%       CONSTITUTIONAL: awake, alert, cooperative, no apparent distress. The patient is morbidly obese. EYES: pupils equal, round and reactive to light, sclera clear and conjunctiva normal  ENT: Normocephalic, without obvious abnormality, atraumatic  NECK: supple, symmetrical, no jugular venous distension and no carotid bruits   HEMATOLOGIC/LYMPHATIC: no cervical, supraclavicular or axillary lymphadenopathy   LUNGS: no increased work of breathing and clear to auscultation   CARDIOVASCULAR: regular rate and rhythm, normal S1 and S2, no murmur noted  ABDOMEN: normal bowel sounds x 4, soft, non-distended, non-tender, no masses palpated, no hepatosplenomegaly   MUSCULOSKELETAL: full range of motion noted, tone is normal  NEUROLOGIC: awake, alert, oriented to name, place and time. Motor skills grossly intact. SKIN: Normal skin color, texture, turgor and no jaundice.  appears intact   EXTREMITIES: no LE edema     LABS:     Lab Results   Component Value Date    WBC 8.3 08/15/2020    HGB 9.5 (L) 08/15/2020    HCT 30.2 (L) 08/15/2020    MCV 89.5 08/15/2020     08/15/2020       Lab Results   Component Value Date    GLUCOSE 119 (H) 08/15/2020    BUN 17 08/15/2020    CREATININE 0.7 (L) 08/15/2020    K 4.2 08/15/2020    PHOS 4.2 08/15/2020       Lab Results   Component Value Date    ALKPHOS 66 08/12/2020    ALT 43 (H) 08/12/2020    AST 48 (H) 08/12/2020    BILITOT 0.9 08/12/2020    PROT 7.5 08/12/2020     Lab Results   Component Value Date    IRON 35 (L) 08/12/2020    TIBC 303 08/12/2020    FERRITIN 520.1 (H) 08/12/2020     Lab Results   Component Value Date    [id number] 625 08/12/2020     Lab Results   Component Value Date    FOLATE 6.22 08/12/2020     Jluis (+)   (H)  Haptoglobin 42 (wnl)  Retic - pending    Flow cytometry - pending    From 2018:    TSH 0.57  Rheum factor < 10  SPEP - no M spike  NIRAV - negative  Copper 165  Flow cytometry:  INTERPRETATION: - Bone Marrow: No phenotypically abnormal cell population identified. From 8/2020:        IgA, quant (mg/dL) [40 - 350]   208   IgG, quant (mg/dL)   1601  Ref Range: 650-1,600   IgM, quant (mg/dL) [50 - 300]   213         IMAGING:     Ct Abdomen Pelvis Wo Contrast Additional Contrast? None  Result Date: 8/12/2020  1. Chronic pattern of splenomegaly with no focal abnormality. 2. There are no other significant findings in the abdomen or pelvis. Liver/spleen scan 8/12/2020:  Hepatosplenomegaly.         Borderline colloid shift. PATHOLOGY:     BMBX 12/2018:    FINAL DIAGNOSIS:     Bone marrow biopsy, clot section, and aspirate:   - Trilineage hematopoiesis in a hypercellular bone marrow for age (~95%)   with a relative erythroid hyperplasia. - Absent iron stores. Peripheral blood:   - Leukopenia with absolute neutropenia and normochromic, normocytic   anemia. COMMENT: No current blood smear was available, but a blood smear from   12/5/18 was reviewed.  The corresponding cytogenetic and molecular   analyses are pending at the time of this report. BUCCA/BUCCA     Result:   JAK2 V617F Mutation: Not Detected   JAK2, EXON 12-14 Mutation: Not Detected   Calreticulin (CALR) Mutation: Not Detected   MPL Mutation Not Detected     INTERPRETATION:     Bone Marrow: No phenotypically abnormal cell population identified. Suggest correlation of phenotype with clinical, morphologic, and other   pertinent laboratory findings, as well as other ancillary studies, as   appropriate. ASSESSMENT:     Problem List Items Addressed This Visit     Splenomegaly - Primary    Neutropenia ([address]    Morbidly obese (HCC)    * (Principal) Anemia    Abdominal pain, left upper quadrant      Other Visit Diagnoses     History of cellulitis                    PLAN:     Splenomegaly  25 cm, noted on repeated CT scans  Stable in size on CT 2/2019, 8/12/2020  BMBX negative 12/2018  SAMSON-2 negative  Autoimmune workup negatve  PET requested to assess for splenic lymphoma or occult malignancy - unable to be done due to his weight  No known liver disease - asked GI to assess since we are considering splenectomy to make sure to evidence of portal HTN or liver disease  GI checking serologies, recommend liver biopsy intraoperatively  Liver spleen scan shows (+) HSM, borderline colloid shift  Pt symptomatic with worsening L side abdominal pain Prior to surgery. Patient status post splenectomy and actually is feeling much better. He is walking in the halls and hopefully will have an uneventful recovery.     Anemia  Hgb low normal at baseline  Was 10.7 in office yesterday, 7.9 on admission, now 8.2  Check iron studies, hemolytic panel, B12, folate  Jluis (+), LDH mildly elevated, haptoglobin low-normal  Started steroids 8/13/2020  -He did not require a transfusion during surgery  -CBC is pending from today  -Hopefully we can start weaning his steroids    Thrombocytopenia  -Platelet count is normal postsplenectomy  -It is pending from today    Leukopenia  Noted to have chronic persistent leukopenia   Previously checked NIRAV, rheumatoid factor to r/o Felty's syndrome, Copper, TSH, SPEP which were unremarkable  BMBX negative 12/2018  Suspected from hypersplenism  -White count was  normal yesterday, but is pending from today      Angelic Curtis MD

## 2020-08-16 NOTE — PROGRESS NOTES
C/o abdominal pain rate as 6/10. Requesting pain medication. Dilaudid 1 mg IV given. See MAR. Call light in reach.

## 2020-08-16 NOTE — PROGRESS NOTES
Bedside report given to [facility] for transfer of care. Prn Toradol and Tylenol given per request see mar.

## 2020-08-16 NOTE — PROGRESS NOTES
Progress Note    Admit Date:  8/12/2020    Subjective:  Mr. Hang Sotelo came to the ER with left upper quadrant pain. He has a history of chronic splenomegaly. He is morbidly obese. He also has a history of leukopenia and mild anemia. He has been evaluated by hematology in the past and has had an unremarkable bone marrow biopsy. Work-up in the emergency room showed he is anemic. There is also evidence of leukopenia and low platelets. CAT scan of the abdomen showed splenomegaly and mild hepatomegaly. Liver spleen scan was done which showed mild bilateral shift. Work-up showed that he is Jluis test positive. 8/14-GI saw him. They did not feel his splenomegaly is related to liver disease. Given large splenomegaly, splenectomy was done today. 8/15-doing better. Still has some pain. His counts have improved dramatically. 8/16- passing flatus. On a full liquid diet. No fever. Pain is doing better. Objective:   BP (!) 143/86   Pulse 83   Temp 97.7 °F (36.5 °C) (Oral)   Resp 16   Ht 6' 6\" (1.981 m)   Wt (!) 440 lb (199.6 kg)   SpO2 92%   BMI 50.85 kg/m²          Intake/Output Summary (Last 24 hours) at 8/16/2020 1109  Last data filed at 8/16/2020 1010  Gross per 24 hour   Intake 4580 ml   Output 2835 ml   Net 1745 ml       Physical Exam:    General appearance: alert, appears stated age and cooperative. He is morbidly obese  Head: Normocephalic, without obvious abnormality, atraumatic  Eyes: conjunctivae/corneas clear. PERRL, EOM's intact.   Neck: no adenopathy, no carotid bruit, no JVD, supple, symmetrical, trachea midline and thyroid not enlarged, symmetric, no tenderness/mass/nodules  Lungs: clear to auscultation bilaterally  Heart: regular rate and rhythm, S1, S2 normal, no murmur, click, rub or gallop  Abdomen: soft, non-tender; bowel sounds normal; no masses, mid line incision, s/p splenectomy  Extremities: extremities normal, atraumatic, no cyanosis or edema  Pulses: 2+ and symmetric  Skin: Skin color, texture, turgor normal. No rashes or lesions  Neurologic: Grossly normal    Scheduled Meds:   predniSONE  40 mg Oral BID    pantoprazole  40 mg Oral QAM AC    sodium chloride flush  10 mL Intravenous 2 times per day       Continuous Infusions:   bupivacaine 0.5% 270 mL (08/14/20 0848)       PRN Meds:  ketorolac, HYDROmorphone, sodium chloride flush, acetaminophen **OR** acetaminophen, polyethylene glycol, promethazine **OR** ondansetron      Data:  CBC:   Recent Labs     08/14/20  1415 08/15/20  0516 08/16/20  0619   WBC  --  8.3 9.5   HGB 10.2* 9.5* 9.5*   HCT 32.3* 30.2* 30.3*   MCV  --  89.5 89.9   PLT  --  339 403     BMP:   Recent Labs     08/15/20  0516   *   K 4.2   CL 98*   CO2 23   PHOS 4.2   BUN 17   CREATININE 0.7*     LIVER PROFILE:   No results for input(s): AST, ALT, LIPASE, BILIDIR, BILITOT, ALKPHOS in the last 72 hours. Invalid input(s): AMYLASE,  ALB  PT/INR:   No results for input(s): PROTIME, INR in the last 72 hours. Results for Marii Hughes (MRN [id number]) as of 8/13/2020 08:11   Ref. Range 8/12/2020 07:20   ABO Rh Unknown A POS   Antibody Screen Unknown    Polyspecific Jluis Unknown POS   Direct antiglobulin test, IgG Unknown POS       Results for Marii Hughes (MRN [id number]) as of 8/13/2020 08:11   Ref. Range 8/12/2020 08:19   Ferritin Latest Ref Range: 30.0 - 400.0 ng/mL 520.1 (H)   Iron Latest Ref Range: 59 - 158 ug/dL 35 (L)   Iron Saturation Latest Ref Range: 20 - 50 % 12 (L)   TIBC Latest Ref Range: 260 - 445 ug/dL 303   Folate Latest Ref Range: 4.78 - 24.20 ng/mL 6.22   Vitamin B-12 Latest Ref Range: 211 - 911 pg/mL 625       Assessment:  Principal Problem:    Anemia  Active Problems:    Splenomegaly    Abdominal pain, left upper quadrant    Other neutropenia (HCC)    Neutropenia (HCC)    Cellulitis of leg, right    Morbidly obese (HCC)    Hypersplenism    Hepatomegaly  Resolved Problems:    * No resolved hospital problems. *      Plan:    #Acute anemia-I suspect that he has hemolytic anemia. He is Jluis test positive. - Hb 10.7 per hem/onc notes on 8/11. Hb 8.5-> 7.9 since arrival to ER today   - Denies any GI bleeding, check hemoccult   - iron studies, hemolytic panel, B12, folate ordered by hem/onc   -No need for transfusion.  -Started prednisone 60 mg twice daily. This is started by hematology. Start weaning down prednisone.     # LUQ Abdominal pain   - Suspect related to splenomegaly  - CT abd today unremarkable with exception of splenomegaly   - pain control and monitoring   - gen surg consulted. -GI feels he does not have significant liver disease. Splenectomy done today.      #Splenomegaly now s/p splenectomy  - has f/w hem/onc in past for this and undergone unrevealing work up including negative BMBX in 2018, SAMSON-2 negative, negative autoimmune work up, no diagnosed liver disease. PET considered but was unable to be completed 2/2 pt weight   - Liver spleen scan pending   Pneumococcal vaccines - Prevnar 13 followed by Pneumovax 23- 14 days after splenectomy.       #Leukopenia resolved. This is after splenectomy. - f/b hem/onc with unrevealing work up including NIRAV, rheumatoid factor to r/o Felty's syndrome, Copper, TSH, SPEP, LDH, haptoglobin  - suspected from hypersplenism      #RLE cellulitis   - completing course of clindamycin- continued on admit. patient reports significant improvement since starting clinda      #Morbid Obesity  - Body mass index is 50.85 kg/m². - Complicating assessment and treatment.  Placing patient at risk for multiple co-morbidities as well as early death and contributing to the patient's presentation.   - Counseled on weight loss.        DVT Prophylaxis: SCD  Diet: DIET GENERAL;   Code Status: Full Code       Mars Yeh MD 8/16/2020 11:09 AM

## 2020-08-16 NOTE — PROGRESS NOTES
[facility] GENERAL SURGERY    Surgery Progress Note           POD # 2    PATIENT NAME: Loni Mensah II     TODAY'S DATE: 8/16/2020    INTERVAL HISTORY:    Pt  Feels better, passing gas, taking clears ok, hungry for more food, ambulating. OBJECTIVE:   VITALS:  BP (!) 143/86   Pulse 83   Temp 97.7 °F (36.5 °C) (Oral)   Resp 16   Ht 6' 6\" (1.981 m)   Wt (!) 440 lb (199.6 kg)   SpO2 92%   BMI 50.85 kg/m²     INTAKE/OUTPUT:    I/O last 3 completed shifts: In: 4714 [P.O.:1560; I.V.:3154]  Out: 1635 [Urine:1600; Drains:35]  I/O this shift:  In: -   Out: 1200 [Urine:1200]              CONSTITUTIONAL:  awake and alert  LUNGS:     ABDOMEN:    , soft, non-distended, tenderness noted at incision   INCISION: clean, dry, no drainage, sero-sanguinous drainage in YVES    Data:  CBC:   Recent Labs     08/14/20  1415 08/15/20  0516 08/16/20  0619   WBC  --  8.3 9.5   HGB 10.2* 9.5* 9.5*   HCT 32.3* 30.2* 30.3*   PLT  --  339 403     BMP:    Recent Labs     08/15/20  0516   *   K 4.2   CL 98*   CO2 23   BUN 17   CREATININE 0.7*   GLUCOSE 119*     Hepatic: No results for input(s): AST, ALT, ALB, BILITOT, ALKPHOS in the last 72 hours. Mag:      Recent Labs     08/15/20  0516   MG 2.50*      Phos:     Recent Labs     08/15/20  0516   PHOS 4.2      INR: No results for input(s): INR in the last 72 hours. Radiology Review:       ASSESSMENT AND PLAN:  31 y.o. male status post splenectomy for neutropenia.   Hematologic indices are all improving.   - full liquid diet   - HLIV   - increase up OOB, ambulate    - appreciate Hem/Onc input   - likely home in 1-2 days        Electronically signed by Ian Smith MD

## 2020-08-16 NOTE — PROGRESS NOTES
C/o abdominal pain after going to bathroom and MD removing abdominal dressing. Rate pain as 5/10. Requesting pain medication. Dilaudid 1 mg IV given. See MAR. Call light in reach.

## 2020-08-17 LAB
ANION GAP SERPL CALCULATED.3IONS-SCNC: 10 MMOL/L (ref 3–16)
ANISOCYTOSIS: ABNORMAL
ATYPICAL LYMPHOCYTE RELATIVE PERCENT: 5 % (ref 0–6)
BASOPHILS ABSOLUTE: 0.1 K/UL (ref 0–0.2)
BASOPHILS RELATIVE PERCENT: 1 %
BUN BLDV-MCNC: 18 MG/DL (ref 7–20)
CALCIUM SERPL-MCNC: 9.3 MG/DL (ref 8.3–10.6)
CHLORIDE BLD-SCNC: 101 MMOL/L (ref 99–110)
CO2: 25 MMOL/L (ref 21–32)
CREAT SERPL-MCNC: 0.9 MG/DL (ref 0.9–1.3)
EOSINOPHILS ABSOLUTE: 0.1 K/UL (ref 0–0.6)
EOSINOPHILS RELATIVE PERCENT: 1 %
GFR AFRICAN AMERICAN: >60
GFR NON-AFRICAN AMERICAN: >60
GLUCOSE BLD-MCNC: 98 MG/DL (ref 70–99)
HCT VFR BLD CALC: 31.2 % (ref 40.5–52.5)
HEMATOLOGY PATH CONSULT: NO
HEMOGLOBIN: 10 G/DL (ref 13.5–17.5)
HYPOCHROMIA: ABNORMAL
IMMATURE RETIC FRACT: 0.68 (ref 0.21–0.37)
LACTATE DEHYDROGENASE: 280 U/L (ref 100–190)
LYMPHOCYTES ABSOLUTE: 2.9 K/UL (ref 1–5.1)
LYMPHOCYTES RELATIVE PERCENT: 29 %
MCH RBC QN AUTO: 28.8 PG (ref 26–34)
MCHC RBC AUTO-ENTMCNC: 32 G/DL (ref 31–36)
MCV RBC AUTO: 89.9 FL (ref 80–100)
MONOCYTES ABSOLUTE: 1.1 K/UL (ref 0–1.3)
MONOCYTES RELATIVE PERCENT: 13 %
NEUTROPHILS ABSOLUTE: 4.4 K/UL (ref 1.7–7.7)
NEUTROPHILS RELATIVE PERCENT: 51 %
NUCLEATED RED BLOOD CELLS: 7 /100 WBC
NUCLEATED RED BLOOD CELLS: 7 /100 WBC
PDW BLD-RTO: 16.6 % (ref 12.4–15.4)
PHOSPHORUS: 4.4 MG/DL (ref 2.5–4.9)
PLATELET # BLD: 461 K/UL (ref 135–450)
PLATELET SLIDE REVIEW: ABNORMAL
PMV BLD AUTO: 8.5 FL (ref 5–10.5)
POIKILOCYTES: ABNORMAL
POLYCHROMASIA: ABNORMAL
POTASSIUM REFLEX MAGNESIUM: 4.3 MMOL/L (ref 3.5–5.1)
RBC # BLD: 3.47 M/UL (ref 4.2–5.9)
RETICULOCYTE ABSOLUTE COUNT: 0.39 M/UL
RETICULOCYTE COUNT PCT: 11.32 % (ref 0.5–2.18)
SLIDE REVIEW: ABNORMAL
SODIUM BLD-SCNC: 136 MMOL/L (ref 136–145)
WBC # BLD: 8.6 K/UL (ref 4–11)

## 2020-08-17 PROCEDURE — 2500000003 HC RX 250 WO HCPCS: Performed by: SURGERY

## 2020-08-17 PROCEDURE — 2580000003 HC RX 258: Performed by: SURGERY

## 2020-08-17 PROCEDURE — 85025 COMPLETE CBC W/AUTO DIFF WBC: CPT

## 2020-08-17 PROCEDURE — 6360000002 HC RX W HCPCS: Performed by: SURGERY

## 2020-08-17 PROCEDURE — 84100 ASSAY OF PHOSPHORUS: CPT

## 2020-08-17 PROCEDURE — 99231 SBSQ HOSP IP/OBS SF/LOW 25: CPT | Performed by: INTERNAL MEDICINE

## 2020-08-17 PROCEDURE — 6370000000 HC RX 637 (ALT 250 FOR IP): Performed by: NURSE PRACTITIONER

## 2020-08-17 PROCEDURE — 83615 LACTATE (LD) (LDH) ENZYME: CPT

## 2020-08-17 PROCEDURE — 99024 POSTOP FOLLOW-UP VISIT: CPT | Performed by: NURSE PRACTITIONER

## 2020-08-17 PROCEDURE — 80048 BASIC METABOLIC PNL TOTAL CA: CPT

## 2020-08-17 PROCEDURE — 6370000000 HC RX 637 (ALT 250 FOR IP): Performed by: SURGERY

## 2020-08-17 PROCEDURE — 1200000000 HC SEMI PRIVATE

## 2020-08-17 PROCEDURE — 6370000000 HC RX 637 (ALT 250 FOR IP): Performed by: INTERNAL MEDICINE

## 2020-08-17 PROCEDURE — 85045 AUTOMATED RETICULOCYTE COUNT: CPT

## 2020-08-17 PROCEDURE — 36415 COLL VENOUS BLD VENIPUNCTURE: CPT

## 2020-08-17 RX ORDER — PREDNISONE 20 MG/1
60 TABLET ORAL DAILY
Qty: 30 TABLET | Refills: 0 | OUTPATIENT
Start: 2020-08-17 | End: 2020-08-27

## 2020-08-17 RX ORDER — OXYCODONE HYDROCHLORIDE 5 MG/1
10 TABLET ORAL EVERY 4 HOURS PRN
Status: DISCONTINUED | OUTPATIENT
Start: 2020-08-17 | End: 2020-08-18 | Stop reason: HOSPADM

## 2020-08-17 RX ORDER — PREDNISONE 20 MG/1
60 TABLET ORAL DAILY
Status: DISCONTINUED | OUTPATIENT
Start: 2020-08-17 | End: 2020-08-18 | Stop reason: HOSPADM

## 2020-08-17 RX ORDER — PREDNISONE 20 MG/1
60 TABLET ORAL DAILY
Qty: 45 TABLET | Refills: 0 | Status: SHIPPED | OUTPATIENT
Start: 2020-08-17 | End: 2020-09-01

## 2020-08-17 RX ORDER — OXYCODONE HYDROCHLORIDE 5 MG/1
5 TABLET ORAL EVERY 4 HOURS PRN
Status: DISCONTINUED | OUTPATIENT
Start: 2020-08-17 | End: 2020-08-18 | Stop reason: HOSPADM

## 2020-08-17 RX ADMIN — PANTOPRAZOLE SODIUM 40 MG: 40 TABLET, DELAYED RELEASE ORAL at 04:12

## 2020-08-17 RX ADMIN — KETOROLAC TROMETHAMINE 30 MG: 30 INJECTION, SOLUTION INTRAMUSCULAR at 04:13

## 2020-08-17 RX ADMIN — ACETAMINOPHEN 650 MG: 325 TABLET ORAL at 04:12

## 2020-08-17 RX ADMIN — HYDROMORPHONE HYDROCHLORIDE 1 MG: 1 INJECTION, SOLUTION INTRAMUSCULAR; INTRAVENOUS; SUBCUTANEOUS at 06:32

## 2020-08-17 RX ADMIN — PREDNISONE 60 MG: 20 TABLET ORAL at 10:22

## 2020-08-17 RX ADMIN — ACETAMINOPHEN 650 MG: 325 TABLET ORAL at 14:24

## 2020-08-17 RX ADMIN — Medication 10 ML: at 10:22

## 2020-08-17 RX ADMIN — Medication 10 ML: at 06:33

## 2020-08-17 RX ADMIN — OXYCODONE HYDROCHLORIDE 10 MG: 5 TABLET ORAL at 14:24

## 2020-08-17 RX ADMIN — OXYCODONE HYDROCHLORIDE 5 MG: 5 TABLET ORAL at 21:02

## 2020-08-17 RX ADMIN — Medication 10 ML: at 21:02

## 2020-08-17 ASSESSMENT — PAIN SCALES - GENERAL
PAINLEVEL_OUTOF10: 5
PAINLEVEL_OUTOF10: 5
PAINLEVEL_OUTOF10: 8
PAINLEVEL_OUTOF10: 0
PAINLEVEL_OUTOF10: 3
PAINLEVEL_OUTOF10: 4
PAINLEVEL_OUTOF10: 4

## 2020-08-17 ASSESSMENT — PAIN DESCRIPTION - DESCRIPTORS
DESCRIPTORS: CRAMPING
DESCRIPTORS: CRAMPING

## 2020-08-17 ASSESSMENT — PAIN DESCRIPTION - LOCATION
LOCATION: ABDOMEN
LOCATION: ABDOMEN

## 2020-08-17 ASSESSMENT — PAIN DESCRIPTION - ORIENTATION
ORIENTATION: MID
ORIENTATION: MID

## 2020-08-17 ASSESSMENT — PAIN DESCRIPTION - PAIN TYPE
TYPE: SURGICAL PAIN
TYPE: SURGICAL PAIN

## 2020-08-17 NOTE — PROGRESS NOTES
Pt in bed resting comfortably and appears in no distress. Pt denies any needs at this time. Will continue to monitor. Call light within reach. Bedside report given to [facility] for transfer of care.

## 2020-08-17 NOTE — PROGRESS NOTES
ONCOLOGY FOLLOW-UP:         PROBLEM LIST:       Patient Active Problem List   Diagnosis Code    Chest pain R07.9    Congenital abnormality Q89.9    Anemia D64.9    Splenomegaly R16.1    Abdominal pain, left upper quadrant R10.12    Other neutropenia (HCC) D70.8    Neutropenia (HCC) D70.9    Cellulitis of leg, right L03.115    Morbidly obese (HCC) E66.01    Hypersplenism D73.1    Hepatomegaly R16.0       INTERVAL HISTORY:       POD 3. Passing gas. Pain controlled. REVIEW OF SYSTEMS:       He has mild abdominal pain. There are no other symptoms. There is no chest pain, pain in his legs, or shortness of breath. There is no bowel or bladder symptoms. PHYSICAL EXAM:       Vitals:    08/17/20 0406   BP: (!) 155/89   Pulse: 72   Resp: 15   Temp: 97.2 °F (36.2 °C)   SpO2:        CONSTITUTIONAL: awake, alert, cooperative, no apparent distress. The patient is morbidly obese. EYES: pupils equal, round and reactive to light, sclera clear and conjunctiva normal  ENT: Normocephalic, without obvious abnormality, atraumatic  NECK: supple, symmetrical, no jugular venous distension and no carotid bruits   HEMATOLOGIC/LYMPHATIC: no cervical, supraclavicular or axillary lymphadenopathy   LUNGS: no increased work of breathing and clear to auscultation   CARDIOVASCULAR: regular rate and rhythm, normal S1 and S2, no murmur noted  ABDOMEN: normal bowel sounds x 4, soft, non-distended, non-tender, no masses palpated, no hepatosplenomegaly   MUSCULOSKELETAL: full range of motion noted, tone is normal  NEUROLOGIC: awake, alert, oriented to name, place and time. Motor skills grossly intact. SKIN: Normal skin color, texture, turgor and no jaundice.  appears intact   EXTREMITIES: no LE edema     LABS:     Lab Results   Component Value Date    WBC 8.6 08/17/2020    HGB 10.0 (L) 08/17/2020    HCT 31.2 (L) 08/17/2020    MCV 89.9 08/17/2020     (H) 08/17/2020       Lab Results   Component Value Date    GLUCOSE 119 (H) 08/15/2020    BUN 17 08/15/2020    CREATININE 0.7 (L) 08/15/2020    K 4.2 08/15/2020    PHOS 4.2 08/15/2020       Lab Results   Component Value Date    ALKPHOS 66 08/12/2020    ALT 43 (H) 08/12/2020    AST 48 (H) 08/12/2020    BILITOT 0.9 08/12/2020    PROT 7.5 08/12/2020     Lab Results   Component Value Date    IRON 35 (L) 08/12/2020    TIBC 303 08/12/2020    FERRITIN 520.1 (H) 08/12/2020     Lab Results   Component Value Date    [id number] 625 08/12/2020     Lab Results   Component Value Date    FOLATE 6.22 08/12/2020     Jluis (+)   (H)  Haptoglobin 42 (wnl)  Retic - pending    Flow cytometry - pending    From 2018:    TSH 0.57  Rheum factor < 10  SPEP - no M spike  NIRAV - negative  Copper 165  Flow cytometry:  INTERPRETATION: - Bone Marrow: No phenotypically abnormal cell population identified. From 8/2020:        IgA, quant (mg/dL) [40 - 350]   208   IgG, quant (mg/dL)   1601  Ref Range: 650-1,600   IgM, quant (mg/dL) [50 - 300]   213         IMAGING:     Ct Abdomen Pelvis Wo Contrast Additional Contrast? None  Result Date: 8/12/2020  1. Chronic pattern of splenomegaly with no focal abnormality. 2. There are no other significant findings in the abdomen or pelvis. Liver/spleen scan 8/12/2020:  Hepatosplenomegaly.         Borderline colloid shift. PATHOLOGY:     BMBX 12/2018:    FINAL DIAGNOSIS:     Bone marrow biopsy, clot section, and aspirate:   - Trilineage hematopoiesis in a hypercellular bone marrow for age (~95%)   with a relative erythroid hyperplasia. - Absent iron stores. Peripheral blood:   - Leukopenia with absolute neutropenia and normochromic, normocytic   anemia. COMMENT: No current blood smear was available, but a blood smear from   12/5/18 was reviewed. The corresponding cytogenetic and molecular   analyses are pending at the time of this report.  BUCCA/BUCCA     Result:   JAK2 V617F Mutation: Not Detected   JAK2, EXON 12-14 Mutation: Not Detected Calreticulin (CALR) Mutation: Not Detected   MPL Mutation Not Detected     INTERPRETATION:     Bone Marrow: No phenotypically abnormal cell population identified. Suggest correlation of phenotype with clinical, morphologic, and other   pertinent laboratory findings, as well as other ancillary studies, as   appropriate. ASSESSMENT:     Problem List Items Addressed This Visit     * (Principal) Anemia    Splenomegaly - Primary    Abdominal pain, left upper quadrant    Neutropenia (HCC)    Morbidly obese (HCC)      Other Visit Diagnoses     History of cellulitis                    PLAN:     Splenomegaly  25 cm, noted on repeated CT scans  Stable in size on CT 2/2019, 8/12/2020  BMBX negative 12/2018  SAMSON-2 negative  Autoimmune workup negatve  PET requested to assess for splenic lymphoma or occult malignancy - unable to be done due to his weight  No known liver disease - asked GI to assess since we are considering splenectomy to make sure to evidence of portal HTN or liver disease  GI checking serologies, recommend liver biopsy intraoperatively  Liver spleen scan shows (+) HSM, borderline colloid shift  Pt symptomatic with worsening L side abdominal pain Prior to surgery. Patient status post splenectomy and actually is feeling much better. He is walking in the halls and hopefully will have an uneventful recovery.   Await path    Anemia  Hgb low normal at baseline  Was 10.7 in office yesterday, 7.9 on admission, now 8.2  Check iron studies, hemolytic panel, B12, folate  Jluis (+), LDH mildly elevated, haptoglobin low-normal  Started steroids 8/13/2020  -He did not require a transfusion during surgery  -CBC is improving  -on prednisone 40 mg bid  - will continue to taper    Thrombocytopenia  -Platelet count is normal postsplenectomy  -It is normal today    Leukopenia  Noted to have chronic persistent leukopenia   Previously checked NIRAV, rheumatoid factor to r/o Felty's syndrome, Copper, TSH, SPEP which were unremarkable  BMBX negative 12/2018  Suspected from hypersplenism  -White count has been normal since surgery      Willam Hanna MD

## 2020-08-17 NOTE — PROGRESS NOTES
General Surgery - [facility] [place], [address] [facility]  Daily Progress Note    Pt Name: Morgan Harada II  Medical Record Number: [id number]  Date of Birth 1990   Today's Date: 8/17/2020    ASSESSMENT  1. POD #3 S/P splenectomy, liver biopsy  2. ABD: morbidly obese, soft, staples look good, YVES in place, pain ball removed, no N/V, + flatus, + small BM  3. Labs unremarkable  4. YVES 27.5  5. UO good  6. PT c/o dizziness with ambulation      PLAN  1. GI proph Protonix  2. DVT proph: SCDs  3. On prednisone  4. Ambulate halls   5. Started PO pain meds today  6. Pt doing well POD #3: anticipate d/c home in the am      SUBJECTIVE  Gearline Ansley has improved from yesterday. Pain is well controlled. He has no nausea and no vomiting. He has passed flatus and has had a bowel movement. He is tolerating full liquid diet. Current activity is up with assistance    OBJECTIVE  VITALS:  height is 6' 6\" (1.981 m) and weight is 440 lb (199.6 kg) (abnormal). His oral temperature is 97.2 °F (36.2 °C). His blood pressure is 163/79 (abnormal) and his pulse is 90. His respiration is 16 and oxygen saturation is 94%. VITALS:  BP (!) 163/79   Pulse 90   Temp 97.2 °F (36.2 °C) (Oral)   Resp 16   Ht 6' 6\" (1.981 m)   Wt (!) 440 lb (199.6 kg)   SpO2 94%   BMI 50.85 kg/m²   INTAKE/OUTPUT:    Intake/Output Summary (Last 24 hours) at 8/17/2020 1355  Last data filed at 8/17/2020 1315  Gross per 24 hour   Intake 1870 ml   Output 27.5 ml   Net 1842.5 ml     GENERAL: alert, cooperative, no distress  I/O last 3 completed shifts: In: 7015 [P.O.:1860; I.V.:1557]  Out: 1227.5 [Urine:1200; Drains:27.5]  I/O this shift:   In: 480 [P.O.:480]  Out: -     LABS  Recent Labs     08/15/20  0516  08/17/20  0530 08/17/20  1147   WBC 8.3   < > 8.6  --    HGB 9.5*   < > 10.0*  --    HCT 30.2*   < > 31.2*  --       < > 461*  --    *  --   --  136   K 4.2  --   --  4.3   CL 98*  --   --  101   CO2 23  --   --  25   BUN 17  --   --  18   CREATININE 0.7*  --   -- 0.9   MG 2.50*  --   --   --    PHOS 4.2  --   --  4.4   CALCIUM 8.9  --   --  9.3    < > = values in this interval not displayed.      CBC with Differential:    Lab Results   Component Value Date    WBC 8.6 08/17/2020    RBC 3.47 08/17/2020    HGB 10.0 08/17/2020    HCT 31.2 08/17/2020     08/17/2020    MCV 89.9 08/17/2020    MCH 28.8 08/17/2020    MCHC 32.0 08/17/2020    RDW 16.6 08/17/2020    NRBC 7 08/17/2020    NRBC 7 08/17/2020    SEGSPCT 58.4 07/15/2012    BANDSPCT 1 08/13/2020    LYMPHOPCT 29.0 08/17/2020    MONOPCT 13.0 08/17/2020    BASOPCT 1.0 08/17/2020    MONOSABS 1.1 08/17/2020    LYMPHSABS 2.9 08/17/2020    EOSABS 0.1 08/17/2020    BASOSABS 0.1 08/17/2020    DIFFTYPE Auto 07/15/2012     CMP:    Lab Results   Component Value Date     08/17/2020    K 4.3 08/17/2020     08/17/2020    CO2 25 08/17/2020    BUN 18 08/17/2020    CREATININE 0.9 08/17/2020    GFRAA >60 08/17/2020    GFRAA >60 07/15/2012    AGRATIO 1.2 08/12/2020    LABGLOM >60 08/17/2020    GLUCOSE 98 08/17/2020    PROT 7.5 08/12/2020    PROT 7.9 07/15/2012    LABALBU 4.1 08/12/2020    CALCIUM 9.3 08/17/2020    BILITOT 0.9 08/12/2020    ALKPHOS 66 08/12/2020    AST 48 08/12/2020    ALT 43 08/12/2020         Aminata Lira  Electronically signed 8/17/2020 at 1:51 PM

## 2020-08-17 NOTE — OP NOTE
[address]                 [address] [address]                                OPERATIVE REPORT    PATIENT NAME: Cassy Carlton                      :        1990  MED REC NO:   [id number]                          ROOM:       87  ACCOUNT NO:   [de-identified]                           ADMIT DATE: 2020  PROVIDER:     Laverne Lobo MD    DATE OF PROCEDURE:  2020    OPERATION PERFORMED:  1. Splenectomy. 2.  Liver biopsy. PREOPERATIVE DIAGNOSES:  1. Hypersplenism. 2.  Hepatosplenomegaly. 3.  Chronic leukopenia. 4.  Anemia. 5.  Morbid obesity. POSTOPERATIVE DIAGNOSES:  1. Hypersplenism. 2.  Hepatosplenomegaly. 3.  Chronic leukopenia. 4.  Anemia. 5.  Morbid obesity. SURGEON:  Laverne Lobo MD    ANESTHESIA:  General.    COMPLICATIONS:  None. DRAINS:  Philip-Reynolds x1 in the left upper quadrant. ESTIMATED BLOOD LOSS:  200 mL. INDICATIONS FOR THE OPERATION:  The patient is a 62-year-old male with  chronic leukopenia. He presented with intractable left upper quadrant  pain. Previous hematologic workup had been nondiagnostic. Hematology  recommended splenectomy based on the probability of hypersplenism. The  patient was also symptomatic. The risks and benefits were explained. The patient understood them, accepted them and elected to proceed. DESCRIPTION OF OPERATION:  The patient was brought to the operating  room. General anesthesia was induced. He was prepped and draped in  usual surgical sterile fashion. Midline incision was made. The  peritoneal cavity was entered. The liver was large, but was  proportional to the patient's body habitus. There was no evidence  visually of abnormalities in liver. GI had requested a liver biopsy. Prostate biopsy gun was used to take two cores of liver tissue to  complete a liver biopsy. Spleen was markedly enlarged as expected.    Under direct vision, I took down the splenocolic ligament and the  gastrosplenic ligament. I then was able to feel behind the spleen and  elevate this up into the wound. An aortic clamp was placed across the  hilum. The hilum was then divided and the specimen was passed off onto  the back table. A 2-0 silk suture was used to control the splenic hilar  vessels. We then irrigated the left upper quadrant. There were a  couple of areas of oozing that were cauterized. A drain was left in the  left upper quadrant. Hemostasis was confirmed. A dual-lumen On-Q  PainBuster system was placed on both sides of the incision. The midline  fascia was closed with #1 Prolene starting superiorly and inferiorly and  tying in the middle. Skin clips were used to reapproximate the skin. Dressings were placed. DISPOSITION:  The patient tolerated the procedure without any acute  complication.         Fabian Laguerre MD    D: 08/17/2020 12:46:39       T: 08/17/2020 12:52:09     [id number]  Job#: [id number]     Doc#: [id number]    CC:  Wisam Goncalves MD

## 2020-08-17 NOTE — PROGRESS NOTES
Pt awake in bed. PM assessment complete. Stephie meds given. C/o abd pain 8/10. Medicated with both toradol & dilaudid. Ice pack provided. Hypoactive BS & pt is passing flatus, no BM. No other needs voiced. Call light in reach. Stella Astudillo, primary RN updated.  Lulú Gutierrez

## 2020-08-17 NOTE — PROGRESS NOTES
Progress Note    Admit Date:  8/12/2020    Subjective:  Mr. Jean Claude Lei came to the ER with left upper quadrant pain. He has a history of chronic splenomegaly. He is morbidly obese. He also has a history of leukopenia and mild anemia. He has been evaluated by hematology in the past and has had an unremarkable bone marrow biopsy. Work-up in the emergency room showed he is anemic. There is also evidence of leukopenia and low platelets. CAT scan of the abdomen showed splenomegaly and mild hepatomegaly. Liver spleen scan was done which showed mild bilateral shift. Work-up showed that he is Jluis test positive. 8/14-GI saw him. They did not feel his splenomegaly is related to liver disease. Given large splenomegaly, splenectomy was done today. 8/15-doing better. Still has some pain. His counts have improved dramatically. 8/16- passing flatus. On a full liquid diet. No fever. Pain is doing better. 8/17- doing well. Objective:   BP (!) 163/79   Pulse 90   Temp 97.2 °F (36.2 °C) (Oral)   Resp 16   Ht 6' 6\" (1.981 m)   Wt (!) 440 lb (199.6 kg)   SpO2 94%   BMI 50.85 kg/m²          Intake/Output Summary (Last 24 hours) at 8/17/2020 1140  Last data filed at 8/17/2020 0846  Gross per 24 hour   Intake 1630 ml   Output 27.5 ml   Net 1602.5 ml       Physical Exam:    General appearance: alert, appears stated age and cooperative. He is morbidly obese  Head: Normocephalic, without obvious abnormality, atraumatic  Eyes: conjunctivae/corneas clear. PERRL, EOM's intact.   Neck: no adenopathy, no carotid bruit, no JVD, supple, symmetrical, trachea midline and thyroid not enlarged, symmetric, no tenderness/mass/nodules  Lungs: clear to auscultation bilaterally  Heart: regular rate and rhythm, S1, S2 normal, no murmur, click, rub or gallop  Abdomen: soft, non-tender; bowel sounds normal; no masses, mid line incision, s/p splenectomy  Extremities: extremities normal, atraumatic, no cyanosis or edema  Pulses: 2+ and symmetric  Skin: Skin color, texture, turgor normal. No rashes or lesions  Neurologic: Grossly normal    Scheduled Meds:   predniSONE  60 mg Oral Daily    pantoprazole  40 mg Oral QAM AC    sodium chloride flush  10 mL Intravenous 2 times per day       Continuous Infusions:   bupivacaine 0.5% 270 mL (08/14/20 0848)       PRN Meds:  oxyCODONE **OR** oxyCODONE, ketorolac, HYDROmorphone, sodium chloride flush, acetaminophen **OR** acetaminophen, polyethylene glycol, promethazine **OR** ondansetron      Data:  CBC:   Recent Labs     08/15/20  0516 08/16/20  0619 08/17/20  0530   WBC 8.3 9.5 8.6   HGB 9.5* 9.5* 10.0*   HCT 30.2* 30.5*  30.3* 31.2*   MCV 89.5 89.9 89.9    403 461*     BMP:   Recent Labs     08/15/20  0516   *   K 4.2   CL 98*   CO2 23   PHOS 4.2   BUN 17   CREATININE 0.7*     LIVER PROFILE:   No results for input(s): AST, ALT, LIPASE, BILIDIR, BILITOT, ALKPHOS in the last 72 hours. Invalid input(s): AMYLASE,  ALB  PT/INR:   No results for input(s): PROTIME, INR in the last 72 hours. Results for Jose M Rubin (MRN [id number]) as of 8/13/2020 08:11   Ref. Range 8/12/2020 07:20   ABO Rh Unknown A POS   Antibody Screen Unknown    Polyspecific Jluis Unknown POS   Direct antiglobulin test, IgG Unknown POS       Results for Jose M Rubin (MRN [id number]) as of 8/13/2020 08:11   Ref.  Range 8/12/2020 08:19   Ferritin Latest Ref Range: 30.0 - 400.0 ng/mL 520.1 (H)   Iron Latest Ref Range: 59 - 158 ug/dL 35 (L)   Iron Saturation Latest Ref Range: 20 - 50 % 12 (L)   TIBC Latest Ref Range: 260 - 445 ug/dL 303   Folate Latest Ref Range: 4.78 - 24.20 ng/mL 6.22   Vitamin B-12 Latest Ref Range: 211 - 911 pg/mL 625       Assessment:  Principal Problem:    Anemia  Active Problems:    Splenomegaly    Abdominal pain, left upper quadrant    Other neutropenia (HCC)    Neutropenia (HCC)    Cellulitis of leg, right    Morbidly obese (HCC)    Hypersplenism Hepatomegaly  Resolved Problems:    * No resolved hospital problems. *      Plan:    #Acute anemia-I suspect that he has hemolytic anemia. He is Jluis test positive. - Hb 10.7 per hem/onc notes on 8/11. Hb 8.5-> 7.9 since arrival to ER today   - Denies any GI bleeding, check hemoccult   - iron studies, hemolytic panel, B12, folate ordered by hem/onc   -No need for transfusion.  -Started prednisone 60 mg twice daily. This is started by hematology. Start weaning down prednisone.     # LUQ Abdominal pain   - Suspect related to splenomegaly  - CT abd today unremarkable with exception of splenomegaly   - pain control and monitoring   - gen surg consulted. -GI feels he does not have significant liver disease. Splenectomy done today.      #Splenomegaly now s/p splenectomy  - has f/w hem/onc in past for this and undergone unrevealing work up including negative BMBX in 2018, SAMSON-2 negative, negative autoimmune work up, no diagnosed liver disease. PET considered but was unable to be completed 2/2 pt weight   - Liver spleen scan pending   Pneumococcal vaccines - Prevnar 13 followed by Pneumovax 23- 14 days after splenectomy.       #Leukopenia resolved. This is after splenectomy. - f/b hem/onc with unrevealing work up including NIRAV, rheumatoid factor to r/o Felty's syndrome, Copper, TSH, SPEP, LDH, haptoglobin  - suspected from hypersplenism      #RLE cellulitis   - completing course of clindamycin- continued on admit. patient reports significant improvement since starting clinda      #Morbid Obesity  - Body mass index is 50.85 kg/m². - Complicating assessment and treatment. Placing patient at risk for multiple co-morbidities as well as early death and contributing to the patient's presentation.   - Counseled on weight loss.       Medically stable for discharge.         DVT Prophylaxis: SCD  Diet: DIET GENERAL;   Code Status: Full Code       Guerrero Barger MD 8/17/2020 11:40 AM

## 2020-08-18 VITALS
TEMPERATURE: 97.7 F | DIASTOLIC BLOOD PRESSURE: 76 MMHG | HEIGHT: 78 IN | OXYGEN SATURATION: 97 % | BODY MASS INDEX: 36.45 KG/M2 | HEART RATE: 91 BPM | SYSTOLIC BLOOD PRESSURE: 128 MMHG | WEIGHT: 315 LBS | RESPIRATION RATE: 18 BRPM

## 2020-08-18 LAB
AFP: 1.5 UG/L
ANISOCYTOSIS: ABNORMAL
ATYPICAL LYMPHOCYTE RELATIVE PERCENT: 3 % (ref 0–6)
BANDED NEUTROPHILS RELATIVE PERCENT: 1 % (ref 0–7)
BASOPHILS ABSOLUTE: 0 K/UL (ref 0–0.2)
BASOPHILS RELATIVE PERCENT: 0 %
EOSINOPHILS ABSOLUTE: 0 K/UL (ref 0–0.6)
EOSINOPHILS RELATIVE PERCENT: 0 %
HCT VFR BLD CALC: 32.1 % (ref 40.5–52.5)
HEMOGLOBIN: 10.2 G/DL (ref 13.5–17.5)
HYPOCHROMIA: ABNORMAL
IMMATURE RETIC FRACT: 0.47 (ref 0.21–0.37)
LACTATE DEHYDROGENASE: 202 U/L (ref 100–190)
LYMPHOCYTES ABSOLUTE: 3.3 K/UL (ref 1–5.1)
LYMPHOCYTES RELATIVE PERCENT: 27 %
MCH RBC QN AUTO: 28.2 PG (ref 26–34)
MCHC RBC AUTO-ENTMCNC: 31.8 G/DL (ref 31–36)
MCV RBC AUTO: 88.7 FL (ref 80–100)
MONOCYTES ABSOLUTE: 1.1 K/UL (ref 0–1.3)
MONOCYTES RELATIVE PERCENT: 10 %
NEUTROPHILS ABSOLUTE: 6.6 K/UL (ref 1.7–7.7)
NEUTROPHILS RELATIVE PERCENT: 59 %
NUCLEATED RED BLOOD CELLS: 4 /100 WBC
NUCLEATED RED BLOOD CELLS: 4 /100 WBC
PDW BLD-RTO: 16.1 % (ref 12.4–15.4)
PLATELET # BLD: 529 K/UL (ref 135–450)
PLATELET SLIDE REVIEW: ABNORMAL
PMV BLD AUTO: 8.5 FL (ref 5–10.5)
POIKILOCYTES: ABNORMAL
POLYCHROMASIA: ABNORMAL
RBC # BLD: 3.62 M/UL (ref 4.2–5.9)
RETICULOCYTE ABSOLUTE COUNT: 0.36 M/UL
RETICULOCYTE COUNT PCT: 10.03 % (ref 0.5–2.18)
SLIDE REVIEW: ABNORMAL
WBC # BLD: 11 K/UL (ref 4–11)

## 2020-08-18 PROCEDURE — 2580000003 HC RX 258: Performed by: SURGERY

## 2020-08-18 PROCEDURE — 83615 LACTATE (LD) (LDH) ENZYME: CPT

## 2020-08-18 PROCEDURE — 85025 COMPLETE CBC W/AUTO DIFF WBC: CPT

## 2020-08-18 PROCEDURE — 6370000000 HC RX 637 (ALT 250 FOR IP): Performed by: NURSE PRACTITIONER

## 2020-08-18 PROCEDURE — 85045 AUTOMATED RETICULOCYTE COUNT: CPT

## 2020-08-18 PROCEDURE — 6370000000 HC RX 637 (ALT 250 FOR IP): Performed by: INTERNAL MEDICINE

## 2020-08-18 PROCEDURE — 36415 COLL VENOUS BLD VENIPUNCTURE: CPT

## 2020-08-18 PROCEDURE — 99024 POSTOP FOLLOW-UP VISIT: CPT | Performed by: SURGERY

## 2020-08-18 PROCEDURE — 6370000000 HC RX 637 (ALT 250 FOR IP): Performed by: SURGERY

## 2020-08-18 RX ORDER — OXYCODONE HYDROCHLORIDE 5 MG/1
5 TABLET ORAL EVERY 6 HOURS PRN
Qty: 28 TABLET | Refills: 0 | Status: SHIPPED | OUTPATIENT
Start: 2020-08-18 | End: 2020-08-25

## 2020-08-18 RX ADMIN — Medication 10 ML: at 08:28

## 2020-08-18 RX ADMIN — PANTOPRAZOLE SODIUM 40 MG: 40 TABLET, DELAYED RELEASE ORAL at 04:41

## 2020-08-18 RX ADMIN — OXYCODONE HYDROCHLORIDE 10 MG: 5 TABLET ORAL at 13:00

## 2020-08-18 RX ADMIN — PREDNISONE 60 MG: 20 TABLET ORAL at 08:29

## 2020-08-18 RX ADMIN — OXYCODONE HYDROCHLORIDE 10 MG: 5 TABLET ORAL at 08:28

## 2020-08-18 RX ADMIN — OXYCODONE HYDROCHLORIDE 10 MG: 5 TABLET ORAL at 04:41

## 2020-08-18 RX ADMIN — Medication 10 ML: at 01:01

## 2020-08-18 ASSESSMENT — PAIN DESCRIPTION - ORIENTATION: ORIENTATION: MID

## 2020-08-18 ASSESSMENT — PAIN SCALES - GENERAL
PAINLEVEL_OUTOF10: 7
PAINLEVEL_OUTOF10: 4
PAINLEVEL_OUTOF10: 6
PAINLEVEL_OUTOF10: 0

## 2020-08-18 ASSESSMENT — PAIN DESCRIPTION - PAIN TYPE
TYPE: SURGICAL PAIN
TYPE: SURGICAL PAIN

## 2020-08-18 ASSESSMENT — PAIN DESCRIPTION - LOCATION: LOCATION: ABDOMEN

## 2020-08-18 NOTE — DISCHARGE SUMMARY
Surgery Discharge Summary    Patient Identification  Sona Ortega II is a 31 y.o. male. :  1990  Admit Date:  2020    Discharge date:   No discharge date for patient encounter. Disposition: home    Discharge Diagnoses:   Patient Active Problem List   Diagnosis    Chest pain    Congenital abnormality    Anemia    Splenomegaly    Abdominal pain, left upper quadrant    Other neutropenia (HCC)    Neutropenia (HCC)    Cellulitis of leg, right    Morbidly obese (HCC)    Hypersplenism    Hepatomegaly         Consults: general surgery; hematology    Surgery: Splenectomy    Patient Instructions: Activity: no heavy lifting for 3 weeks  Diet: clear liquids, advance as tolerated  Wound Care: as directed    Follow-up with Darrell Neely   in 2 weeks. See pre-printed instructions in chart and given to patient upon discharge. Discharge Medications:      Huang Ross Medication Instructions ET    Printed on:20 8339   Medication Information                      Acetaminophen (TYLENOL EXTRA STRENGTH PO)  Take 1,000 mg by mouth daily as needed             ibuprofen (IBU) 800 MG tablet  Take 1 tablet by mouth every 8 hours as needed for Pain             oxyCODONE (ROXICODONE) 5 MG immediate release tablet  Take 1 tablet by mouth every 6 hours as needed for Pain for up to 7 days. Intended supply: 7 days. Take lowest dose possible to manage pain             predniSONE (DELTASONE) 20 MG tablet  Take 3 tablets by mouth daily for 15 days             PROAIR  (90 Base) MCG/ACT inhaler  Take 108 mcg by mouth as needed                  Condition at discharge: Stable    HPI and Hospital Course: Surgery and uneventful recovery.         [facility]

## 2020-08-18 NOTE — PLAN OF CARE
Problem: Falls - Risk of:  Goal: Will remain free from falls  Description: Will remain free from falls  Outcome: Ongoing     Problem: Pain:  Goal: Pain level will decrease  Description: Pain level will decrease  Outcome: Ongoing  Goal: Control of acute pain  Description: Control of acute pain  Outcome: Ongoing

## 2020-08-18 NOTE — PROGRESS NOTES
Am assessment completed. See flowsheet. Pt up in chair at this time. Pt denies needs at [facility] time. Call light within reach. Jeff Richards RN\

## 2020-08-18 NOTE — PROGRESS NOTES
Reviewed d/c instructions with pt and mother. Questions answered. Removed IV tolerated well. Waiting on pt friend to pick him up.

## 2020-08-18 NOTE — PLAN OF CARE
Problem: Falls - Risk of:  Goal: Will remain free from falls  Description: Will remain free from falls  8/18/2020 0827 by Silvio Starkey RN  Outcome: Ongoing  8/18/2020 0203 by Vianca Canas RN  Outcome: Ongoing     Problem: Pain:  Goal: Pain level will decrease  Description: Pain level will decrease  8/18/2020 0827 by Silvio Starkey RN  Outcome: Ongoing  8/18/2020 0203 by Vianca Canas RN  Outcome: Ongoing

## 2020-08-18 NOTE — FLOWSHEET NOTE
08/18/20 0815   Vital Signs   Temp 97 °F (36.1 °C)   Temp Source Oral   Pulse 60   Heart Rate Source Monitor   Resp 18   BP (!) 150/80   BP Location Left lower arm   BP Upper/Lower Lower   Patient Position Semi fowlers   Level of Consciousness 0   MEWS Score 1   Oxygen Therapy   SpO2 96 %   O2 Device None (Room air)   Pt resting in bed, denies needs. AM assessment complete. Call light in reach. Incision to abd with staples clean dry intact.

## 2020-08-18 NOTE — PROGRESS NOTES
ONCOLOGY FOLLOW-UP:         PROBLEM LIST:       Patient Active Problem List   Diagnosis Code    Chest pain R07.9    Congenital abnormality Q89.9    Anemia D64.9    Splenomegaly R16.1    Abdominal pain, left upper quadrant R10.12    Other neutropenia (HCC) D70.8    Neutropenia (HCC) D70.9    Cellulitis of leg, right L03.115    Morbidly obese (HCC) E66.01    Hypersplenism D73.1    Hepatomegaly R16.0       INTERVAL HISTORY:       POD 4. Hopes to go home. REVIEW OF SYSTEMS:       He has mild abdominal pain. There are no other symptoms. There is no chest pain, pain in his legs, or shortness of breath. There is no bowel or bladder symptoms. PHYSICAL EXAM:       Vitals:    08/18/20 0430   BP: 138/89   Pulse: 63   Resp: 16   Temp: 97 °F (36.1 °C)   SpO2: 95%       CONSTITUTIONAL: awake, alert, cooperative, no apparent distress. The patient is morbidly obese. EYES: pupils equal, round and reactive to light, sclera clear and conjunctiva normal  ENT: Normocephalic, without obvious abnormality, atraumatic  NECK: supple, symmetrical, no jugular venous distension and no carotid bruits   HEMATOLOGIC/LYMPHATIC: no cervical, supraclavicular or axillary lymphadenopathy   LUNGS: no increased work of breathing and clear to auscultation   CARDIOVASCULAR: regular rate and rhythm, normal S1 and S2, no murmur noted  ABDOMEN: normal bowel sounds x 4, soft, non-distended, non-tender, no masses palpated, no hepatosplenomegaly   MUSCULOSKELETAL: full range of motion noted, tone is normal  NEUROLOGIC: awake, alert, oriented to name, place and time. Motor skills grossly intact. SKIN: Normal skin color, texture, turgor and no jaundice.  appears intact   EXTREMITIES: no LE edema     LABS:     Lab Results   Component Value Date    WBC 11.0 08/18/2020    HGB 10.2 (L) 08/18/2020    HCT 32.1 (L) 08/18/2020    MCV 88.7 08/18/2020     (H) 08/18/2020       Lab Results   Component Value Date    GLUCOSE 98 08/17/2020    BUN 18 08/17/2020    CREATININE 0.9 08/17/2020    K 4.3 08/17/2020    PHOS 4.4 08/17/2020       Lab Results   Component Value Date    ALKPHOS 66 08/12/2020    ALT 43 (H) 08/12/2020    AST 48 (H) 08/12/2020    BILITOT 0.9 08/12/2020    PROT 7.5 08/12/2020     Lab Results   Component Value Date    IRON 35 (L) 08/12/2020    TIBC 303 08/12/2020    FERRITIN 520.1 (H) 08/12/2020     Lab Results   Component Value Date    TULADQCT17 625 08/12/2020     Lab Results   Component Value Date    FOLATE 6.22 08/12/2020     Jluis (+)   (H)  Haptoglobin 42 (wnl)  Retic - pending    Flow cytometry - pending    From 2018:    TSH 0.57  Rheum factor < 10  SPEP - no M spike  NIRAV - negative  Copper 165  Flow cytometry:  INTERPRETATION: - Bone Marrow: No phenotypically abnormal cell population identified. From 8/2020:        IgA, quant (mg/dL) [40 - 350]   208   IgG, quant (mg/dL)   1601  Ref Range: 650-1,600   IgM, quant (mg/dL) [50 - 300]   213         IMAGING:     Ct Abdomen Pelvis Wo Contrast Additional Contrast? None  Result Date: 8/12/2020  1. Chronic pattern of splenomegaly with no focal abnormality. 2. There are no other significant findings in the abdomen or pelvis. Liver/spleen scan 8/12/2020:  Hepatosplenomegaly.         Borderline colloid shift. PATHOLOGY:     BMBX 12/2018:    FINAL DIAGNOSIS:     Bone marrow biopsy, clot section, and aspirate:   - Trilineage hematopoiesis in a hypercellular bone marrow for age (~95%)   with a relative erythroid hyperplasia. - Absent iron stores. Peripheral blood:   - Leukopenia with absolute neutropenia and normochromic, normocytic   anemia. COMMENT: No current blood smear was available, but a blood smear from   12/5/18 was reviewed. The corresponding cytogenetic and molecular   analyses are pending at the time of this report.  BUCCA/BUCCA     Result:   JAK2 V617F Mutation: Not Detected   JAK2, EXON 12-14 Mutation: Not Detected   Calreticulin (CALR) Mutation: Not Detected   MPL Mutation Not Detected     INTERPRETATION:     Bone Marrow: No phenotypically abnormal cell population identified. Suggest correlation of phenotype with clinical, morphologic, and other   pertinent laboratory findings, as well as other ancillary studies, as   appropriate. ASSESSMENT:     Problem List Items Addressed This Visit     * (Principal) Anemia    Splenomegaly - Primary    Abdominal pain, left upper quadrant    Neutropenia (HCC)    Morbidly obese (HCC)      Other Visit Diagnoses     History of cellulitis                    PLAN:     Splenomegaly  25 cm, noted on repeated CT scans  Stable in size on CT 2/2019, 8/12/2020  BMBX negative 12/2018  SAMSON-2 negative  Autoimmune workup negatve  PET requested to assess for splenic lymphoma or occult malignancy - unable to be done due to his weight  No known liver disease - asked GI to assess since we are considering splenectomy to make sure to evidence of portal HTN or liver disease  GI checking serologies, recommend liver biopsy intraoperatively  Liver spleen scan shows (+) HSM, borderline colloid shift  Pt symptomatic with worsening L side abdominal pain Prior to surgery. Patient status post splenectomy and actually is feeling much better.     Await path    Anemia  Hgb low normal at baseline  Was 10.7 in office yesterday, 7.9 on admission  Check iron studies, hemolytic panel, B12, folate  Jluis (+), LDH mildly elevated, haptoglobin low-normal  Started steroids 8/13/2020  -He did not require a transfusion during surgery  -CBC is improving  -on prednisone 60 mg daily  - will continue to taper    Thrombocytopenia  -Platelet count is now high postsplenectomy    Leukopenia  Noted to have chronic persistent leukopenia   Previously checked NIRAV, rheumatoid factor to r/o Felty's syndrome, Copper, TSH, SPEP which were unremarkable  BMBX negative 12/2018  Suspected from hypersplenism  -White count has been normal since surgery      Grey Zamorano Darnell Benitez MD

## 2020-08-19 LAB
BLOOD BANK REF CASE: NORMAL
HAV IGM SER IA-ACNC: NORMAL
HEPATITIS B CORE IGM ANTIBODY: NORMAL
HEPATITIS B SURFACE ANTIGEN INTERPRETATION: NORMAL
HEPATITIS C ANTIBODY INTERPRETATION: NORMAL

## 2020-08-27 NOTE — PLAN OF CARE
Pt is s/p splenectomy with Dr. Lokesh Singletary. Pt is in need of vaccinations: HIB, meningococcal A & B and pneumococcal. Spoke with Mahad Shore in pharmacy all vaccines are still available for pt. Spoke with Samantha at outpatient center. Will order vaccines in patient chart and outpatient will print out the orders. Pt to arrive at 1300 on 8/28/20. Discussed with  Moriah Vasquez over the phone. He is able to make appointment. Pt to receive Bexsero, Menactra, Hiberix and pneumococcal vaccine. Discussed with Dr. Lokesh Singletary. Order faxed to outpatient.      Electronically signed by CRISTELA Hamilton Ace, CNP on 8/27/2020 at 1:24 PM

## 2020-08-28 ENCOUNTER — HOSPITAL ENCOUNTER (OUTPATIENT)
Dept: NUCLEAR MEDICINE | Age: 30
Discharge: HOME | End: 2020-08-28
Payer: COMMERCIAL

## 2020-08-28 ENCOUNTER — HOSPITAL ENCOUNTER (OUTPATIENT)
Dept: GENERAL RADIOLOGY | Age: 30
Discharge: HOME | End: 2020-08-28
Payer: COMMERCIAL

## 2020-08-28 ENCOUNTER — HOSPITAL ENCOUNTER (OUTPATIENT)
Dept: NURSING | Age: 30
Setting detail: INFUSION SERIES
Discharge: HOME | End: 2020-08-28
Payer: COMMERCIAL

## 2020-08-28 ENCOUNTER — HOSPITAL ENCOUNTER (OUTPATIENT)
Age: 30
Discharge: HOME | End: 2020-08-28
Payer: COMMERCIAL

## 2020-08-28 VITALS
RESPIRATION RATE: 16 BRPM | DIASTOLIC BLOOD PRESSURE: 93 MMHG | HEART RATE: 95 BPM | WEIGHT: 315 LBS | BODY MASS INDEX: 36.45 KG/M2 | SYSTOLIC BLOOD PRESSURE: 156 MMHG | TEMPERATURE: 98.3 F | HEIGHT: 78 IN

## 2020-08-28 LAB — SARS-COV-2, NAAT: NOT DETECTED

## 2020-08-28 PROCEDURE — 90620 MENB-4C VACCINE IM: CPT | Performed by: SURGERY

## 2020-08-28 PROCEDURE — A9540 TC99M MAA: HCPCS | Performed by: INTERNAL MEDICINE

## 2020-08-28 PROCEDURE — 3430000000 HC RX DIAGNOSTIC RADIOPHARMACEUTICAL: Performed by: INTERNAL MEDICINE

## 2020-08-28 PROCEDURE — G0009 ADMIN PNEUMOCOCCAL VACCINE: HCPCS | Performed by: SURGERY

## 2020-08-28 PROCEDURE — 99201 HC NEW PT, OUTPT VISIT LEVEL 1: CPT

## 2020-08-28 PROCEDURE — 90670 PCV13 VACCINE IM: CPT | Performed by: SURGERY

## 2020-08-28 PROCEDURE — A9558 XE133 XENON 10MCI: HCPCS | Performed by: INTERNAL MEDICINE

## 2020-08-28 PROCEDURE — 71046 X-RAY EXAM CHEST 2 VIEWS: CPT

## 2020-08-28 PROCEDURE — 2500000003 HC RX 250 WO HCPCS: Performed by: SURGERY

## 2020-08-28 PROCEDURE — 78582 LUNG VENTILAT&PERFUS IMAGING: CPT

## 2020-08-28 PROCEDURE — 6360000002 HC RX W HCPCS: Performed by: SURGERY

## 2020-08-28 PROCEDURE — U0002 COVID-19 LAB TEST NON-CDC: HCPCS

## 2020-08-28 PROCEDURE — 90471 IMMUNIZATION ADMIN: CPT

## 2020-08-28 PROCEDURE — 90472 IMMUNIZATION ADMIN EACH ADD: CPT | Performed by: SURGERY

## 2020-08-28 PROCEDURE — 90471 IMMUNIZATION ADMIN: CPT | Performed by: SURGERY

## 2020-08-28 PROCEDURE — 90734 MENACWYD/MENACWYCRM VACC IM: CPT | Performed by: SURGERY

## 2020-08-28 RX ORDER — XENON XE-133 10 MCI/1
15 GAS RESPIRATORY (INHALATION)
Status: COMPLETED | OUTPATIENT
Start: 2020-08-28 | End: 2020-08-28

## 2020-08-28 RX ADMIN — NEISSERIA MENINGITIDIS SEROGROUP B NHBA FUSION PROTEIN ANTIGEN, NEISSERIA MENINGITIDIS SEROGROUP B FHBP FUSION PROTEIN ANTIGEN AND NEISSERIA MENINGITIDIS SEROGROUP B NADA PROTEIN ANTIGEN 0.5 ML: 50; 50; 50; 25 INJECTION, SUSPENSION INTRAMUSCULAR at 13:22

## 2020-08-28 RX ADMIN — XENON XE-133 15 MILLICURIE: 10 GAS RESPIRATORY (INHALATION) at 16:30

## 2020-08-28 RX ADMIN — Medication 3 MILLICURIE: at 16:31

## 2020-08-28 RX ADMIN — Medication 0.5 ML: at 14:10

## 2020-08-28 RX ADMIN — NEISSERIA MENINGITIDIS GROUP A CAPSULAR POLYSACCHARIDE DIPHTHERIA TOXOID CONJUGATE ANTIGEN, NEISSERIA MENINGITIDIS GROUP C CAPSULAR POLYSACCHARIDE DIPHTHERIA TOXOID CONJUGATE ANTIGEN, NEISSERIA MENINGITIDIS GROUP Y CAPSULAR POLYSACCHARIDE DIPHTHERIA TOXOID CONJUGATE ANTIGEN, AND NEISSERIA MENINGITIDIS GROUP W-135 CAPSULAR POLYSACCHARIDE DIPHTHERIA TOXOID CONJUGATE ANTIGEN 0.5 ML: 4; 4; 4; 4 INJECTION, SOLUTION INTRAMUSCULAR at 13:35

## 2020-08-28 RX ADMIN — PNEUMOCOCCAL 13-VALENT CONJUGATE VACCINE 0.5 ML: 2.2; 2.2; 2.2; 2.2; 2.2; 4.4; 2.2; 2.2; 2.2; 2.2; 2.2; 2.2; 2.2 INJECTION, SUSPENSION INTRAMUSCULAR at 13:50

## 2020-08-28 ASSESSMENT — PAIN SCALES - GENERAL: PAINLEVEL_OUTOF10: 0

## 2020-08-28 NOTE — PROGRESS NOTES
Pt here for 4 vaccines  Pt reported that he had his spleen removed 2 weeks ago so he now needs the vaccines  Pt without any questions or concerns about the vaccines  Discussed with Magidel Jarvis [facility]  Will give the injections 15 mins apart to monitor for any signs of reactions    1322  Bexsero 0.5 ml was given IM Rt deltoid  1335 Menactra 0.5 mg was given IM Rt deltoid  1350 Prevnar 13 0.5 ml was given IM Left deltoid  1410 Hiberix 0.5 ml was given IM in Left deltoid  Pt jalil all injections well  Sites unremarkable and bandaids were applied to each  No signs of adverse reactions were noted  Pt without c/o's but reports that his arms are sore at the injection areas  Lungs CTA  resp easy and even  Discharge instructions reviewed with pt and copy was given  Pt was also given print out on all 4 of the injections for his review and he was informed of needing his Pneumovac 23 injection in 8 weeks  Pt receptive to all information and verbalized understanding   I also called and informed Aminata at Dr. Dalton Jc office of this  Pt was discharged ambulatory in stable condition with his mother

## 2020-09-01 NOTE — ADT AUTH CERT
Splenectomy - Care Day 3 (8/17/2020) by Diana Escobar         Review Status  Review Entered    Completed  9/1/2020 13:53        Criteria Review       Care Day: 3 Care Date: 8/17/2020 Level of Care:    Guideline Day 2    Level Of Care    (X) Floor    9/1/2020 1:53 PM EDT by Maggy Ramos      med surg    Clinical Status    (X) * Procedure completed    (X) * Hemodynamic stability    (X) * Stable Hgb/Hct    Activity    (X) Progressive ambulation    Routes    (X) IV fluids, medications    9/1/2020 1:53 PM EDT by Maggy Ramos      iv dilaudid x1, iv toradol x1    (X) Liquid diet    Interventions    (X) CBC    Medications    ( ) Possible PCA    9/1/2020 1:53 PM EDT by Maggy Ramos      iv dilaudid x1, iv toradol x1, po oxy x1    * Milestone    Additional Notes    Continued Stay Review Note         8/17/20         Patient Visit Status: inpt    Level of Care: med surg         Last set of vitals: BP (!) 163/79   Pulse 90   Temp 97.2 °F (36.2 °C) (Oral)   Resp 16   Ht 6' 6\" (1.981 m)   Wt (!) 440 lb (199.6 kg)   SpO2 94%   BMI 50.85 kg/m²         Current Treatments: POD#3; s/p splenectomy. Remains on advancing diet, iv dilaudid prn x1, iv toradol prn x1, drain care. Continuing to monitor labs. Hematology and surgery following         Labs:        Ref Range & Units 08/17/20 0530    WBC 4.0 - 11.0 K/uL 8.6    RBC 4.20 - 5.90 M/uL 3.47Low      Hemoglobin 13.5 - 17.5 g/dL 10.0Low      MCV 80.0 - 100.0 fL 89.9    MCH 26.0 - 34.0 pg 28.8    MCHC 31.0 - 36.0 g/dL 32.0    RDW 12.4 - 15.4 % 16. 6High      Platelets 393 - 256 K/uL 461High      Ref Range & Units 08/17/20 1147    Sodium 136 - 145 mmol/L 136    Potassium reflex Magnesium 3.5 - 5.1 mmol/L 4.3    Chloride 99 - 110 mmol/L 101    CO2 21 - 32 mmol/L 25    Anion Gap 3 - 16 10    Glucose 70 - 99 mg/dL 98    BUN 7 - 20 mg/dL 18    CREATININE 0.9 - 1.3 mg/dL 0.9       Review/Comments:    #Acute anemia-I suspect that he has hemolytic anemia.  He is Jluis test positive. - Hb 10.7 per hem/onc notes on 8/11.  Hb 8.5-> 7.9 since arrival to ER today    - Denies any GI bleeding, check hemoccult    - iron studies, hemolytic panel, B12, folate ordered by hem/onc    -No need for transfusion.    -Started prednisone 60 mg twice daily.  This is started by hematology.  Start weaning down prednisone.         # LUQ Abdominal pain    - Suspect related to splenomegaly    - CT abd today unremarkable with exception of splenomegaly    - pain control and monitoring    - gen surg consulted. -GI feels he does not have significant liver disease. Splenectomy done today.         #Splenomegaly now s/p splenectomy    - has f/w hem/onc in past for this and undergone unrevealing work up including negative BMBX in 2018, SAMSON-2 negative, negative autoimmune work up, no diagnosed liver disease.  PET considered but was unable to be completed 2/2 pt weight    - Liver spleen scan pending    Pneumococcal vaccines - Prevnar 13 followed by Pneumovax 23- 14 days after splenectomy.          #Leukopenia resolved.  This is after splenectomy. 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   patient reports significant improvement since starting clinda          #Morbid Obesity    - Body mass index is 50.85 kg/m². - Complicating assessment and treatment. Placing patient at risk for multiple co-morbidities as well as early death and contributing to the patient's presentation.    - Counseled on weight loss. Surgery MD Notes    ASSESSMENT    1. POD #3 S/P splenectomy, liver biopsy    2. ABD: morbidly obese, soft, staples look good, YVES in place, pain ball removed, no N/V, + flatus, + small BM    3. Labs unremarkable    4. YVES 27.5    5. UO good    6.  PT c/o dizziness with ambulation            PLAN    1. GI proph Protonix    2. DVT proph: SCDs    3. On prednisone    4. Ambulate halls    5. Started PO pain meds today       Hematology MD Notes    Splenomegaly    25 cm, noted on repeated CT scans    Stable in size on CT 2/2019, 8/12/2020    BMBX negative 12/2018    SAMSON-2 negative    Autoimmune workup negatve    PET requested to assess for splenic lymphoma or occult malignancy - unable to be done due to his weight    No known liver disease - asked GI to assess since we are considering splenectomy to make sure to evidence of portal HTN or liver disease    GI checking serologies, recommend liver biopsy intraoperatively    Liver spleen scan shows (+) HSM, borderline colloid shift    Pt symptomatic with worsening L side abdominal pain Prior to surgery. Patient status post splenectomy and actually is feeling much better.  He is walking in the halls and hopefully will have an uneventful recovery.     Await path         Anemia    Hgb low normal at baseline    Was 10.7 in office yesterday, 7.9 on admission, now 8.2    Check iron studies, hemolytic panel, B12, folate    Jluis (+), LDH mildly elevated, haptoglobin low-normal    Started steroids 8/13/2020    -He did not require a transfusion during surgery    -CBC is improving    -on prednisone 40 mg bid    - will continue to taper         Thrombocytopenia    -Platelet count is normal postsplenectomy    -It is normal today         Leukopenia    Noted to have chronic persistent leukopenia    Previously checked NIRAV, rheumatoid factor to r/o Felty's syndrome, Copper, TSH, SPEP which were unremarkable    BMBX negative 12/2018    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 -GI feels he does not have significant liver disease. Splenectomy done today.         #Splenomegaly now s/p splenectomy    - has f/w hem/onc in past for this and undergone unrevealing work up including negative BMBX in 2018, SAMSON-2 negative, negative autoimmune work up, no diagnosed liver disease.  PET considered but was unable to be completed 2/2 pt weight    - Liver spleen scan pending    Pneumococcal vaccines - Prevnar 13 followed by Pneumovax 23- 14 days after splenectomy.              #Leukopenia resolved.  This is after splenectomy. 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   patient reports significant improvement since starting clinda          #Morbid Obesity    - Body mass index is 50.85 kg/m². - Complicating assessment and treatment.  Placing patient at risk for multiple co-morbidities as well as early death and contributing to the patient's presentation.    - Counseled on weight loss.         Surgery MD Notes    31 y.o. male status post splenectomy for neutropenia.  Hematologic indices are all improving.     - full liquid diet     - HLIV     - increase up OOB, ambulate     - appreciate Hem/Onc input     - likely home in 1-2 days       Anticipated Discharge Plan: from home             Splenectomy - Care Day 1 (8/15/2020) by Caesar Dunn         Review Status  Review Entered    Completed  9/1/2020 13:41        Criteria Review       Care Day: 1 Care Date: 8/15/2020 Level of Care:    Guideline Day 1    Level Of Care    (X) OR to floor    9/1/2020 1:41 PM EDT by eBnny De La Torre      POD#1    Clinical Status    (X) * Clinical Indications met [G] 9/1/2020 1:41 PM EDT by Saira Goel      see clinical indications and review notes    Activity    (X) Possible limited ambulation postoperatively    Routes    (X) IV fluids, medications    9/1/2020 1:41 PM EDT by Saira Goel      ivf @ 100/hr    Interventions    (X) CBC    Medications    ( ) Possible PCA    9/1/2020 1:41 PM EDT by Saira Goel      iv dilaudid prn x5, iv toradol prn x2    * Milestone    Additional Notes    Continued Stay Review Note         8/15/20         Patient Visit Status: inpt    Level of Care: med surg         Last set of vitals: /78   Pulse 83   Temp 97.4 °F (36.3 °C) (Oral)   Resp 16   Ht 6' 6\" (1.981 m)   Wt (!) 440 lb (199.6 kg)   SpO2 97%   BMI 50.85 kg/m²          Current Treatments: POD#1 s/p splenectomy. Remains on ivf @ 100/hr, advancing to clear liquids, iv dilaudid prn x5, iv toradol prn x2, drain care. Continuing to monitor labs.  Hematology and surgery following         Labs:        Ref Range & Units 08/15/20 0516    Sodium 136 - 145 mmol/L 132Low      Potassium 3.5 - 5.1 mmol/L 4.2    Chloride 99 - 110 mmol/L 98Low      CO2 21 - 32 mmol/L 23    Anion Gap 3 - 16 11    Glucose 70 - 99 mg/dL 119High      BUN 7 - 20 mg/dL 17    CREATININE 0.9 - 1.3 mg/dL 0.7       Ref Range & Units 08/15/20 0516    WBC 4.0 - 11.0 K/uL 8.3    RBC 4.20 - 5.90 M/uL 3.37Low      Hemoglobin 13.5 - 17.5 g/dL 9.5Low      MCV 80.0 - 100.0 fL 89.5    MCH 26.0 - 34.0 pg 28.3    MCHC 31.0 - 36.0 g/dL 31.6    RDW 12.4 - 15.4 % 16.5High      Platelets 222 - 522 K/uL 339    MPV 5.0 - 10.5 fL 8.6       Review/Comments:    Hematology MD Notes    Splenomegaly    25 cm, noted on repeated CT scans    Stable in size on CT 2/2019, 8/12/2020    BMBX negative 12/2018    SAMSON-2 negative    Autoimmune workup negatve    PET requested to assess for splenic lymphoma or occult malignancy - unable to be done due to his weight    No known liver disease - asked GI to assess since we are considering splenectomy to make sure to evidence of portal HTN or liver disease    GI checking serologies, recommend liver biopsy intraoperatively    Liver spleen scan shows (+) HSM, borderline colloid shift    Pt symptomatic with worsening L side abdominal pain    Patient status post splenectomy and actually is feeling much better. [facility] is walking in the halls and hopefully will have an uneventful recovery.         Anemia    Hgb low normal at baseline    Was 10.7 in office yesterday, 7.9 on admission, now 8.2    Check iron studies, hemolytic panel, B12, folate    Jluis (+), LDH mildly elevated, haptoglobin low-normal    Started steroids 8/13/2020    -He did not require a transfusion during surgery    -We will start weaning his steroids in a day or 2         Thrombocytopenia    -Platelet count is normal postsplenectomy         Leukopenia    Noted to have chronic persistent leukopenia    Previously checked NIRAV, rheumatoid factor to r/o Felty's syndrome, Copper, TSH, SPEP which were unremarkable    BMBX negative 12/2018    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        Surgery MD Notes    31 y.o. 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  Of note, WBC is WNL today, which is new.     - clear liquids, advance as tolerated     - cont up OOB     - anticipate d/c home in 2-3 days based on pain control, advancing diet     - appreciate Hem/Onc input       Anticipated Discharge Plan: return home      Splenectomy - Clinical Indications for Procedure by Maciej Torres Status  Review Entered    Completed  9/1/2020 13:40        Criteria Review       Clinical Indications for Procedure    Most Recent : Nedra Hatfield Most Recent Date: 9/1/2020 1:40 PM EDT    (X) Procedure is indicated for  1 or more  of the following  (1):       (X) Treatment of hematologic disorder (eg, spherocytosis, myeloproliferative conditions) to control       anemia, thrombocytopenia, or symptomatic splenomegaly (12) (21) (22)

## 2020-09-08 ENCOUNTER — TELEPHONE (OUTPATIENT)
Dept: SURGERY | Age: 30
End: 2020-09-08

## 2020-09-08 NOTE — TELEPHONE ENCOUNTER
I called and spoke to patient. He is requesting a work note so he can return to work on 9/21/2020, light duty for 2 weeks. He is requesting it be faxed to [facility] attn: Bernarda Galindo at fax number [phone]. Letter faxed as requested.

## 2020-09-08 NOTE — LETTER
[facility]  [address] [place] [address] [address]  [id number] [address]  Phone: [phone]  Fax: [phone]    Adriana Guerrero MD        September 8, 2020     Patient: Ayala Verdugo   YOB: 1990   Date of Visit: 9/8/2020       To Whom It May Concern:    Please excuse Yesenia Lewis from work from 8/12/2020 through 9/20/2020. It is my medical opinion that Yesenia Lewis may return to work on 9/21/2020 with the following restrictions: lifting/carrying not to exceed 15 lbs. , pushing/pulling not to exceed 15 lbs. until 10/5/2020. On 10/6/2020 he may resume normal duties with no restrictions. If you have any questions or concerns, please don't hesitate to call.     Sincerely,        Adriana Guerrero MD

## 2020-09-17 NOTE — ADT AUTH CERT
Iron Deficiency or Unspecified - Care Day 2 (8/13/2020) by Kyra Gayle RN         Review Status  Review Entered    Completed  8/13/2020 14:47        Criteria Review       Care Day: 2 Care Date: 8/13/2020 Level of Care: Inpatient Floor    Guideline Day 2    Level Of Care    (X) Floor to discharge    8/13/2020 2:47 PM EDT by Jean Skinner      med surg    Clinical Status    (X) * Hemodynamic stability    8/13/2020 2:47 PM EDT by Rik Weeks: 98.3, HR 88, RR 16, /75, 95% on RA    (X) * Mental status at baseline    8/13/2020 2:47 PM EDT by Jean Boone appearance: alert, appears stated age and cooperative. [facility] is morbidly obese    ( ) * Active blood loss absent    ( ) * Signs and symptoms of anemia absent or improved    ( ) * Hgb/Hct level stable and acceptable for next level of care    ( ) * Etiology of anemia requiring inpatient care absent    ( ) * Discharge plans and education understood    Activity    (X) * Ambulatory or acceptable for next level of care [G]    8/13/2020 2:47 PM EDT by Sathish Wyman up as tolerated    Routes    (X) * Oral hydration, medications, and diet    8/13/2020 2:47 PM EDT by Jean boone diet, NPO after midnight  Cleocin 300mg PO 4x daily  Prednisone 60mg PO BID  Protonix 40mg PO daily  Tylenol 650mg PO q6h PRn x 1    Interventions    (X) Hgb/Hct    8/13/2020 2:47 PM EDT by Jean Skinner      Hemoglobin Quant: 8.2 (L)  Hematocrit: 24.8 (L)    * Milestone    Additional Notes    8/13/2020 - Care day 2    LOC - IP - concurrent review - med surg       IM note:    #Acute anemia-I suspect that he has hemolytic anemia.  He is Jluis test positive.     - Hb 10.7 per hem/onc notes on 8/11.  Hb 8.5-> 7.9 since arrival to ER today    - Denies any GI bleeding, check hemoccult    - iron studies, hemolytic panel, B12, folate ordered by hem/onc    -No need for transfusion.    -Start prednisone today.  60 mg twice daily.  This is started by hematology.         # LUQ Abdominal pain    - Suspect related to splenomegaly    - CT abd today unremarkable with exception of splenomegaly    - pain control and monitoring    - gen surg consulted. -GI consultation to look for any underlying liver disease.  CT scan of the abdomen was unremarkable except for hepatosplenomegaly and liver scan showed mild colitis shift. Jaxon Flavors his obesity Jodie Billing is a possibility.         #Splenomegaly    - has f/w hem/onc in past for this and undergone unrevealing work up including negative BMBX in 2018, SAMSON-2 negative, negative autoimmune work up, no diagnosed liver disease.  PET considered but was unable to be completed 2/2 pt weight    - Liver spleen scan pending    - gen surgery consult for consideration of splenectomy         #Leukopenia    - f/b hem/onc with unrevealing work up including NIRAV, rheumatoid factor to r/o Felty's syndrome, Copper, TSH, SPEP, LDH, haptoglobin    - suspected from hypersplenism         #RLE cellulitis    - completing course of clindamycin- continued on admit.   patient reports significant improvement since starting clinda         #Morbid Obesity    - Body mass index is 50.85 kg/m². - Complicating assessment and treatment.  Placing patient at risk for multiple co-morbidities as well as early death and contributing to the patient's presentation.    - Counseled on weight loss.              DVT Prophylaxis: SCD    Diet: DIET GENERAL;       Hematology note:    Splenomegaly    25 cm, noted on repeated CT scans    Stable in size on CT 2/2019, 8/12/2020    BMBX negative 12/2018    SAMSON-2 negative    Autoimmune workup negatve    PET unable to be done due to his weight    No known liver disease - will ask GI to assess since we are considering splenectomy to make sure to evidence of portal HTN or liver disease    Liver spleen scan shows (+) HSM, borderline colloid shift    Appreciate surgery input - they are considering splenectomy    Will also normal; no masses, hepatosplenomegaly is present    Neurologic: Grossly normal             Sodium: 135 (L)    Creatinine: 0.7 (L)    Glucose: 123 (H)    WBC: 1.4 (LL)    RBC: 2.79 (L)    RDW: 16.5 (H)    Neutrophils Absolute: 0.5 (LL)    Lymphocytes Absolute: 0.8 (L)    Atypical Lymphocytes Relative: 8 (H)    Nucleated Red Blood Cells: 2 (A)    Poikilocytes: Occasional (A)    Polychromasia: Occasional (A)    Anisocytosis: 1+ (A)    Hypochromia: Occasional (A)          Orders: general diet, NPO after midnight, neutropenic isolation, SCDS, up as tolerated           Anemia, Iron Deficiency or Unspecified - Care Day 1 (8/12/2020) by Chance Brizuela RN         Review Status  Review Entered    Completed  8/13/2020 14:36        Criteria Review       Care Day: 1 Care Date: 8/12/2020 Level of Care: Inpatient Floor    Guideline Day 1    Level Of Care    (X) ICU [D] or floor    8/13/2020 2:36 PM EDT by Sabrina Askew      med surg    Clinical Status    (X) * Clinical Indications met [E]    8/13/2020 2:36 PM EDT by Sabrina Askew      indications met    Activity    (X) Activity as tolerated    8/13/2020 2:36 PM EDT by Benita Baird up as tolerated    Routes    (X) Oral hydration, medications    8/13/2020 2:36 PM EDT by Sabrina Askew      Cleocin 300mg PO 4x daily  Cleocin 300mg PO x 1  Tylenol 650mg PO q6h PRN x 1    (X) Diet as tolerated    8/13/2020 2:36 PM EDT by Sabrina Askew      general diet    Interventions    (X) Hematologic evaluation [F]    8/13/2020 2:36 PM EDT by Benita Baird Hem/Onc consult    (X) Laboratory tests    8/13/2020 2:36 PM EDT by Benita Baird see labs below    (X) Possible transfusion    8/13/2020 2:36 PM EDT by Sabrina Askew      order to transfuse 1 unit PRBC    * Milestone    Additional Notes    8/12/2020    LOC - IP - med surg       Hematology consult:    Splenomegaly    25 cm, noted on repeated CT scans    Stable in size on CT 2/2019, 8/12/2020 BMBX negative 12/2018    SAMSON-2 negative    Autoimmune workup negatve    PET unable to be done due to his weight    No known liver disease    Check liver spleen scan    Had placed referral to general surgery outpatient for opinion on diagnostic/therapeutic splenectomy or biopsy - recommned asking them to see while admitted         Anemia    Hgb low normal at baseline    Was 10.7 in office yesterday, now 7.9    Check iron studies, hemolytic panel, B12, folate    Has had recent infection and been on abx    May need GI consult         Thrombocytopenia    Plt 27 in office yesterday    They were normal in the past and normal on repeat today    Suspect low plt count 8/11/2020 was lab error         Leukopenia    Noted to have chronic persistent leukopenia    Previously checked NIRAV, rheumatoid factor to r/o Felty's syndrome, Copper, TSH, SPEP, LDH, haptoglobin which were unremarkable    BMBX negative 12/2018    Suspected from hypersplenism    Ig levels normal       IM note:    abdominal pain.  Pain is located in the LUQ and also at the tip of his left shoulder.  Pain started last evening.  He describes it as a pressure aching sensation.  It is aggravated by taking a deep breath, movement and alleviated by nothing.  It was associated with nausea, but denies and vomiting or diarrhea.  He denies chest pain, cough, shortness of breath, fever or chills.  Denies bloody stools.  Drinks alcohol socially and chews tobacco, denies illicit drugs.  Takes NSAIDs PRN pain, has been taking them consistently x 2 weeks for cellulitis pain.  No history of PUD or GI Bleeding.           He has known splenomegaly and leukopenia for which he f/w Dr. Rand Hurley was last seen in 2018 .  At that time he underwent extensive work up including BMBX which was unremarkable.  A PET scan was ordered but unable to be completed 2/2 pt weight.  He was seen at [facility] ER 8/7/2020 for RLE cellulitis at which time CBC drawn showed leukopenia again, he was was unable to be completed 2/2 pt weight    - Liver spleen scan pending    - gen surgery consult for consideration of splenectomy vs biopsy         #Leukopenia    - f/b hem/onc with unrevealing work up including NIRAV, rheumatoid factor to r/o Felty's syndrome, Copper, TSH, SPEP, LDH, haptoglobin    - suspected from hypersplenism         #RLE cellulitis    - completing course of clindamycin- continued on admit.   patient reports significant improvement since starting clinda         #Morbid Obesity    - Body mass index is 50.85 kg/m². - Complicating assessment and treatment. Placing patient at risk for multiple co-morbidities as well as early death and contributing to the patient's presentation.    - Counseled on weight loss.              DVT Prophylaxis: SCD    Diet: DIET GENERAL;       General surgery consult:    ASSESSMENT:    Hypersplenism with chronic leukopenia and anemia    Intractable LUQ pain    Morbid obesity         PLAN:    Case discussed with Dr. Shayy Darnell opinion is that hematologic workup warrants splenectomy for diagnostic and therapeutic purposes.  Explained proposed operation to the patient and his mother. Dilan Go tentatively plan for 8/14/2020.   Will discuss vaccines with pharmacy tomorrow       Vitals: 98.0, , RR 20, /70, 94% on RA       Sodium: 134 (L)    Creatinine: 0.7 (L)    Glucose: 120 (H)    ALT: 43 (H)    AST: 48 (H)    Bilirubin: 0.9    WBC: 1.6 (LL)    RBC: 2.89 (L)    Hemoglobin Quant: 8.5 (L)    Hematocrit: 25.8 (L)    RDW: 16.4 (H)    Neutrophils Absolute: 0.6 (LL)    Lymphocytes Absolute: 0.7 (L)       Ferritin: 520.1 (H)    Iron: 35 (L)    Iron Saturation: 12 (L)    Soluble Transferrin Recept: 12.3 (H)    Immature Retic Fract: 0.67 (H)    Retic Ct Pct: 5.54 (H)          8/12/2020 13:40    Hemoglobin Quant: 8.4 (L)    Hematocrit: 25.5 (L)       8/12/2020 22:08    Hemoglobin Quant: 8.1 (L)    Hematocrit: 24.9 (L)       CT abdomen w/o contrast: 1.  Chronic pattern of splenomegaly with no focal abnormality.  2. There are no other significant findings in the abdomen or pelvis       NM liver/spleen scan: hepatosplenomegaly, borderline colloid shift       Meds:    NS 500ml bolus x 1    Pepcid 20mg IV x 1    Morphine 4mg IV x 3    Morphine 2mg IV q2h PRN x 1    Zofran 4mg IV q6h PRN x 2       Orders: general diet, GI consult, Hem/Onc consult, neutropenic isolation, SCDS, transfuse 1 unit RBC, up as tolerated           Anemia, Iron Deficiency or Unspecified - Clinical Indications for Admission to Inpatient Care by Jacklyn Allred RN         Review Status  Review Entered    Completed  8/13/2020 14:34        Criteria Review       Clinical Indications for Admission to Inpatient Care    Most Recent : Sunshine Chapman Most Recent Date: 8/13/2020 2:34 PM EDT    (X) Admission is indicated for  1 or more  of the following  (1) (2) (3) (4) (5) (6):       (X) Clinically significant signs or symptoms that are severe or persist despite emergency department       and observation care treatment (eg, transfusion, volume replacement) indicated by  1 or       more  of the following :          (X) Other severe sign or symptom secondary to anemia          8/13/2020 2:34 PM EDT by Sunshine Chapman            acute anemia - splenomegaly    Additional Notes    8/12/2020       ED provider note:       71-year-old male with history of splenomegaly, and new left upper quadrant pain, no obvious findings on CT, but he does have notable changes in his lab work, worsening leukocytopenia/neutropenia, worsening anemia, given the reported hemoglobin of 10.6 on yesterday's labs, and dropped from 8.5-7.9 here, decided to initiate blood transfusion, the platelets of 27 on yesterday's labs were not consistent with ours at 188, thus I repeated CBC, and 194 on repeat, thus I do not feel the platelet count was accurate before, he is not having any bleeding, denied melena or hematochezia, denied hematemesis, hemoptysis, hematuria, or any other source of bleeding, thus when I spoke to Dr. Willi Mckeon, he is concerned the spleen may be the cause and believe the patient warrants nonemergent surgical consultation since he will be admitted to the hospital for the anemia issue, patient was pale and in notable pain, given medication for pain/nausea, patient did request the clindamycin for his lower extremity cellulitis that he is being treated for, and states this does appear to be helping thus I did not change antibiotics, spoke to PA with hospitalist service for admission, patient verbalized understanding of plan to stay in the hospital

## 2020-10-20 PROBLEM — Z90.81 S/P SPLENECTOMY: Status: ACTIVE | Noted: 2020-10-20

## 2020-11-06 ENCOUNTER — HOSPITAL ENCOUNTER (OUTPATIENT)
Dept: NURSING | Age: 30
Setting detail: INFUSION SERIES
Discharge: HOME | End: 2020-11-06
Payer: COMMERCIAL

## 2020-11-06 VITALS
HEIGHT: 78 IN | TEMPERATURE: 97.6 F | SYSTOLIC BLOOD PRESSURE: 159 MMHG | WEIGHT: 315 LBS | DIASTOLIC BLOOD PRESSURE: 95 MMHG | RESPIRATION RATE: 16 BRPM | BODY MASS INDEX: 36.45 KG/M2 | HEART RATE: 95 BPM

## 2020-11-06 DIAGNOSIS — Z90.81 S/P SPLENECTOMY: ICD-10-CM

## 2020-11-06 PROCEDURE — G0009 ADMIN PNEUMOCOCCAL VACCINE: HCPCS | Performed by: SURGERY

## 2020-11-06 PROCEDURE — 99211 OFF/OP EST MAY X REQ PHY/QHP: CPT

## 2020-11-06 PROCEDURE — 90732 PPSV23 VACC 2 YRS+ SUBQ/IM: CPT | Performed by: SURGERY

## 2020-11-06 PROCEDURE — 6360000002 HC RX W HCPCS: Performed by: SURGERY

## 2020-11-06 PROCEDURE — 90471 IMMUNIZATION ADMIN: CPT

## 2020-11-06 RX ADMIN — PNEUMOCOCCAL VACCINE POLYVALENT 0.5 ML
25; 25; 25; 25; 25; 25; 25; 25; 25; 25; 25; 25; 25; 25; 25; 25; 25; 25; 25; 25; 25; 25; 25 INJECTION, SOLUTION INTRAMUSCULAR; SUBCUTANEOUS at 08:25

## 2020-11-06 ASSESSMENT — PAIN SCALES - GENERAL: PAINLEVEL_OUTOF10: 0

## 2020-11-06 NOTE — PROGRESS NOTES
Pt here for a pneumovac 23 injection  Pt received his first injection on 8-  Today is the 2nd dose for pneumovac Pt without any questions or concerns about the vaccine today  He reports no issues after the vaccine was given a couple months ago Prevnar 23 0.5 ml was given IM right deltoid  Pt jalil well  Site unremarkable and bandaid was applied  Discharge instructions reviewed with pt and copy was given Pt receptive to  information and verbalized understanding    Pt was discharged ambulatory in stable condition

## 2020-11-17 NOTE — TELEPHONE ENCOUNTER
Pt last visit was 12/13/18 with Babar Funk states that you know him and he has been tested positive for the covid. Thought that he needed to have his inhaler on hand if he truly needs it. Skinny Ruano

## 2020-11-18 ENCOUNTER — TELEPHONE (OUTPATIENT)
Dept: FAMILY MEDICINE CLINIC | Age: 30
End: 2020-11-18

## 2020-11-18 NOTE — TELEPHONE ENCOUNTER
Patient had called in about concern due to having asthma and testing positive for covid. He spoke to dr. Jef Wiseman and determined that he needed a new script for an inhaler. That was sent in to patients pharmacy. Dr. Jef Wiseman had me follow up with patients requests. Patient said he got the inhaler and had no other issues or questions at this time.

## 2021-07-19 NOTE — PROGRESS NOTES
Bedside report given to Lorrie Mohamud RN  pt in stable condition no needs at this time.  Call light within reach no

## 2021-09-21 ENCOUNTER — TELEPHONE (OUTPATIENT)
Dept: FAMILY MEDICINE CLINIC | Age: 31
End: 2021-09-21

## 2021-09-22 NOTE — PROGRESS NOTES
Illness began 3 days ago. Covid test negative. Patient still quite ill. Has been given omnicef medrol and phenergan by urgent care last Friday.  Will allow patient to be off work Wednesday Thursday Friday this week fax note to [facility]'s Office [phone]

## 2021-10-27 ENCOUNTER — TELEPHONE (OUTPATIENT)
Dept: FAMILY MEDICINE CLINIC | Age: 31
End: 2021-10-27

## 2021-10-27 NOTE — TELEPHONE ENCOUNTER
----- Message from [facility] sent at 10/27/2021  9:32 AM EDT -----  Subject: Message to Provider    QUESTIONS  Information for Provider? Pt has a spider bite that he would like a follow   up for with Dr. Anne-Marie Vance. The bite has already been drained and treated   but he wants to follow up. Dr. Anne-Marie Vance is his PCP but i can not schedule   because its listed as Maggie Ba as his PCP.   ---------------------------------------------------------------------------  --------------  Stuart SPANGLER  What is the best way for the office to contact you? OK to leave message on   voicemail  Preferred Call Back Phone Number? [phone]  ---------------------------------------------------------------------------  --------------  SCRIPT ANSWERS  Relationship to Patient?  Self

## 2021-10-31 ENCOUNTER — TELEPHONE (OUTPATIENT)
Dept: FAMILY MEDICINE CLINIC | Age: 31
End: 2021-10-31

## 2021-11-01 NOTE — PROGRESS NOTES
Please call this patient tomorrow morning with an appointment to be seen for this wound.  We can give him 11:00 a.m. and take the 11:20 a.m. appointment as well for him in case we have to do an incision and drainage

## 2021-11-01 NOTE — TELEPHONE ENCOUNTER
Advise patient he should set up an appointment with Dr. Johnson Ran evaluate his wound and clean it as necessary

## 2021-11-01 NOTE — TELEPHONE ENCOUNTER
Patient believes he has a spider bite on the his side. Was hospitalized. Wound is still draining and foul-smelling. Actually his mother reached out for an appointment. An appointment was offered this morning but patient stated that he could not attend because he had worked all night and he needed to sleep. Patient is now referred to general surgery for further evaluation and treatment of the wound.

## 2021-11-01 NOTE — TELEPHONE ENCOUNTER
Scheduled appointment called patient and he said that he just can't do that time today. He worked all night and he has to go in tonight needs to get some sleep.

## 2021-11-01 NOTE — TELEPHONE ENCOUNTER
Unable to reach patient, LVM informing that we recommend following up with dr. Nelson Edwards.  Informed to call back if he would like to go forward with referral and we can give him the referral information

## 2023-08-19 SDOH — ECONOMIC STABILITY: FOOD INSECURITY: WITHIN THE PAST 12 MONTHS, THE FOOD YOU BOUGHT JUST DIDN'T LAST AND YOU DIDN'T HAVE MONEY TO GET MORE.: SOMETIMES TRUE

## 2023-08-19 SDOH — ECONOMIC STABILITY: TRANSPORTATION INSECURITY
IN THE PAST 12 MONTHS, HAS LACK OF TRANSPORTATION KEPT YOU FROM MEETINGS, WORK, OR FROM GETTING THINGS NEEDED FOR DAILY LIVING?: NO

## 2023-08-19 SDOH — ECONOMIC STABILITY: FOOD INSECURITY: WITHIN THE PAST 12 MONTHS, YOU WORRIED THAT YOUR FOOD WOULD RUN OUT BEFORE YOU GOT MONEY TO BUY MORE.: SOMETIMES TRUE

## 2023-08-19 SDOH — ECONOMIC STABILITY: HOUSING INSECURITY
IN THE LAST 12 MONTHS, WAS THERE A TIME WHEN YOU DID NOT HAVE A STEADY PLACE TO SLEEP OR SLEPT IN A SHELTER (INCLUDING NOW)?: NO

## 2023-08-19 SDOH — ECONOMIC STABILITY: INCOME INSECURITY: HOW HARD IS IT FOR YOU TO PAY FOR THE VERY BASICS LIKE FOOD, HOUSING, MEDICAL CARE, AND HEATING?: SOMEWHAT HARD

## 2023-08-19 ASSESSMENT — PATIENT HEALTH QUESTIONNAIRE - PHQ9
4. FEELING TIRED OR HAVING LITTLE ENERGY: 3
1. LITTLE INTEREST OR PLEASURE IN DOING THINGS: 3
6. FEELING BAD ABOUT YOURSELF - OR THAT YOU ARE A FAILURE OR HAVE LET YOURSELF OR YOUR FAMILY DOWN: 2
5. POOR APPETITE OR OVEREATING: 3
1. LITTLE INTEREST OR PLEASURE IN DOING THINGS: NEARLY EVERY DAY
8. MOVING OR SPEAKING SO SLOWLY THAT OTHER PEOPLE COULD HAVE NOTICED. OR THE OPPOSITE - BEING SO FIDGETY OR RESTLESS THAT YOU HAVE BEEN MOVING AROUND A LOT MORE THAN USUAL: MORE THAN HALF THE DAYS
10. IF YOU CHECKED OFF ANY PROBLEMS, HOW DIFFICULT HAVE THESE PROBLEMS MADE IT FOR YOU TO DO YOUR WORK, TAKE CARE OF THINGS AT HOME, OR GET ALONG WITH OTHER PEOPLE: VERY DIFFICULT
7. TROUBLE CONCENTRATING ON THINGS, SUCH AS READING THE NEWSPAPER OR WATCHING TELEVISION: 2
SUM OF ALL RESPONSES TO PHQ QUESTIONS 1-9: 21
6. FEELING BAD ABOUT YOURSELF - OR THAT YOU ARE A FAILURE OR HAVE LET YOURSELF OR YOUR FAMILY DOWN: MORE THAN HALF THE DAYS
10. IF YOU CHECKED OFF ANY PROBLEMS, HOW DIFFICULT HAVE THESE PROBLEMS MADE IT FOR YOU TO DO YOUR WORK, TAKE CARE OF THINGS AT HOME, OR GET ALONG WITH OTHER PEOPLE: 2
2. FEELING DOWN, DEPRESSED OR HOPELESS: 3
7. TROUBLE CONCENTRATING ON THINGS, SUCH AS READING THE NEWSPAPER OR WATCHING TELEVISION: MORE THAN HALF THE DAYS
SUM OF ALL RESPONSES TO PHQ QUESTIONS 1-9: 21
8. MOVING OR SPEAKING SO SLOWLY THAT OTHER PEOPLE COULD HAVE NOTICED. OR THE OPPOSITE, BEING SO FIGETY OR RESTLESS THAT YOU HAVE BEEN MOVING AROUND A LOT MORE THAN USUAL: 2
9. THOUGHTS THAT YOU WOULD BE BETTER OFF DEAD, OR OF HURTING YOURSELF: 0
SUM OF ALL RESPONSES TO PHQ9 QUESTIONS 1 & 2: 6
5. POOR APPETITE OR OVEREATING: NEARLY EVERY DAY
SUM OF ALL RESPONSES TO PHQ QUESTIONS 1-9: 21
SUM OF ALL RESPONSES TO PHQ QUESTIONS 1-9: 21
9. THOUGHTS THAT YOU WOULD BE BETTER OFF DEAD, OR OF HURTING YOURSELF: NOT AT ALL
2. FEELING DOWN, DEPRESSED OR HOPELESS: NEARLY EVERY DAY
SUM OF ALL RESPONSES TO PHQ9 QUESTIONS 1 & 2: 6
4. FEELING TIRED OR HAVING LITTLE ENERGY: NEARLY EVERY DAY
3. TROUBLE FALLING OR STAYING ASLEEP: NEARLY EVERY DAY
3. TROUBLE FALLING OR STAYING ASLEEP: 3
SUM OF ALL RESPONSES TO PHQ QUESTIONS 1-9: 21

## 2023-08-19 ASSESSMENT — COLUMBIA-SUICIDE SEVERITY RATING SCALE - C-SSRS
2. IN THE PAST MONTH, HAVE YOU ACTUALLY HAD ANY THOUGHTS OF KILLING YOURSELF?: NO
1. IN THE PAST MONTH, HAVE YOU WISHED YOU WERE DEAD OR WISHED YOU COULD GO TO SLEEP AND NOT WAKE UP?: NO
6. IN YOUR LIFETIME, HAVE YOU EVER DONE ANYTHING, STARTED TO DO ANYTHING, OR PREPARED TO DO ANYTHING TO END YOUR LIFE?: NO

## 2023-08-21 ENCOUNTER — OFFICE VISIT (OUTPATIENT)
Dept: FAMILY MEDICINE CLINIC | Age: 33
End: 2023-08-21

## 2023-08-21 VITALS
DIASTOLIC BLOOD PRESSURE: 96 MMHG | WEIGHT: 315 LBS | SYSTOLIC BLOOD PRESSURE: 157 MMHG | BODY MASS INDEX: 55.12 KG/M2 | OXYGEN SATURATION: 97 % | HEART RATE: 92 BPM

## 2023-08-21 DIAGNOSIS — F41.8 SITUATIONAL ANXIETY: Primary | ICD-10-CM

## 2023-08-21 PROCEDURE — 99213 OFFICE O/P EST LOW 20 MIN: CPT | Performed by: FAMILY MEDICINE

## 2023-08-21 RX ORDER — CITALOPRAM 20 MG/1
20 TABLET ORAL DAILY
Qty: 30 TABLET | Refills: 5 | Status: SHIPPED | OUTPATIENT
Start: 2023-08-21

## 2023-08-21 RX ORDER — BUSPIRONE HYDROCHLORIDE 10 MG/1
10 TABLET ORAL 3 TIMES DAILY PRN
Qty: 90 TABLET | Refills: 5 | Status: SHIPPED | OUTPATIENT
Start: 2023-08-21 | End: 2024-02-17

## 2023-08-21 NOTE — PROGRESS NOTES
as scribed by the above signed scribe in my presence, and it is both accurate and complete. I agree with the ROS and Past Histories independently gathered by the clinical support staff and the remaining scribed note accurately describes my personal service to the patient.       8/21/2023    11:14 AM

## 2023-09-05 ENCOUNTER — TELEMEDICINE (OUTPATIENT)
Dept: FAMILY MEDICINE CLINIC | Age: 33
End: 2023-09-05
Payer: COMMERCIAL

## 2023-09-05 DIAGNOSIS — F41.8 SITUATIONAL ANXIETY: Primary | ICD-10-CM

## 2023-09-05 PROCEDURE — 99441 PR PHYS/QHP TELEPHONE EVALUATION 5-10 MIN: CPT | Performed by: FAMILY MEDICINE

## 2023-09-05 NOTE — PROGRESS NOTES
Ivan Newberry is a 34 y.o. male evaluated via telephone on 9/5/2023. Follow up on anxiety and depression. States his nerves are doing well. No concerns at this time. Internal Administration   First Dose COVID-19, PFIZER PURPLE top, DILUTE for use, (age 15 y+), 30mcg/0.3mL  04/01/2021   Second Dose           Last COVID Lab SARS-CoV-2, NAAT (no units)   Date Value   08/28/2020 Not Detected            Wt Readings from Last 3 Encounters:   08/21/23 (!) 477 lb (216.4 kg)   11/06/20 (!) 390 lb (176.9 kg)   08/28/20 (!) 440 lb (199.6 kg)     BP Readings from Last 3 Encounters:   08/21/23 (!) 157/96   11/06/20 (!) 159/95   08/28/20 (!) 156/93     Lab Results   Component Value Date    LABA1C 4.1 05/16/2020        ASSESSMENT PLAN      Diagnosis Orders   1. Situational anxiety        Patient has noticed improvement and that his heart does not seem to race as much he has less chest pain does not think about things quite as much. Has rarely used the BuSpar. Has checked his blood pressure and found numbers running [id number] and the diastolic typically under 90. Leave meds the same. When he follows up in a couple weeks we will recheck his blood pressure as well as again readdress his medications and condition. Patient should call the office immediately with new or ongoing signs or symptoms or worsening, or proceed to the emergency room. No changes in past medical history, past surgical history, social history, or family history were noted during the patient encounter unless specifically listed above. All updates of past medical history, past surgical history, social history, or family history were reviewed personally by me during the office visit. All problems listed in the assessment are stable unless noted otherwise. Medication profile reviewed personally by me during the visit. Medication side effects and possible impairments from medications were discussed as applicable.     Unless stated otherwise, we

## 2023-09-18 ENCOUNTER — OFFICE VISIT (OUTPATIENT)
Dept: FAMILY MEDICINE CLINIC | Age: 33
End: 2023-09-18

## 2023-09-18 VITALS
SYSTOLIC BLOOD PRESSURE: 144 MMHG | WEIGHT: 315 LBS | HEART RATE: 103 BPM | BODY MASS INDEX: 54.78 KG/M2 | OXYGEN SATURATION: 93 % | DIASTOLIC BLOOD PRESSURE: 97 MMHG

## 2023-09-18 DIAGNOSIS — Z90.81 S/P SPLENECTOMY: ICD-10-CM

## 2023-09-18 DIAGNOSIS — F41.8 SITUATIONAL ANXIETY: Primary | ICD-10-CM

## 2023-09-18 DIAGNOSIS — I10 UNCONTROLLED HYPERTENSION: ICD-10-CM

## 2023-09-18 DIAGNOSIS — I83.893 VARICOSE VEINS OF BOTH LEGS WITH EDEMA: ICD-10-CM

## 2023-09-18 DIAGNOSIS — Z13.29 SCREENING FOR THYROID DISORDER: ICD-10-CM

## 2023-09-18 DIAGNOSIS — I10 BENIGN ESSENTIAL HTN: ICD-10-CM

## 2023-09-18 DIAGNOSIS — H92.01 ACUTE OTALGIA, RIGHT: ICD-10-CM

## 2023-09-18 LAB
25(OH)D3 SERPL-MCNC: 22.9 NG/ML
ACANTHOCYTES BLD QL SMEAR: ABNORMAL
ALBUMIN SERPL-MCNC: 4.7 G/DL (ref 3.4–5)
ALBUMIN/GLOB SERPL: 1.4 {RATIO} (ref 1.1–2.2)
ALP SERPL-CCNC: 90 U/L (ref 40–129)
ALT SERPL-CCNC: 49 U/L (ref 10–40)
ANION GAP SERPL CALCULATED.3IONS-SCNC: 15 MMOL/L (ref 3–16)
AST SERPL-CCNC: 36 U/L (ref 15–37)
BASOPHILS # BLD: 0.1 K/UL (ref 0–0.2)
BASOPHILS NFR BLD: 1 %
BILIRUB SERPL-MCNC: 0.3 MG/DL (ref 0–1)
BUN SERPL-MCNC: 9 MG/DL (ref 7–20)
CALCIUM SERPL-MCNC: 9.5 MG/DL (ref 8.3–10.6)
CHLORIDE SERPL-SCNC: 100 MMOL/L (ref 99–110)
CHOLEST SERPL-MCNC: 252 MG/DL (ref 0–199)
CO2 SERPL-SCNC: 23 MMOL/L (ref 21–32)
CREAT SERPL-MCNC: 0.8 MG/DL (ref 0.9–1.3)
DEPRECATED RDW RBC AUTO: 14.6 % (ref 12.4–15.4)
EOSINOPHIL # BLD: 0.1 K/UL (ref 0–0.6)
EOSINOPHIL NFR BLD: 1 %
GFR SERPLBLD CREATININE-BSD FMLA CKD-EPI: >60 ML/MIN/{1.73_M2}
GLUCOSE SERPL-MCNC: 109 MG/DL (ref 70–99)
HCT VFR BLD AUTO: 51.1 % (ref 40.5–52.5)
HDLC SERPL-MCNC: 30 MG/DL (ref 40–60)
HGB BLD-MCNC: 16.9 G/DL (ref 13.5–17.5)
LDLC SERPL CALC-MCNC: 180 MG/DL
LYMPHOCYTES # BLD: 4.3 K/UL (ref 1–5.1)
LYMPHOCYTES NFR BLD: 47 %
MAGNESIUM SERPL-MCNC: 2.2 MG/DL (ref 1.8–2.4)
MCH RBC QN AUTO: 28.9 PG (ref 26–34)
MCHC RBC AUTO-ENTMCNC: 33.1 G/DL (ref 31–36)
MCV RBC AUTO: 87.1 FL (ref 80–100)
MONOCYTES # BLD: 1 K/UL (ref 0–1.3)
MONOCYTES NFR BLD: 12 %
NEUTROPHILS # BLD: 3.1 K/UL (ref 1.7–7.7)
NEUTROPHILS NFR BLD: 36 %
PLATELET # BLD AUTO: 410 K/UL (ref 135–450)
PLATELET BLD QL SMEAR: ABNORMAL
PMV BLD AUTO: 10.1 FL (ref 5–10.5)
POIKILOCYTOSIS BLD QL SMEAR: ABNORMAL
POTASSIUM SERPL-SCNC: 5.2 MMOL/L (ref 3.5–5.1)
PROT SERPL-MCNC: 8.1 G/DL (ref 6.4–8.2)
RBC # BLD AUTO: 5.86 M/UL (ref 4.2–5.9)
SLIDE REVIEW: ABNORMAL
SODIUM SERPL-SCNC: 138 MMOL/L (ref 136–145)
TRIGL SERPL-MCNC: 209 MG/DL (ref 0–150)
TSH SERPL DL<=0.005 MIU/L-ACNC: 0.54 UIU/ML (ref 0.27–4.2)
VARIANT LYMPHS NFR BLD MANUAL: 3 % (ref 0–6)
VLDLC SERPL CALC-MCNC: 42 MG/DL
WBC # BLD AUTO: 8.6 K/UL (ref 4–11)

## 2023-09-18 PROCEDURE — 99214 OFFICE O/P EST MOD 30 MIN: CPT | Performed by: FAMILY MEDICINE

## 2023-09-18 PROCEDURE — 36415 COLL VENOUS BLD VENIPUNCTURE: CPT | Performed by: FAMILY MEDICINE

## 2023-09-18 PROCEDURE — 3080F DIAST BP >= 90 MM HG: CPT | Performed by: FAMILY MEDICINE

## 2023-09-18 PROCEDURE — 3077F SYST BP >= 140 MM HG: CPT | Performed by: FAMILY MEDICINE

## 2023-09-18 RX ORDER — TRIAMTERENE AND HYDROCHLOROTHIAZIDE 37.5; 25 MG/1; MG/1
1 TABLET ORAL DAILY
Qty: 90 TABLET | Refills: 0 | Status: SHIPPED | OUTPATIENT
Start: 2023-09-18

## 2023-09-18 NOTE — PROGRESS NOTES
Ximena RN on 9/18/2023 at 9:12 AM      Provider attestation:     I, Dr. Vivi Rosales, personally performed the services described in this documentation, as scribed by the above signed scribe in my presence, and it is both accurate and complete. I agree with the ROS and Past Histories independently gathered by the clinical support staff and the remaining scribed note accurately describes my personal service to the patient.       9/18/2023    11:43 AM

## 2023-09-19 DIAGNOSIS — R73.9 HYPERGLYCEMIA: Primary | ICD-10-CM

## 2023-09-19 LAB
EST. AVERAGE GLUCOSE BLD GHB EST-MCNC: 119.8 MG/DL
HBA1C MFR BLD: 5.8 %

## 2023-09-19 PROCEDURE — 36415 COLL VENOUS BLD VENIPUNCTURE: CPT | Performed by: FAMILY MEDICINE

## 2023-09-20 ENCOUNTER — NURSE ONLY (OUTPATIENT)
Dept: FAMILY MEDICINE CLINIC | Age: 33
End: 2023-09-20

## 2023-09-20 DIAGNOSIS — E78.2 MIXED HYPERLIPIDEMIA: Primary | ICD-10-CM

## 2023-09-20 DIAGNOSIS — E87.5 SERUM POTASSIUM ELEVATED: Primary | ICD-10-CM

## 2023-09-20 PROCEDURE — 36415 COLL VENOUS BLD VENIPUNCTURE: CPT | Performed by: FAMILY MEDICINE

## 2023-09-20 RX ORDER — ROSUVASTATIN CALCIUM 10 MG/1
10 TABLET, COATED ORAL DAILY
Qty: 30 TABLET | Refills: 2 | Status: SHIPPED | OUTPATIENT
Start: 2023-09-20

## 2023-09-20 RX ORDER — ROSUVASTATIN CALCIUM 10 MG/1
10 TABLET, COATED ORAL DAILY
Qty: 30 TABLET | Refills: 2 | Status: SHIPPED | OUTPATIENT
Start: 2023-09-20 | End: 2023-09-20

## 2023-09-21 LAB — POTASSIUM SERPL-SCNC: 4.8 MMOL/L (ref 3.5–5.1)

## 2023-10-24 ENCOUNTER — OFFICE VISIT (OUTPATIENT)
Dept: FAMILY MEDICINE CLINIC | Age: 33
End: 2023-10-24

## 2023-10-24 VITALS
WEIGHT: 315 LBS | SYSTOLIC BLOOD PRESSURE: 144 MMHG | DIASTOLIC BLOOD PRESSURE: 86 MMHG | BODY MASS INDEX: 55.01 KG/M2 | HEART RATE: 106 BPM

## 2023-10-24 DIAGNOSIS — R06.83 SNORING: ICD-10-CM

## 2023-10-24 DIAGNOSIS — I83.893 VARICOSE VEINS OF BOTH LEGS WITH EDEMA: ICD-10-CM

## 2023-10-24 DIAGNOSIS — I10 PRIMARY HYPERTENSION: ICD-10-CM

## 2023-10-24 DIAGNOSIS — Z90.81 S/P SPLENECTOMY: ICD-10-CM

## 2023-10-24 DIAGNOSIS — F41.8 SITUATIONAL ANXIETY: Primary | ICD-10-CM

## 2023-10-24 PROCEDURE — 3079F DIAST BP 80-89 MM HG: CPT | Performed by: FAMILY MEDICINE

## 2023-10-24 PROCEDURE — 99214 OFFICE O/P EST MOD 30 MIN: CPT | Performed by: FAMILY MEDICINE

## 2023-10-24 PROCEDURE — 3077F SYST BP >= 140 MM HG: CPT | Performed by: FAMILY MEDICINE

## 2023-10-24 NOTE — PROGRESS NOTES
of both legs with edema        5. Primary hypertension        Patient felt like the Celexa made him have headache and worsening dreams so he stopped it. Is getting along okay still having counseling. We reviewed the vaccines that he would be due for since he is postsplenectomy he did not want the flu shot and will check into getting the meningitis B vaccine at the health department. He will have insurance soon and wanted referral for sleep study. He does snore a lot. Referral given. Alberto Malcolm this may help his blood pressure as well. His leg swelling is improved with the Dyazide and he is continue that as well as he is continued his lipid medicine. Current blood pressure readings in the office or at home are acceptable and current antihypertensive medications as listed in the medication list require no change. Okay at this point we may hold at the dose of Dyazide he is on and then see if treatment of sleep apnea further reduces his blood pressure. Follow-up 3 months    Patient should call the office immediately with new or ongoing signs or symptoms or worsening, or proceed to the emergency room. No changes in past medical history, past surgical history, social history, or family history were noted during the patient encounter unless specifically listed above. All updates of past medical history, past surgical history, social history, or family history were reviewed personally by me during the office visit. All problems listed in the assessment are stable unless noted otherwise. Medication profile reviewed personally by me during the visit. Medication side effects and possible impairments from medications were discussed as applicable. Unless stated otherwise, we will continue current medications as listed in the medication review report contained in the patient's medical record. This document was prepared by a combination of typing and transcription through a voice recognition software.

## 2023-11-03 ENCOUNTER — TELEPHONE (OUTPATIENT)
Dept: FAMILY MEDICINE CLINIC | Age: 33
End: 2023-11-03

## 2023-11-03 DIAGNOSIS — I10 BENIGN ESSENTIAL HTN: Primary | ICD-10-CM

## 2023-11-06 ENCOUNTER — OFFICE VISIT (OUTPATIENT)
Dept: FAMILY MEDICINE CLINIC | Age: 33
End: 2023-11-06

## 2023-11-06 VITALS
DIASTOLIC BLOOD PRESSURE: 98 MMHG | OXYGEN SATURATION: 91 % | WEIGHT: 315 LBS | BODY MASS INDEX: 56.16 KG/M2 | HEART RATE: 103 BPM | SYSTOLIC BLOOD PRESSURE: 149 MMHG

## 2023-11-06 DIAGNOSIS — I10 PRIMARY HYPERTENSION: ICD-10-CM

## 2023-11-06 DIAGNOSIS — I10 UNCONTROLLED HYPERTENSION: Primary | ICD-10-CM

## 2023-11-06 PROCEDURE — 3077F SYST BP >= 140 MM HG: CPT | Performed by: FAMILY MEDICINE

## 2023-11-06 PROCEDURE — 3080F DIAST BP >= 90 MM HG: CPT | Performed by: FAMILY MEDICINE

## 2023-11-06 PROCEDURE — 99212 OFFICE O/P EST SF 10 MIN: CPT | Performed by: FAMILY MEDICINE

## 2023-11-06 RX ORDER — TRIAMTERENE AND HYDROCHLOROTHIAZIDE 75; 50 MG/1; MG/1
1 TABLET ORAL DAILY
Qty: 90 TABLET | Refills: 3 | Status: SHIPPED | OUTPATIENT
Start: 2023-11-06

## 2023-11-06 NOTE — PATIENT INSTRUCTIONS

## 2023-11-09 ENCOUNTER — TELEPHONE (OUTPATIENT)
Dept: FAMILY MEDICINE CLINIC | Age: 33
End: 2023-11-09

## 2023-11-09 NOTE — TELEPHONE ENCOUNTER
----- Message from Flaquita Anderson sent at 11/7/2023  1:55 PM EST -----  Subject: Message to Provider    QUESTIONS  Information for Provider? Pt would like contacted to see if paperwork for   physical stress test papers have been emailed to [email or web address]  ---------------------------------------------------------------------------  --------------  Yarelis [place] Carlos  [phone]; OK to leave message on voicemail  ---------------------------------------------------------------------------  --------------  SCRIPT ANSWERS  Relationship to Patient?  Self

## 2023-11-13 ENCOUNTER — TELEPHONE (OUTPATIENT)
Dept: FAMILY MEDICINE CLINIC | Age: 33
End: 2023-11-13

## 2023-11-13 DIAGNOSIS — I10 UNCONTROLLED HYPERTENSION: Primary | ICD-10-CM

## 2023-11-13 NOTE — TELEPHONE ENCOUNTER
Pt called and requested a new order for the stress test be ordered. States his  requested a new stress test to prove his BP is not what they said it was during his last stress test. I did not order or pend on because I wasn't sure which one you would like ordered. Please call patient and let him know once ordered.

## 2023-11-14 ENCOUNTER — TELEPHONE (OUTPATIENT)
Dept: FAMILY MEDICINE CLINIC | Age: 33
End: 2023-11-14

## 2023-11-14 DIAGNOSIS — I10 UNCONTROLLED HYPERTENSION: Primary | ICD-10-CM

## 2023-11-14 NOTE — TELEPHONE ENCOUNTER
Nila Medina form [employer] calling to state that they are unable to do the plain stress due to his weight.   They can do a chemical stress test if that's ok Nila Medina call back number [phone]

## 2023-12-26 DIAGNOSIS — E78.2 MIXED HYPERLIPIDEMIA: ICD-10-CM

## 2023-12-26 RX ORDER — ROSUVASTATIN CALCIUM 10 MG/1
10 TABLET, COATED ORAL DAILY
Qty: 30 TABLET | Refills: 1 | Status: SHIPPED | OUTPATIENT
Start: 2023-12-26

## 2023-12-26 NOTE — TELEPHONE ENCOUNTER
Refill Request     CONFIRM preferrred pharmacy with the patient. If Mail Order Rx - Pend for 90 day refill. Last Seen: Last Seen Department: 11/6/2023  Last Seen by PCP: 11/6/2023    Last Written: 09/03/2023    If no future appointment scheduled, route STAFF MESSAGE with patient name to the [facility] for scheduling. Next Appointment:   Future Appointments   Date Time Provider [address] [place] [address]   1/3/2024  1:30 PM [facility]   1/24/2024  1:00 PM Sully Meza MD [place] [address]       Message sent to [address] [place] [address] to schedule appt with patient?   NO      Requested Prescriptions     Pending Prescriptions Disp Refills    rosuvastatin (CRESTOR) 10 MG tablet [Pharmacy Med Name: ROSUVASTATIN 10MG] 30 tablet 1     Sig: TAKE 1 TABLET BY MOUTH DAILY

## 2024-01-03 ENCOUNTER — OFFICE VISIT (OUTPATIENT)
Dept: PULMONOLOGY | Age: 34
End: 2024-01-03
Payer: COMMERCIAL

## 2024-01-03 VITALS
WEIGHT: 315 LBS | HEIGHT: 78 IN | DIASTOLIC BLOOD PRESSURE: 70 MMHG | RESPIRATION RATE: 18 BRPM | SYSTOLIC BLOOD PRESSURE: 142 MMHG | HEART RATE: 96 BPM | OXYGEN SATURATION: 96 % | BODY MASS INDEX: 36.45 KG/M2

## 2024-01-03 DIAGNOSIS — R06.83 SNORING: ICD-10-CM

## 2024-01-03 DIAGNOSIS — R06.81 WITNESSED APNEIC SPELLS: Primary | ICD-10-CM

## 2024-01-03 DIAGNOSIS — G25.81 RLS (RESTLESS LEGS SYNDROME): ICD-10-CM

## 2024-01-03 PROBLEM — I10 PRIMARY HYPERTENSION: Chronic | Status: ACTIVE | Noted: 2023-10-24

## 2024-01-03 PROBLEM — R07.9 CHEST PAIN: Status: RESOLVED | Noted: 2020-05-16 | Resolved: 2024-01-03

## 2024-01-03 PROBLEM — R10.12 ABDOMINAL PAIN, LEFT UPPER QUADRANT: Status: RESOLVED | Noted: 2020-08-12 | Resolved: 2024-01-03

## 2024-01-03 PROCEDURE — G8427 DOCREV CUR MEDS BY ELIG CLIN: HCPCS

## 2024-01-03 PROCEDURE — G8417 CALC BMI ABV UP PARAM F/U: HCPCS

## 2024-01-03 PROCEDURE — 3078F DIAST BP <80 MM HG: CPT

## 2024-01-03 PROCEDURE — G8484 FLU IMMUNIZE NO ADMIN: HCPCS

## 2024-01-03 PROCEDURE — 99243 OFF/OP CNSLTJ NEW/EST LOW 30: CPT

## 2024-01-03 PROCEDURE — 3077F SYST BP >= 140 MM HG: CPT

## 2024-01-03 ASSESSMENT — SLEEP AND FATIGUE QUESTIONNAIRES
HOW LIKELY ARE YOU TO NOD OFF OR FALL ASLEEP WHILE SITTING QUIETLY AFTER LUNCH WITHOUT ALCOHOL: 2
HOW LIKELY ARE YOU TO NOD OFF OR FALL ASLEEP WHILE SITTING AND READING: 3
HOW LIKELY ARE YOU TO NOD OFF OR FALL ASLEEP WHILE WATCHING TV: 2
HOW LIKELY ARE YOU TO NOD OFF OR FALL ASLEEP WHILE SITTING INACTIVE IN A PUBLIC PLACE: 0
ESS TOTAL SCORE: 10
HOW LIKELY ARE YOU TO NOD OFF OR FALL ASLEEP WHILE SITTING AND TALKING TO SOMEONE: 0
NECK CIRCUMFERENCE (INCHES): 20
HOW LIKELY ARE YOU TO NOD OFF OR FALL ASLEEP WHEN YOU ARE A PASSENGER IN A CAR FOR AN HOUR WITHOUT A BREAK: 0
HOW LIKELY ARE YOU TO NOD OFF OR FALL ASLEEP WHILE LYING DOWN TO REST IN THE AFTERNOON WHEN CIRCUMSTANCES PERMIT: 3
HOW LIKELY ARE YOU TO NOD OFF OR FALL ASLEEP IN A CAR, WHILE STOPPED FOR A FEW MINUTES IN TRAFFIC: 0

## 2024-01-03 NOTE — PATIENT INSTRUCTIONS
What's next:  Schedule a study with the sleep lab (call them at [phone] if you do not receive their call.)    Read through the sleep hygiene tips below to see what kind of changes you can start working on in the meantime, while awaiting your sleep study.    We will meet after your sleep study to discuss results, options and recommendations from there.       It was great to meet you and take care Elton!  -Christine    ______________________________________________________________________  Never drive a car or operate a motorized vehicle while drowsy or sleepy.   ______________________________________________________________________        Sleep Hygiene... Important practices for better sleep:    Avoid naps. This will ensure you are sleepy at bedtime.  If you have to take a nap, sleep less than 1 hour, before 3 pm.  SCHEDULE: Have a fixed bedtime and awakening time. The human body thrives on routines. Only deviate from these set sleep times about 1-2 hours on the weekends (more than this will start altering your internal clock). You will feel better keeping a regular sleep cycle and giving your body a dependable pattern, even (especially) if you are retired or not working.   Use light to train your biological clock: When you get up in the morning, exposure yourself to bright lights. When preparing for bed, dim the lights and avoid exposure to screens.  The blue light from electronic screens tells our brain that it is time to be awake; it inhibits melatonin production which stops our brain from helping us get to sleep.   Go to bed only when sleepy; this reduces the time you are awake in bed (which can lead to frustration and negative thoughts about sleep). If you can't fall asleep within 15-30 minutes, get up and do something boring until you feel sleepy again. Sit quietly in the dark or read the warranty on your refrigerator. Don't expose yourself to bright light during this time (especially screens), which would

## 2024-01-03 NOTE — PROGRESS NOTES
Surgical History:   Procedure Laterality Date    SPLENECTOMY N/A 8/14/2020    SPLENECTOMY, LIVER BIOPSY performed by Josh Saleh MD at [facility] OR    SPLENECTOMY, TOTAL  08/14/2020    SPLENECTOMY, LIVER BIOPSY    TYMPANOSTOMY TUBE PLACEMENT       Allergies   Allergen Reactions    Celexa [Citalopram] Headaches     Migraines and vivid dreams    Contrast [Iodides]      Medication list was reviewed and updated as needed in Epic.    family history is not on file.    Review of Systems: Complete Review of system reviewed with patient and noted on attached review of system sheet.     PHYSICAL EXAM:  Blood pressure (!) 142/70, pulse 96, resp. rate 18, height 2.007 m (6' 7\"), weight (!) 213 kg (469 lb 9.6 oz), SpO2 96 %.'   469# Jan 2024;   Constitutional:  No acute distress.   HENT:  Oropharynx is clear and moist. Mallampati class III  Eyes: Pupils equal, round, and reactive to light. No scleral icterus.   Neck: No tracheal deviation present.  Circumference is 20 inches   Cardiovascular: Normal heart sounds.  No lower extremity edema.  Pulmonary/Chest: Clear breath sounds. No wheezes. No rhonchi. No rales. No decreased breath sounds.  No accessory muscle usage.   Musculoskeletal: No cyanosis. No clubbing.  Skin: Skin is warm and dry.   Psychiatric: Normal mood and affect.    DATA:  Review of PCP records    ASSESSMENT:  Severe & frequent witnessed apneas  Excessive daytime sleepiness  Severe snoring  RLS  Asthma since childhood, mild intermittent   Comorbid conditions: Obesity, HTN, HLD,   Never smoker    PLAN:   Await to hear pt's DME choice.   Sleep hygiene tips discussed & provided  Specifically cautioned: absolutely no driving motorized vehicles or operating heavy machinery while fatigued, drowsy or sleepy   Weight loss is recommended as a long-term intervention   Pathophysiology & complications of untreated KIRT were discussed to include systemic HTN, pulmonary HTN, CV morbidities, car accidents & all-cause

## 2024-01-07 ENCOUNTER — HOSPITAL ENCOUNTER (OUTPATIENT)
Dept: SLEEP CENTER | Age: 34
Discharge: HOME | End: 2024-01-09
Payer: COMMERCIAL

## 2024-01-07 DIAGNOSIS — R06.81 WITNESSED APNEIC SPELLS: ICD-10-CM

## 2024-01-07 DIAGNOSIS — R06.83 SNORING: ICD-10-CM

## 2024-01-07 PROCEDURE — 95806 SLEEP STUDY UNATT&RESP EFFT: CPT

## 2024-01-29 PROBLEM — R06.83 SNORING: Status: ACTIVE | Noted: 2024-01-29

## 2024-02-09 ENCOUNTER — OFFICE VISIT (OUTPATIENT)
Dept: PULMONOLOGY | Age: 34
End: 2024-02-09
Payer: COMMERCIAL

## 2024-02-09 ENCOUNTER — TELEPHONE (OUTPATIENT)
Dept: PULMONOLOGY | Age: 34
End: 2024-02-09

## 2024-02-09 VITALS
OXYGEN SATURATION: 95 % | SYSTOLIC BLOOD PRESSURE: 142 MMHG | RESPIRATION RATE: 17 BRPM | HEIGHT: 78 IN | BODY MASS INDEX: 36.45 KG/M2 | HEART RATE: 96 BPM | DIASTOLIC BLOOD PRESSURE: 80 MMHG | WEIGHT: 315 LBS

## 2024-02-09 DIAGNOSIS — G47.33 SEVERE OBSTRUCTIVE SLEEP APNEA: Primary | ICD-10-CM

## 2024-02-09 DIAGNOSIS — G47.19 EXCESSIVE DAYTIME SLEEPINESS: ICD-10-CM

## 2024-02-09 PROBLEM — R06.83 SNORING: Status: RESOLVED | Noted: 2024-01-29 | Resolved: 2024-02-09

## 2024-02-09 PROBLEM — Z90.81 S/P SPLENECTOMY: Chronic | Status: ACTIVE | Noted: 2020-10-20

## 2024-02-09 PROCEDURE — 4004F PT TOBACCO SCREEN RCVD TLK: CPT

## 2024-02-09 PROCEDURE — G8417 CALC BMI ABV UP PARAM F/U: HCPCS

## 2024-02-09 PROCEDURE — G8427 DOCREV CUR MEDS BY ELIG CLIN: HCPCS

## 2024-02-09 PROCEDURE — 3077F SYST BP >= 140 MM HG: CPT

## 2024-02-09 PROCEDURE — 3079F DIAST BP 80-89 MM HG: CPT

## 2024-02-09 PROCEDURE — 99213 OFFICE O/P EST LOW 20 MIN: CPT

## 2024-02-09 PROCEDURE — G8484 FLU IMMUNIZE NO ADMIN: HCPCS

## 2024-02-09 ASSESSMENT — SLEEP AND FATIGUE QUESTIONNAIRES
HOW LIKELY ARE YOU TO NOD OFF OR FALL ASLEEP IN A CAR, WHILE STOPPED FOR A FEW MINUTES IN TRAFFIC: 0
HOW LIKELY ARE YOU TO NOD OFF OR FALL ASLEEP WHEN YOU ARE A PASSENGER IN A CAR FOR AN HOUR WITHOUT A BREAK: 0
HOW LIKELY ARE YOU TO NOD OFF OR FALL ASLEEP WHILE SITTING AND READING: 2
HOW LIKELY ARE YOU TO NOD OFF OR FALL ASLEEP WHILE SITTING INACTIVE IN A PUBLIC PLACE: 0
HOW LIKELY ARE YOU TO NOD OFF OR FALL ASLEEP WHILE WATCHING TV: 2
NECK CIRCUMFERENCE (INCHES): 19
HOW LIKELY ARE YOU TO NOD OFF OR FALL ASLEEP WHILE LYING DOWN TO REST IN THE AFTERNOON WHEN CIRCUMSTANCES PERMIT: 3
HOW LIKELY ARE YOU TO NOD OFF OR FALL ASLEEP WHILE SITTING QUIETLY AFTER LUNCH WITHOUT ALCOHOL: 0
HOW LIKELY ARE YOU TO NOD OFF OR FALL ASLEEP WHILE SITTING AND TALKING TO SOMEONE: 0
ESS TOTAL SCORE: 7

## 2024-02-09 NOTE — PROGRESS NOTES
pulse 96, resp. rate 17, height 2.007 m (6' 7\"), weight (!) 212.2 kg (467 lb 12.8 oz), SpO2 95 %.'   469# Jan 2024;   Constitutional:  No acute distress.  Very pleasant.   HENT:  Oropharynx is clear and moist. Mallampati class III  Eyes: Pupils equal, round, and reactive to light. No scleral icterus.   Neck: No tracheal deviation present.  Circumference is 20 inches   Cardiovascular: Normal heart sounds.  No lower extremity edema.  Pulmonary/Chest: Clear breath sounds. No wheezes. No rhonchi. No rales. No decreased breath sounds.  No accessory muscle usage.   Musculoskeletal: No cyanosis. No clubbing.  Skin: Skin is warm and dry.   Psychiatric: Normal mood and affect.    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  Severe & frequent witnessed apneas  Excessive daytime sleepiness  Severe snoring  RLS  Asthma since childhood, mild intermittent   Comorbid conditions: Obesity, HTN, HLD,   Never smoker    PLAN:   APAP 5-15 - [facility]  priority set up  Sleep hygiene tips discussed & provided  Specifically cautioned: absolutely no driving motorized vehicles or operating heavy machinery while fatigued, drowsy or sleepy   Weight loss is recommended as a long-term intervention   Pathophysiology & complications of untreated KIRT were discussed to include systemic HTN, pulmonary HTN, CV morbidities, car accidents & all-cause mortality. Systemic benefits of CPAP therapy have been discussed.   F/U 31-90

## 2024-02-09 NOTE — PATIENT INSTRUCTIONS
What's next:  Trying auto-CPAP:  We will send your orders to [email or web address], (\"DME company\").  That DME company will get you set up with your machine and equipment.    After getting home with your machine, I recommend trying to de-sensitize yourself to the CPAP & mask--people often acclimate better if they try it out during the day and practice breathing with it while focusing on another task.  You can ask your DME for a different mask if what you first tried isn't comfortable.  Also, feel free to call the office if the air pressures are not tolerable--I can send in an order for them to be changed, while keeping your treatment efficacy in mind.   After getting set up with CPAP, you come back and see me within 31-90 days.  At this appointment, insurance typically needs to see that you are using the device at least 4 hours each night for at least 70% of nights in a month.  Please bring your machine and power cord to this appointment.     It was great to see you again and take care Elton!  Va      ______________________________________________________________________  Never drive a car or operate a motorized vehicle while drowsy or sleepy.   ______________________________________________________________________        Sleep Hygiene... Important practices for better sleep:    Avoid naps. This will ensure you are sleepy at bedtime.  If you have to take a nap, sleep less than 1 hour, before 3 pm.  SCHEDULE: Have a fixed bedtime and awakening time. The human body thrives on routines. Only deviate from these set sleep times about 1-2 hours on the weekends (more than this will start altering your internal clock). You will feel better keeping a regular sleep cycle and giving your body a dependable pattern, even (especially) if you are retired or not working.   Use light to train your biological clock: When you get up in the morning, exposure yourself to bright lights. When preparing for bed, dim the lights and

## 2024-02-09 NOTE — TELEPHONE ENCOUNTER
Faxed cpap order, nasal mask order, nasal pillows mask order, full face mask order, HST, and ov notes as a high priority setup to Stacie [facility]ABELL[facility] at [phone] via [employer].

## 2024-02-29 NOTE — PATIENT INSTRUCTIONS
Not feeling your best?  Where to go for the right care at the right time.    Dear Elton Ross II   I wanted to provide you with some information that might help you seek care for your condition when your primary care provider or specialist is unavailable. If you have a need outside of normal business hours, you should first contact your primary care office or specialist caring for your condition. They may have on-call providers that could assist with your care. During office hours, you may request a virtual or same day appointment.   But what if your primary care provider is not in the office that day and you can't wait until the  next day for care? In that situation, your next option is to visit an urgent care facility.          [facility] now has urgent care sites open to support our community.   [facility] is a great alternative when you need immediate medical care that is not a serious threat to your health or your doctor's office is closed or unable to get you in for an appointment. The urgent care centers offer fast access to [facility] doctors for minor illnesses and injuries for patients of all ages. There are other medical services available including lab testing, X-rays, EKGs, and IV fluids.  Locations are open daily from 8 a.m. - 8 p.m.     [facility]  [address], [place], [place] [address]  [phone]    [address], [place], [place] [address]  [phone]    Local Urgent Care     [facility] 8:30 am - 7:70 pm   [address] [place] [address]  [phone]    [facility]     8 am - 8 pm   [address] [address]  [phone]    [facility] [place] 7 am - 7:30 pm  [address] [place] [address]  [phone]     [facility] [place] 7 am - 7:30 pm  [address] [place] [address]  [phone]    [place]

## 2024-03-01 ENCOUNTER — OFFICE VISIT (OUTPATIENT)
Dept: FAMILY MEDICINE CLINIC | Age: 34
End: 2024-03-01

## 2024-03-01 VITALS
OXYGEN SATURATION: 95 % | HEART RATE: 85 BPM | DIASTOLIC BLOOD PRESSURE: 80 MMHG | HEIGHT: 78 IN | BODY MASS INDEX: 36.45 KG/M2 | WEIGHT: 315 LBS | SYSTOLIC BLOOD PRESSURE: 130 MMHG

## 2024-03-01 DIAGNOSIS — R05.1 ACUTE COUGH: ICD-10-CM

## 2024-03-01 DIAGNOSIS — R09.81 CHRONIC NASAL CONGESTION: ICD-10-CM

## 2024-03-01 DIAGNOSIS — J20.9 ACUTE BRONCHITIS DUE TO INFECTION: Primary | ICD-10-CM

## 2024-03-01 DIAGNOSIS — E78.2 MIXED HYPERLIPIDEMIA: ICD-10-CM

## 2024-03-01 LAB
INFLUENZA A ANTIBODY: NORMAL
INFLUENZA B ANTIBODY: NORMAL
Lab: 0
QC PASS/FAIL: NORMAL
S PYO AG THROAT QL: NORMAL
SARS-COV-2 RDRP RESP QL NAA+PROBE: NEGATIVE

## 2024-03-01 RX ORDER — DEXTROMETHORPHAN HYDROBROMIDE AND PROMETHAZINE HYDROCHLORIDE 15; 6.25 MG/5ML; MG/5ML
5 SYRUP ORAL 4 TIMES DAILY PRN
Qty: 240 ML | Refills: 0 | Status: SHIPPED | OUTPATIENT
Start: 2024-03-01

## 2024-03-01 RX ORDER — METHYLPREDNISOLONE 4 MG/1
TABLET ORAL
Qty: 1 KIT | Refills: 0 | Status: SHIPPED | OUTPATIENT
Start: 2024-03-01

## 2024-03-01 RX ORDER — ROSUVASTATIN CALCIUM 10 MG/1
10 TABLET, COATED ORAL DAILY
Qty: 30 TABLET | Refills: 0 | Status: SHIPPED | OUTPATIENT
Start: 2024-03-01

## 2024-03-01 RX ORDER — DOXYCYCLINE HYCLATE 100 MG
100 TABLET ORAL 2 TIMES DAILY
Qty: 14 TABLET | Refills: 0 | Status: SHIPPED | OUTPATIENT
Start: 2024-03-01 | End: 2024-03-08

## 2024-03-01 RX ORDER — BUDESONIDE 0.5 MG/2ML
0.5 INHALANT ORAL ONCE
Status: COMPLETED | OUTPATIENT
Start: 2024-03-01 | End: 2024-03-01

## 2024-03-01 RX ORDER — ALBUTEROL SULFATE 2.5 MG/3ML
2.5 SOLUTION RESPIRATORY (INHALATION) ONCE
Status: COMPLETED | OUTPATIENT
Start: 2024-03-01 | End: 2024-03-01

## 2024-03-01 RX ADMIN — ALBUTEROL SULFATE 2.5 MG: 2.5 SOLUTION RESPIRATORY (INHALATION) at 14:25

## 2024-03-01 RX ADMIN — BUDESONIDE 500 MCG: 0.5 INHALANT ORAL at 14:26

## 2024-03-01 ASSESSMENT — ENCOUNTER SYMPTOMS
CHOKING: 0
RHINORRHEA: 1
COUGH: 1
VOICE CHANGE: 1
SORE THROAT: 1
SWOLLEN GLANDS: 0
STRIDOR: 0
SINUS PRESSURE: 0
SHORTNESS OF BREATH: 0
CHEST TIGHTNESS: 1
SINUS PAIN: 0
NAUSEA: 0
TROUBLE SWALLOWING: 0
VOMITING: 0
APNEA: 0
GASTROINTESTINAL NEGATIVE: 1
WHEEZING: 1
FACIAL SWELLING: 0
ABDOMINAL PAIN: 0
DIARRHEA: 0

## 2024-03-01 ASSESSMENT — PATIENT HEALTH QUESTIONNAIRE - PHQ9
SUM OF ALL RESPONSES TO PHQ QUESTIONS 1-9: 0
SUM OF ALL RESPONSES TO PHQ QUESTIONS 1-9: 0
1. LITTLE INTEREST OR PLEASURE IN DOING THINGS: 0
2. FEELING DOWN, DEPRESSED OR HOPELESS: 0
SUM OF ALL RESPONSES TO PHQ9 QUESTIONS 1 & 2: 0
SUM OF ALL RESPONSES TO PHQ QUESTIONS 1-9: 0
SUM OF ALL RESPONSES TO PHQ QUESTIONS 1-9: 0

## 2024-03-01 NOTE — TELEPHONE ENCOUNTER
Refill Request     CONFIRM preferrred pharmacy with the patient.    If Mail Order Rx - Pend for 90 day refill.      Last Seen: Last Seen Department: 3/1/2024  Last Seen by PCP: 11/6/2023    Last Written: 12/23/23    If no future appointment scheduled, route STAFF MESSAGE with patient name to the  Pool for scheduling.      Next Appointment:   Future Appointments   Date Time Provider Department Center   4/25/2024  9:30 AM Christine Graves PA-C [facility]       Message sent to  to schedule appt with patient?  NO      Requested Prescriptions     Pending Prescriptions Disp Refills    rosuvastatin (CRESTOR) 10 MG tablet [Pharmacy Med Name: ROSUVASTATIN 10MG] 30 tablet 0     Sig: TAKE 1 TABLET BY MOUTH DAILY

## 2024-03-03 LAB — BACTERIA THROAT AEROBE CULT: NORMAL

## 2024-03-13 ENCOUNTER — TELEPHONE (OUTPATIENT)
Dept: FAMILY MEDICINE CLINIC | Age: 34
End: 2024-03-13

## 2024-03-13 DIAGNOSIS — J20.9 ACUTE BRONCHITIS DUE TO INFECTION: ICD-10-CM

## 2024-03-13 DIAGNOSIS — R05.1 ACUTE COUGH: ICD-10-CM

## 2024-03-13 RX ORDER — METHYLPREDNISOLONE 4 MG/1
TABLET ORAL
Qty: 1 KIT | Refills: 0 | Status: SHIPPED | OUTPATIENT
Start: 2024-03-13

## 2024-03-13 RX ORDER — DOXYCYCLINE HYCLATE 100 MG
100 TABLET ORAL 2 TIMES DAILY
Qty: 14 TABLET | Refills: 0 | Status: SHIPPED | OUTPATIENT
Start: 2024-03-13 | End: 2024-03-20

## 2024-03-13 NOTE — TELEPHONE ENCOUNTER
Patient calling c/o symptoms are back from 3.1.24 visit.  Was given medications and finished a few days ago and now has head congestion, cough, chest congestion.  Asking if he can get another round of medication.  Uses [facility] in .

## 2024-04-15 DIAGNOSIS — E78.2 MIXED HYPERLIPIDEMIA: ICD-10-CM

## 2024-04-15 RX ORDER — ROSUVASTATIN CALCIUM 10 MG/1
10 TABLET, COATED ORAL DAILY
Qty: 30 TABLET | Refills: 0 | Status: SHIPPED | OUTPATIENT
Start: 2024-04-15

## 2024-04-25 ENCOUNTER — OFFICE VISIT (OUTPATIENT)
Dept: PULMONOLOGY | Age: 34
End: 2024-04-25
Payer: COMMERCIAL

## 2024-04-25 VITALS
OXYGEN SATURATION: 99 % | HEIGHT: 78 IN | SYSTOLIC BLOOD PRESSURE: 126 MMHG | BODY MASS INDEX: 36.45 KG/M2 | DIASTOLIC BLOOD PRESSURE: 82 MMHG | WEIGHT: 315 LBS | HEART RATE: 96 BPM

## 2024-04-25 DIAGNOSIS — G47.33 SEVERE OBSTRUCTIVE SLEEP APNEA: Primary | ICD-10-CM

## 2024-04-25 PROCEDURE — 99213 OFFICE O/P EST LOW 20 MIN: CPT

## 2024-04-25 PROCEDURE — 3079F DIAST BP 80-89 MM HG: CPT

## 2024-04-25 PROCEDURE — G8427 DOCREV CUR MEDS BY ELIG CLIN: HCPCS

## 2024-04-25 PROCEDURE — 3074F SYST BP LT 130 MM HG: CPT

## 2024-04-25 PROCEDURE — G8417 CALC BMI ABV UP PARAM F/U: HCPCS

## 2024-04-25 PROCEDURE — 4004F PT TOBACCO SCREEN RCVD TLK: CPT

## 2024-04-25 ASSESSMENT — SLEEP AND FATIGUE QUESTIONNAIRES
HOW LIKELY ARE YOU TO NOD OFF OR FALL ASLEEP IN A CAR, WHILE STOPPED FOR A FEW MINUTES IN TRAFFIC: WOULD NEVER DOZE
NECK CIRCUMFERENCE (INCHES): 19
HOW LIKELY ARE YOU TO NOD OFF OR FALL ASLEEP WHILE SITTING QUIETLY AFTER LUNCH WITHOUT ALCOHOL: WOULD NEVER DOZE
HOW LIKELY ARE YOU TO NOD OFF OR FALL ASLEEP WHILE LYING DOWN TO REST IN THE AFTERNOON WHEN CIRCUMSTANCES PERMIT: HIGH CHANCE OF DOZING
HOW LIKELY ARE YOU TO NOD OFF OR FALL ASLEEP WHEN YOU ARE A PASSENGER IN A CAR FOR AN HOUR WITHOUT A BREAK: WOULD NEVER DOZE
HOW LIKELY ARE YOU TO NOD OFF OR FALL ASLEEP WHILE SITTING AND TALKING TO SOMEONE: WOULD NEVER DOZE
HOW LIKELY ARE YOU TO NOD OFF OR FALL ASLEEP WHILE WATCHING TV: SLIGHT CHANCE OF DOZING
HOW LIKELY ARE YOU TO NOD OFF OR FALL ASLEEP WHILE SITTING INACTIVE IN A PUBLIC PLACE: WOULD NEVER DOZE
ESS TOTAL SCORE: 5
HOW LIKELY ARE YOU TO NOD OFF OR FALL ASLEEP WHILE SITTING AND READING: SLIGHT CHANCE OF DOZING

## 2024-04-25 NOTE — PATIENT INSTRUCTIONS
Attention:   Effective June 1, 2024, I will be leaving pulmonary and critical care medicine and no longer be seeing patients in [facility]'s pulmonary office.    I will be transitioning to a new role, focusing exclusively on sleep medicine and will have new offices at [facility] & [facility].    My new primary office will still be in this building -- [address] at [facility] [address] (shared with [facility]NT [facility] Neurology specialists).    My new office phone number will be [phone].             Great to see you again and take care Elton!  Let us know if you need anything.  -Christine      Never drive a car or operate a motorized vehicle while drowsy or sleepy.       Sleep Hygiene... Important practices for better sleep:    Avoid naps. This will ensure you are sleepy at bedtime.  If you have to take a nap, sleep less than 1 hour, before 3 pm.  SCHEDULE: Have a fixed bedtime and awakening time. The human body thrives on routines. Only deviate from these set sleep times about 1-2 hours on the weekends (more than this will start altering your internal clock). You will feel better keeping a regular sleep cycle and giving your body a dependable pattern, even (especially) if you are retired or not working.   Use light to train your biological clock: When you get up in the morning, exposure yourself to bright lights. When preparing for bed, dim the lights and avoid exposure to screens.  The blue light from electronic screens tells our brain that it is time to be awake; it inhibits melatonin production which stops our brain from helping us get to sleep.   Go to bed only when sleepy; this reduces the time you are awake in bed (which can lead to frustration and negative thoughts about sleep). If you can't fall asleep within 15-30 minutes, get up and do something boring until you feel sleepy again. Sit quietly in the dark or read the warranty on your refrigerator. Don't expose yourself to bright light

## 2024-04-25 NOTE — PROGRESS NOTES
PULMONARY, CRITICAL CARE AND SLEEP MEDICINE   Outpatient Sleep Progress Note  CC: \"I don't sleep good at night, I constantly wake up choking\"   Referring Provider:Dr. Salgado     Interval History 4/25/24:  31-90  -  Set up with AirSense 11 3/5/24.  Things are going \"tremendous\" with CPAP.  He feels \"100% better.\"  Adjusted to it right away.  Able to get up on the first alarm, feels rested and awake upon getting up.  No longer requiring naps through the day.  Only drinking a few cups of coffee now for pleasure rather than relying on excessive amounts for stimulation.  Air pressures feel fine, ramp off.  Humidity on auto, sometimes condensation accumulates in tubing.  Nasal mask and able to keep mouth closed.  No questions or concerns today.  Snoring is resolved, no more tossing/ turning, wakes up in same position.  Takes it with him to [employer].   -  KIRT treated with benefit with APAP 5-15; used 5:33 hrs avg with compliance >4 hour use 21/30 (used 27) days, residual AHI 1.3.  Pressures: median 11.5, 95th% 13.8, max 14.5.  ESS: 5.       Interval History 2/9/24:  f/u HST  -  Had HST 1/11/24 demonstrating severe KIRT with AHI 40.  ESS is: 7.  Patient is not surprised by results and is looking forward to starting treatment.  He is receptive to CPAP and has heard great things about it.     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  + frequent choking or gasping during sleep. + AM headaches.  No treatments tried so far & has not been evaluated for sleep apnea in the past.  Routine is relatively inconsistent due to his work as a .  Is very difficult to awaken, can sleep through alarms and other very loud noises.  Gets ~ 3.5-4 hrs of sleep per 24-hour period, with frequent calls at all times of day & night so

## 2024-05-13 ENCOUNTER — OFFICE VISIT (OUTPATIENT)
Dept: FAMILY MEDICINE CLINIC | Age: 34
End: 2024-05-13
Payer: COMMERCIAL

## 2024-05-13 VITALS
DIASTOLIC BLOOD PRESSURE: 86 MMHG | BODY MASS INDEX: 36.45 KG/M2 | WEIGHT: 315 LBS | SYSTOLIC BLOOD PRESSURE: 130 MMHG | OXYGEN SATURATION: 95 % | HEIGHT: 78 IN | HEART RATE: 99 BPM

## 2024-05-13 DIAGNOSIS — R25.2 LEG CRAMPS: ICD-10-CM

## 2024-05-13 DIAGNOSIS — Z13.220 SCREENING FOR CHOLESTEROL LEVEL: ICD-10-CM

## 2024-05-13 DIAGNOSIS — R73.03 PREDIABETES: ICD-10-CM

## 2024-05-13 DIAGNOSIS — E66.01 CLASS 3 SEVERE OBESITY DUE TO EXCESS CALORIES WITH SERIOUS COMORBIDITY AND BODY MASS INDEX (BMI) OF 50.0 TO 59.9 IN ADULT (HCC): ICD-10-CM

## 2024-05-13 DIAGNOSIS — I83.893 VARICOSE VEINS OF BOTH LEGS WITH EDEMA: ICD-10-CM

## 2024-05-13 DIAGNOSIS — Z13.0 SCREENING FOR DISORDER OF BLOOD AND BLOOD-FORMING ORGANS: ICD-10-CM

## 2024-05-13 DIAGNOSIS — E88.819 INSULIN RESISTANCE: ICD-10-CM

## 2024-05-13 DIAGNOSIS — I87.2 VENOUS INSUFFICIENCY OF BOTH LOWER EXTREMITIES: Primary | ICD-10-CM

## 2024-05-13 DIAGNOSIS — R22.43 LOCALIZED SWELLING OF BOTH LOWER LEGS: ICD-10-CM

## 2024-05-13 DIAGNOSIS — Z13.21 ENCOUNTER FOR VITAMIN DEFICIENCY SCREENING: ICD-10-CM

## 2024-05-13 PROCEDURE — 4004F PT TOBACCO SCREEN RCVD TLK: CPT | Performed by: NURSE PRACTITIONER

## 2024-05-13 PROCEDURE — 36415 COLL VENOUS BLD VENIPUNCTURE: CPT | Performed by: NURSE PRACTITIONER

## 2024-05-13 PROCEDURE — 99214 OFFICE O/P EST MOD 30 MIN: CPT | Performed by: NURSE PRACTITIONER

## 2024-05-13 PROCEDURE — G8417 CALC BMI ABV UP PARAM F/U: HCPCS | Performed by: NURSE PRACTITIONER

## 2024-05-13 PROCEDURE — 3075F SYST BP GE 130 - 139MM HG: CPT | Performed by: NURSE PRACTITIONER

## 2024-05-13 PROCEDURE — G8427 DOCREV CUR MEDS BY ELIG CLIN: HCPCS | Performed by: NURSE PRACTITIONER

## 2024-05-13 PROCEDURE — 3079F DIAST BP 80-89 MM HG: CPT | Performed by: NURSE PRACTITIONER

## 2024-05-13 RX ORDER — SEMAGLUTIDE 1.34 MG/ML
INJECTION, SOLUTION SUBCUTANEOUS
Qty: 1.5 ML | Refills: 0 | Status: SHIPPED | OUTPATIENT
Start: 2024-05-13

## 2024-05-13 RX ORDER — ACETAMINOPHEN 160 MG
TABLET,DISINTEGRATING ORAL
COMMUNITY

## 2024-05-13 ASSESSMENT — ENCOUNTER SYMPTOMS: RESPIRATORY NEGATIVE: 1

## 2024-05-13 NOTE — PATIENT INSTRUCTIONS
Not feeling your best?  Where to go for the right care at the right time.    Dear Elton Ross II   I wanted to provide you with some information that might help you seek care for your condition when your primary care provider or specialist is unavailable. If you have a need outside of normal business hours, you should first contact your primary care office or specialist caring for your condition. They may have on-call providers that could assist with your care. During office hours, you may request a virtual or same day appointment.   But what if your primary care provider is not in the office that day and you can't wait until the  next day for care? In that situation, your next option is to visit an urgent care facility.          [facility] now has urgent care sites open to support our community.   [facility] is a great alternative when you need immediate medical care that is not a serious threat to your health or your doctor's office is closed or unable to get you in for an appointment. The urgent care centers offer fast access to [facility] doctors for minor illnesses and injuries for patients of all ages. There are other medical services available including lab testing, X-rays, EKGs, and IV fluids.  Locations are open daily from 8 a.m. - 8 p.m.     [facility]  [address], [place], [place] [address]  [phone]    [address], [place], [place] [address]  [phone]    Local Urgent Care     [facility] 8:30 am - 7:70 pm   [address] [place] [address]  [phone]    [facility]     8 am - 8 pm   [address] [address]  [phone]    [facility] [place] [place] 7 am - 7:30 pm  [address] [place] [address]  [phone]     [facility] [place] 7 am - 7:30 pm  [address] [place] [address]  [phone]    [place]

## 2024-05-13 NOTE — PROGRESS NOTES
[facility]  [facility]  [address] [place] [id number]  Dept: [phone]  Dept Fax: [phone]  Loc: [phone]    Elton Ross II is a 33 y.o. male who presents today for his medical conditions/complaints as noted below.  Elton Ross II is c/o of Other (Pt is having some bilateral edema that goes into his shins and was doing well with increased water pill and now it is not helping as much)        HPI:     Chief Complaint   Patient presents with    Other     Pt is having some bilateral edema that goes into his shins and was doing well with increased water pill and now it is not helping as much       HPI    Elton presents to the office today to discuss lower leg swelling.  He admits to noticing increased swelling in his lower legs in the last 2 weeks.  He states that the swelling seems to be more in his shin region.  He states that when he would touch his shins, he would notice indention from his finger from the swelling.  He states that he had some leg cramping as well.  He also noticed that his legs felt heavy.  He also felt like his legs were more restless when he had more swelling.  He states that he did not have any increased salt intake in his diet that he is aware of.  He states he is actually working with a nutritionist and getting exercise several days a week and also eating well-balanced meals.  He states he is watching his portion size.  He states that he generally stands or sits for long periods of time.  He is not elevating his legs above his heart at this time. He states he has varicose veins for years. He states he is not having any pain from his varicose veins. He admits to taking Triam-HCTZ for his leg swelling and BP and states the HCTZ was helping with his leg swelling until about two weeks ago.      Elton admits to having pre-diabetes.  He has improved his diet and has worked on increasing his aerobic activity and strength training.  He is working with a

## 2024-05-14 ENCOUNTER — TELEPHONE (OUTPATIENT)
Dept: FAMILY MEDICINE CLINIC | Age: 34
End: 2024-05-14

## 2024-05-14 DIAGNOSIS — R79.89 ELEVATED PLATELET COUNT: Primary | ICD-10-CM

## 2024-05-14 DIAGNOSIS — Z78.9 HEPATITIS B VACCINATION STATUS UNKNOWN: ICD-10-CM

## 2024-05-14 LAB
25(OH)D3 SERPL-MCNC: 39.8 NG/ML
ALBUMIN SERPL-MCNC: 4.7 G/DL (ref 3.4–5)
ALBUMIN/GLOB SERPL: 1.3 {RATIO} (ref 1.1–2.2)
ALP SERPL-CCNC: 80 U/L (ref 40–129)
ALT SERPL-CCNC: 25 U/L (ref 10–40)
ANION GAP SERPL CALCULATED.3IONS-SCNC: 18 MMOL/L (ref 3–16)
AST SERPL-CCNC: 22 U/L (ref 15–37)
BASOPHILS # BLD: 0.2 K/UL (ref 0–0.2)
BASOPHILS NFR BLD: 2 %
BILIRUB SERPL-MCNC: 0.3 MG/DL (ref 0–1)
BUN SERPL-MCNC: 13 MG/DL (ref 7–20)
CALCIUM SERPL-MCNC: 10.2 MG/DL (ref 8.3–10.6)
CHLORIDE SERPL-SCNC: 98 MMOL/L (ref 99–110)
CHOLEST SERPL-MCNC: 217 MG/DL (ref 0–199)
CO2 SERPL-SCNC: 22 MMOL/L (ref 21–32)
CREAT SERPL-MCNC: 0.9 MG/DL (ref 0.9–1.3)
DEPRECATED RDW RBC AUTO: 14.2 % (ref 12.4–15.4)
EOSINOPHIL # BLD: 0.1 K/UL (ref 0–0.6)
EOSINOPHIL NFR BLD: 1 %
EST. AVERAGE GLUCOSE BLD GHB EST-MCNC: 122.6 MG/DL
FOLATE SERPL-MCNC: 9.26 NG/ML (ref 4.78–24.2)
GFR SERPLBLD CREATININE-BSD FMLA CKD-EPI: >90 ML/MIN/{1.73_M2}
GLUCOSE SERPL-MCNC: 88 MG/DL (ref 70–99)
HBA1C MFR BLD: 5.9 %
HBV SURFACE AB SERPL IA-ACNC: <3.5 MIU/ML
HCT VFR BLD AUTO: 48.4 % (ref 40.5–52.5)
HDLC SERPL-MCNC: 37 MG/DL (ref 40–60)
HGB BLD-MCNC: 16 G/DL (ref 13.5–17.5)
LDLC SERPL CALC-MCNC: 145 MG/DL
LYMPHOCYTES # BLD: 4.3 K/UL (ref 1–5.1)
LYMPHOCYTES NFR BLD: 36.8 %
MAGNESIUM SERPL-MCNC: 2.2 MG/DL (ref 1.8–2.4)
MCH RBC QN AUTO: 28.6 PG (ref 26–34)
MCHC RBC AUTO-ENTMCNC: 33.1 G/DL (ref 31–36)
MCV RBC AUTO: 86.5 FL (ref 80–100)
MONOCYTES # BLD: 1.2 K/UL (ref 0–1.3)
MONOCYTES NFR BLD: 10.6 %
NEUTROPHILS # BLD: 5.8 K/UL (ref 1.7–7.7)
NEUTROPHILS NFR BLD: 49.6 %
PLATELET # BLD AUTO: 485 K/UL (ref 135–450)
PLATELET BLD QL SMEAR: ABNORMAL
PMV BLD AUTO: 10.8 FL (ref 5–10.5)
POTASSIUM SERPL-SCNC: 4.7 MMOL/L (ref 3.5–5.1)
PROT SERPL-MCNC: 8.3 G/DL (ref 6.4–8.2)
RBC # BLD AUTO: 5.6 M/UL (ref 4.2–5.9)
SLIDE REVIEW: ABNORMAL
SODIUM SERPL-SCNC: 138 MMOL/L (ref 136–145)
TRIGL SERPL-MCNC: 176 MG/DL (ref 0–150)
TSH SERPL DL<=0.005 MIU/L-ACNC: 0.81 UIU/ML (ref 0.27–4.2)
VIT B12 SERPL-MCNC: 452 PG/ML (ref 211–911)
VLDLC SERPL CALC-MCNC: 35 MG/DL
WBC # BLD AUTO: 11.7 K/UL (ref 4–11)

## 2024-05-14 NOTE — TELEPHONE ENCOUNTER
SUBMITTED PA FOR Ozempic (0.25 or 0.5 MG/DOSE) 2MG/3ML pen-injectors VIA CMM Key: [id number] STATUS PENDING.      FOLLOW UP DONE DAILY: IF NO RESPONSE IN 3 DAYS WE WILL REFAX FOR STATUS CHECK. IF ANOTHER 3 DAYS GOES BY WITH NO RESPONSE WILL CALL INSURANCE FOR STATUS.

## 2024-05-15 DIAGNOSIS — E78.2 MIXED HYPERLIPIDEMIA: ICD-10-CM

## 2024-05-15 RX ORDER — ROSUVASTATIN CALCIUM 10 MG/1
10 TABLET, COATED ORAL DAILY
Qty: 30 TABLET | Refills: 0 | Status: SHIPPED | OUTPATIENT
Start: 2024-05-15

## 2024-05-28 ENCOUNTER — TELEPHONE (OUTPATIENT)
Dept: PULMONOLOGY | Age: 34
End: 2024-05-28

## 2024-05-28 NOTE — TELEPHONE ENCOUNTER
Called and LVM for patient to call back to get schedule in for next one year appointment in new office. LOV 04/25/2024.

## 2024-06-13 DIAGNOSIS — E78.2 MIXED HYPERLIPIDEMIA: ICD-10-CM

## 2024-06-13 RX ORDER — ROSUVASTATIN CALCIUM 10 MG/1
10 TABLET, COATED ORAL DAILY
Qty: 30 TABLET | Refills: 0 | Status: SHIPPED | OUTPATIENT
Start: 2024-06-13

## 2024-06-13 NOTE — TELEPHONE ENCOUNTER
Refill Request     CONFIRM preferrred pharmacy with the patient.    If Mail Order Rx - Pend for 90 day refill.      Last Seen: Last Seen Department: 5/13/2024  Last Seen by PCP: 11/6/2023    Last Written: 5/15/24    If no future appointment scheduled, route STAFF MESSAGE with patient name to the  Pool for scheduling.      Next Appointment:   No future appointments.    Message sent to  to schedule appt with patient?  N/A      Requested Prescriptions     Pending Prescriptions Disp Refills    rosuvastatin (CRESTOR) 10 MG tablet [Pharmacy Med Name: ROSUVASTATIN 10MG] 30 tablet 0     Sig: TAKE 1 TABLET BY MOUTH DAILY

## 2024-07-15 DIAGNOSIS — E78.2 MIXED HYPERLIPIDEMIA: ICD-10-CM

## 2024-07-15 RX ORDER — ROSUVASTATIN CALCIUM 10 MG/1
10 TABLET, COATED ORAL DAILY
Qty: 30 TABLET | Refills: 0 | Status: SHIPPED | OUTPATIENT
Start: 2024-07-15

## 2024-07-15 NOTE — TELEPHONE ENCOUNTER
Refill Request     CONFIRM preferrred pharmacy with the patient.    If Mail Order Rx - Pend for 90 day refill.      Last Seen: Last Seen Department: 5/13/2024  Last Seen by PCP: 11/6/2023    Last Written: 6/13/2024    If no future appointment scheduled, route STAFF MESSAGE with patient name to the  Pool for scheduling.      Next Appointment:   No future appointments.    Message sent to  to schedule appt with patient?  NO      Requested Prescriptions     Pending Prescriptions Disp Refills    rosuvastatin (CRESTOR) 10 MG tablet [Pharmacy Med Name: ROSUVASTATIN 10MG] 30 tablet 0     Sig: TAKE 1 TABLET BY MOUTH DAILY        Patient needs an appt

## 2024-08-08 ENCOUNTER — APPOINTMENT (OUTPATIENT)
Dept: GENERAL RADIOLOGY | Age: 34
End: 2024-08-08
Payer: COMMERCIAL

## 2024-08-08 ENCOUNTER — HOSPITAL ENCOUNTER (EMERGENCY)
Age: 34
Discharge: HOME | End: 2024-08-08
Payer: COMMERCIAL

## 2024-08-08 VITALS
OXYGEN SATURATION: 98 % | HEIGHT: 78 IN | HEART RATE: 96 BPM | RESPIRATION RATE: 16 BRPM | BODY MASS INDEX: 36.45 KG/M2 | SYSTOLIC BLOOD PRESSURE: 130 MMHG | WEIGHT: 315 LBS | TEMPERATURE: 98.8 F | DIASTOLIC BLOOD PRESSURE: 76 MMHG

## 2024-08-08 DIAGNOSIS — S93.402A SPRAIN OF LEFT ANKLE, UNSPECIFIED LIGAMENT, INITIAL ENCOUNTER: Primary | ICD-10-CM

## 2024-08-08 PROCEDURE — 73610 X-RAY EXAM OF ANKLE: CPT

## 2024-08-08 PROCEDURE — 99283 EMERGENCY DEPT VISIT LOW MDM: CPT

## 2024-08-08 ASSESSMENT — PAIN - FUNCTIONAL ASSESSMENT: PAIN_FUNCTIONAL_ASSESSMENT: 0-10

## 2024-08-08 ASSESSMENT — PAIN DESCRIPTION - ORIENTATION: ORIENTATION: LEFT

## 2024-08-08 ASSESSMENT — PAIN DESCRIPTION - DESCRIPTORS: DESCRIPTORS: DULL;SHARP

## 2024-08-08 ASSESSMENT — LIFESTYLE VARIABLES
HOW MANY STANDARD DRINKS CONTAINING ALCOHOL DO YOU HAVE ON A TYPICAL DAY: PATIENT DOES NOT DRINK
HOW OFTEN DO YOU HAVE A DRINK CONTAINING ALCOHOL: NEVER

## 2024-08-08 ASSESSMENT — PAIN SCALES - GENERAL: PAINLEVEL_OUTOF10: 7

## 2024-08-08 ASSESSMENT — PAIN DESCRIPTION - LOCATION: LOCATION: ANKLE

## 2024-08-09 DIAGNOSIS — E66.01 CLASS 3 SEVERE OBESITY DUE TO EXCESS CALORIES WITH SERIOUS COMORBIDITY AND BODY MASS INDEX (BMI) OF 50.0 TO 59.9 IN ADULT (HCC): ICD-10-CM

## 2024-08-09 DIAGNOSIS — R73.03 PREDIABETES: ICD-10-CM

## 2024-08-09 DIAGNOSIS — E88.819 INSULIN RESISTANCE: ICD-10-CM

## 2024-08-09 SDOH — HEALTH STABILITY: PHYSICAL HEALTH: ON AVERAGE, HOW MANY MINUTES DO YOU ENGAGE IN EXERCISE AT THIS LEVEL?: 60 MIN

## 2024-08-09 NOTE — DISCHARGE INSTR - COC
Continuity of Care Form    Patient Name: Elton Ross II   :  1990  MRN:  [id number]    Admit date:  2024  Discharge date:  ***    Code Status Order: Prior   Advance Directives:     Admitting Physician:  No admitting provider for patient encounter.  PCP: Vaqsuez Salgado MD    Discharging Nurse: ***  Discharging Hospital Unit/Room#: R1-R3/R2  Discharging Unit Phone Number: ***    Emergency Contact:   Extended Emergency Contact Information  Primary Emergency Contact: sowmya winston   [employer]  Home Phone: [phone]  Relation: Parent    Past Surgical History:  Past Surgical History:   Procedure Laterality Date    SPLENECTOMY N/A 2020    SPLENECTOMY, LIVER BIOPSY performed by [facility] Saleh [facility]    SPLENECTOMY, TOTAL  2020    SPLENECTOMY, LIVER BIOPSY    TYMPANOSTOMY TUBE PLACEMENT         Immunization History:   Immunization History   Administered Date(s) Administered    COVID-19, PFIZER PURPLE top, DILUTE for use, (age 12 y+), 30mcg/0.3mL 2021    Hib PRP-T, ACTHIB (age 2m-5y, Adlt Risk), HIBERIX (age 6w-4y, Adlt Risk), IM, 0.5mL 2020    Meningococcal ACWY, MENACTRA (MenACWY-D), (age 9m-55y), IM, 0.5mL 2020    Meningococcal B, BEXSERO, (age 10y-25y), IM, 0.5mL 2020    Pneumococcal, PCV-13, PREVNAR 13, (age 6w+), IM, 0.5mL 2020    Pneumococcal, PPSV23, PNEUMOVAX 23, (age 2y+), SC/IM, 0.5mL 2020    TDaP, ADACEL (age 10y-64y), BOOSTRIX (age 10y+), IM, 0.5mL 2019       Active Problems:  Patient Active Problem List   Diagnosis Code    Congenital abnormality Q89.9    Anemia D64.9    Splenomegaly R16.1    Other neutropenia (HCC) D70.8    Neutropenia (HCC) D70.9    Class 3 severe obesity due to excess calories with serious comorbidity and body mass index (BMI) of 50.0 to 59.9 in adult (HCC) E66.01, Z68.43    Hypersplenism 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 
n/a

## 2024-08-09 NOTE — ED PROVIDER NOTES
no ecchymosis .Tendon function normal. Palmer test negative.     Skin:     General: Skin is warm.      Capillary Refill: Capillary refill takes less than 2 seconds.   Neurological:      Mental Status: He is alert.           DIAGNOSTIC RESULTS   LABS:    Labs Reviewed - No data to display    When ordered only abnormal lab results are displayed. All other labs were within normal range or not returned as of this dictation.    EKG: When ordered, EKG's are interpreted by the Emergency Department Physician in the absence of a cardiologist.  Please see their note for interpretation of EKG.    RADIOLOGY:   Non-plain film images such as CT, Ultrasound and MRI are read by the radiologist. Plain radiographic images are visualized and preliminarily interpreted by the ED Provider with the below findings:      Interpretation per the Radiologist below, if available at the time of this note:    XR ANKLE LEFT (MIN 3 VIEWS)   Final Result   Lateral malleolar soft tissue swelling with no acute fracture or dislocation.           XR ANKLE LEFT (MIN 3 VIEWS)    Result Date: 8/8/2024  EXAMINATION: THREE XRAY VIEWS OF THE LEFT ANKLE 8/8/2024 8:41 pm COMPARISON: 09/16/2013. HISTORY: ORDERING SYSTEM PROVIDED HISTORY: fall TECHNOLOGIST PROVIDED HISTORY: Reason for exam:->fall Reason for Exam: fall FINDINGS: Mineralization appears normal.  There is laterally lower soft tissue swelling.  The ankle mortise is not widened.  The talar dome is intact.  No acute fracture, dislocation or focal bone lesion is identified.     Lateral malleolar soft tissue swelling with no acute fracture or dislocation.        No results found.    PROCEDURES   Unless otherwise noted below, none     Procedures    CRITICAL CARE TIME (.cctime)         EMERGENCY DEPARTMENT COURSE and DIFFERENTIAL DIAGNOSIS/MDM:   Vitals:    Vitals:    08/08/24 2124 08/08/24 2303   BP: (!) 140/88 130/76   Pulse: 98 96   Resp: 20 16   Temp: 98.5 °F (36.9 °C) 98.8 °F (37.1 °C)   TempSrc:

## 2024-08-12 ENCOUNTER — TELEPHONE (OUTPATIENT)
Dept: FAMILY MEDICINE CLINIC | Age: 34
End: 2024-08-12

## 2024-08-12 ENCOUNTER — OFFICE VISIT (OUTPATIENT)
Dept: ORTHOPEDIC SURGERY | Age: 34
End: 2024-08-12
Payer: COMMERCIAL

## 2024-08-12 VITALS — WEIGHT: 315 LBS | BODY MASS INDEX: 36.45 KG/M2 | HEIGHT: 78 IN

## 2024-08-12 DIAGNOSIS — M25.572 ACUTE LEFT ANKLE PAIN: Primary | ICD-10-CM

## 2024-08-12 DIAGNOSIS — S93.492A SPRAIN OF ANTERIOR TALOFIBULAR LIGAMENT OF LEFT ANKLE, INITIAL ENCOUNTER: ICD-10-CM

## 2024-08-12 PROCEDURE — G8427 DOCREV CUR MEDS BY ELIG CLIN: HCPCS | Performed by: PHYSICIAN ASSISTANT

## 2024-08-12 PROCEDURE — 4004F PT TOBACCO SCREEN RCVD TLK: CPT | Performed by: PHYSICIAN ASSISTANT

## 2024-08-12 PROCEDURE — G8417 CALC BMI ABV UP PARAM F/U: HCPCS | Performed by: PHYSICIAN ASSISTANT

## 2024-08-12 PROCEDURE — 99203 OFFICE O/P NEW LOW 30 MIN: CPT | Performed by: PHYSICIAN ASSISTANT

## 2024-08-12 RX ORDER — SEMAGLUTIDE 1.34 MG/ML
INJECTION, SOLUTION SUBCUTANEOUS
Qty: 1.5 ML | Refills: 0 | Status: SHIPPED | OUTPATIENT
Start: 2024-08-12

## 2024-08-12 NOTE — PROGRESS NOTES
Chief Complaint    Ankle Injury (Left Ankle  - DOI 08/08/2024 Fall At home )      History of Present Illness:  Elton Ross II is a 34 y.o. male who presents to the office today for new problem.  Patient here with a chief complaint of left ankle pain.  Patient did fall at home on August 8, 2024.  Patient states he felt a crack.  She is placed into a tall boot at the emergency room.  He continues to complain of significant pain symptoms.  Pain both medial, lateral, and posterior.    Medical History:  Past Medical History:   Diagnosis Date    Asthma     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    Enlarged heart     S/P splenectomy 10/20/2020     Patient Active Problem List    Diagnosis Date Noted    Venous insufficiency of both lower extremities 05/13/2024    Prediabetes 05/13/2024    Insulin resistance 05/13/2024    Severe obstructive sleep apnea 02/09/2024    Primary hypertension 10/24/2023    Varicose veins of both legs with edema 09/18/2023    Situational anxiety 09/05/2023    S/P splenectomy 10/20/2020    Hypersplenism     Hepatomegaly     Class 3 severe obesity due to excess calories with serious comorbidity and body mass index (BMI) of 50.0 to 59.9 in adult (HCC)     Anemia 08/12/2020    Splenomegaly 08/12/2020    Other neutropenia (HCC) 08/12/2020    Neutropenia (HCC)     Congenital abnormality      Past Surgical History:   Procedure Laterality Date    SPLENECTOMY N/A 8/14/2020    SPLENECTOMY, LIVER BIOPSY performed by Josh Saleh MD at [facility] [place]    SPLENECTOMY, TOTAL  08/14/2020    SPLENECTOMY, LIVER BIOPSY    TYMPANOSTOMY TUBE PLACEMENT       History reviewed. No pertinent family history.  Social History     Socioeconomic History    Marital status:      Spouse name: None    Number of children: None    Years of education: None    Highest education level: None   Tobacco Use    Smoking status: Never    Smokeless tobacco: Current     Types: Chew

## 2024-08-12 NOTE — TELEPHONE ENCOUNTER
Elton Ross II is requesting refill(s)   Last OV 5/13/2024   (pertaining to medication)  LR 5/13/24 (per medication requested)  Next office visit scheduled or attempted No   If no, reason:  Visit date not found

## 2024-08-13 ENCOUNTER — HOSPITAL ENCOUNTER (OUTPATIENT)
Dept: CT IMAGING | Age: 34
Discharge: HOME | End: 2024-08-13
Payer: COMMERCIAL

## 2024-08-13 ENCOUNTER — OFFICE VISIT (OUTPATIENT)
Dept: ORTHOPEDIC SURGERY | Age: 34
End: 2024-08-13
Payer: COMMERCIAL

## 2024-08-13 VITALS — BODY MASS INDEX: 36.45 KG/M2 | HEIGHT: 78 IN | WEIGHT: 315 LBS

## 2024-08-13 DIAGNOSIS — S93.492A SPRAIN OF ANTERIOR TALOFIBULAR LIGAMENT OF LEFT ANKLE, INITIAL ENCOUNTER: Primary | ICD-10-CM

## 2024-08-13 DIAGNOSIS — S82.392A CLOSED FRACTURE OF POSTERIOR MALLEOLUS OF LEFT TIBIA, INITIAL ENCOUNTER: ICD-10-CM

## 2024-08-13 DIAGNOSIS — S93.492A SPRAIN OF ANTERIOR TALOFIBULAR LIGAMENT OF LEFT ANKLE, INITIAL ENCOUNTER: ICD-10-CM

## 2024-08-13 PROCEDURE — 99213 OFFICE O/P EST LOW 20 MIN: CPT | Performed by: PHYSICIAN ASSISTANT

## 2024-08-13 PROCEDURE — 27824 TREAT LOWER LEG FRACTURE: CPT | Performed by: PHYSICIAN ASSISTANT

## 2024-08-13 PROCEDURE — 73700 CT LOWER EXTREMITY W/O DYE: CPT

## 2024-08-13 PROCEDURE — 4004F PT TOBACCO SCREEN RCVD TLK: CPT | Performed by: PHYSICIAN ASSISTANT

## 2024-08-13 PROCEDURE — G8417 CALC BMI ABV UP PARAM F/U: HCPCS | Performed by: PHYSICIAN ASSISTANT

## 2024-08-13 PROCEDURE — G8428 CUR MEDS NOT DOCUMENT: HCPCS | Performed by: PHYSICIAN ASSISTANT

## 2024-08-13 NOTE — TELEPHONE ENCOUNTER
PLEASE SEE ENCOUNTER FROM 05/14/2024 OZEMPIC WAS DENIED      If this requires a response please respond to the pool ( P [facility] MEDICATION PRE-AUTH).      Thank you please advise patient.

## 2024-08-13 NOTE — PROGRESS NOTES
osteoarthritis.  Medial lateral malleolus are intact.    Impression:  Encounter Diagnoses   Name Primary?    Sprain of anterior talofibular ligament of left ankle, initial encounter Yes    Closed fracture of posterior malleolus of left tibia, initial encounter          Office Procedures:  No orders of the defined types were placed in this encounter.      Treatment Plan: Patient has significant ankle pain with posterior malleolus fracture.  Patient will stay in tall fracture boot.  He can be weightbearing as tolerated.  Car-ray in 3 weeks.  Rest ice and elevate and is placed on light duty    I discussed with Elton Ross II that his history, symptoms, signs, and imaging are most consistent with  ankle sprain with possible fracture .    We reviewed the natural history of these conditions and treatment options ranging from conservative measures (rest, icing, activity modification, physical therapy, pain meds) to surgical options.     In terms of treatment, I recommended continuing with rest, icing, avoidance of painful activities, NSAIDs or pain meds as tolerated, and physical therapy.     We discussed surgical options as well, should conservative measures fail.

## 2024-08-15 ENCOUNTER — TELEPHONE (OUTPATIENT)
Dept: FAMILY MEDICINE CLINIC | Age: 34
End: 2024-08-15

## 2024-08-16 NOTE — TELEPHONE ENCOUNTER
SEE ENCOUNTER FROM 05/14/2024 OZEMPIC WAS DENIED      If this requires a response please respond to the pool ( P [facility] MEDICATION PRE-AUTH).      Thank you please advise patient.

## 2024-08-20 DIAGNOSIS — E78.2 MIXED HYPERLIPIDEMIA: ICD-10-CM

## 2024-08-21 RX ORDER — ROSUVASTATIN CALCIUM 10 MG/1
10 TABLET, COATED ORAL DAILY
Qty: 90 TABLET | Refills: 3 | Status: SHIPPED | OUTPATIENT
Start: 2024-08-21

## 2024-08-21 NOTE — TELEPHONE ENCOUNTER
Refill Request     CONFIRM preferrred pharmacy with the patient.    If Mail Order Rx - Pend for 90 day refill.      Last Seen: Last Seen Department: 5/13/2024  Last Seen by PCP: 11/6/2023    Last Written: 7/15/24    If no future appointment scheduled, route STAFF MESSAGE with patient name to the  Pool for scheduling.      Next Appointment:   Future Appointments   Date Time Provider Department Center   9/5/2024  8:45 AM Norm Corona MD AND [facility]       Message sent to  to schedule appt with patient?  N/A      Requested Prescriptions     Pending Prescriptions Disp Refills    rosuvastatin (CRESTOR) 10 MG tablet [Pharmacy Med Name: ROSUVASTATIN 10MG] 30 tablet 0     Sig: TAKE 1 TABLET BY MOUTH DAILY

## 2024-09-05 ENCOUNTER — PREP FOR PROCEDURE (OUTPATIENT)
Dept: ORTHOPEDIC SURGERY | Age: 34
End: 2024-09-05

## 2024-09-05 ENCOUNTER — OFFICE VISIT (OUTPATIENT)
Dept: ORTHOPEDIC SURGERY | Age: 34
End: 2024-09-05

## 2024-09-05 VITALS — WEIGHT: 315 LBS | BODY MASS INDEX: 36.45 KG/M2 | HEIGHT: 78 IN

## 2024-09-05 DIAGNOSIS — S82.392A CLOSED FRACTURE OF POSTERIOR MALLEOLUS OF LEFT TIBIA, INITIAL ENCOUNTER: Primary | ICD-10-CM

## 2024-09-05 PROCEDURE — 99024 POSTOP FOLLOW-UP VISIT: CPT | Performed by: ORTHOPAEDIC SURGERY

## 2024-09-05 NOTE — PROGRESS NOTES
Constitutional: Patient is adequately groomed with no evidence of malnutrition  DTRs: Deep tendon reflexes are intact  Mental Status: The patient is oriented to time, place and person.  The patient's mood and affect are appropriate.  Lymphatic: The lymphatic examination bilaterally reveals all areas to be without enlargement or induration.  Vascular: Examination reveals no swelling or calf tenderness.  Peripheral pulses are palpable and 2+.  Neurological: The patient has good coordination.  There is no weakness or sensory deficit.    Left ankle Examination:    Inspection:  Swelling and ecchymosis surrounding the medial, lateral, and posterior ankle    Palpation:  Diffuse tenderness to palpation but most pronounced over the ATFL and distal fibula region.  Continued tenderness over the medial aspect of the ankle and the posterior aspect.  No tenderness over the Achilles tendon.    Range of Motion:  Limited range of motion due to pain and swelling    Strength:  Intact but limited due to pain and swelling    Special Tests:  Positive anterior drawer.  Positive talar tilt.    Skin: There are no rashes, ulcerations or lesions.    Gait: Altered    Reflex 2+ and symmetric    Additional Comments:       Additional Examinations:         Right Lower Extremity: Examination of the right lower extremity does not show any tenderness, deformity or injury.  Range of motion is unremarkable.  There is no gross instability.  There are no rashes, ulcerations or lesions.  Strength and tone are normal.     Radiology:     Previous X-rays obtained and reviewed in office:  Views 3 views including AP, lateral, and oblique  Location left ankle  Impression: Questionable small fracture to the posterior malleolus.  There is soft tissue swelling noted.  Ankle mortise is congruent well-maintained    Previous X-rays were also reviewed that were taken in the emergency room on 8/8/2024.  3 views of the ankle done on standing.  Possible fracture of the

## 2024-11-25 DIAGNOSIS — I10 PRIMARY HYPERTENSION: ICD-10-CM

## 2024-11-25 DIAGNOSIS — I10 UNCONTROLLED HYPERTENSION: ICD-10-CM

## 2024-11-25 RX ORDER — TRIAMTERENE AND HYDROCHLOROTHIAZIDE 75; 50 MG/1; MG/1
1 TABLET ORAL DAILY
Qty: 90 TABLET | Refills: 2 | Status: SHIPPED | OUTPATIENT
Start: 2024-11-25

## 2024-11-25 NOTE — TELEPHONE ENCOUNTER
Refill Request     CONFIRM preferrred pharmacy with the patient.    If Mail Order Rx - Pend for 90 day refill.      Last Seen: Last Seen Department: 5/13/2024  Last Seen by PCP: 11/6/2023    Last Written: 11/6/23    If no future appointment scheduled, route STAFF MESSAGE with patient name to the  Pool for scheduling.      Next Appointment:   No future appointments.    Message sent to  to schedule appt with patient?  N/A      Requested Prescriptions     Pending Prescriptions Disp Refills    triamterene-hydroCHLOROthiazide (MAXZIDE) 75-50 MG per tablet [Pharmacy Med Name: TRIAMT/HCTZ 75-50MG] 90 tablet 2     Sig: Take 1 tablet by mouth daily

## 2025-03-06 ENCOUNTER — OFFICE VISIT (OUTPATIENT)
Dept: FAMILY MEDICINE CLINIC | Age: 35
End: 2025-03-06
Payer: COMMERCIAL

## 2025-03-06 VITALS
DIASTOLIC BLOOD PRESSURE: 82 MMHG | SYSTOLIC BLOOD PRESSURE: 136 MMHG | HEIGHT: 78 IN | OXYGEN SATURATION: 97 % | BODY MASS INDEX: 36.45 KG/M2 | HEART RATE: 87 BPM | WEIGHT: 315 LBS

## 2025-03-06 DIAGNOSIS — Z78.9 VARICELLA VACCINATION STATUS UNKNOWN: ICD-10-CM

## 2025-03-06 DIAGNOSIS — E66.813 CLASS 3 SEVERE OBESITY DUE TO EXCESS CALORIES WITH SERIOUS COMORBIDITY AND BODY MASS INDEX (BMI) OF 50.0 TO 59.9 IN ADULT: ICD-10-CM

## 2025-03-06 DIAGNOSIS — R73.03 PREDIABETES: ICD-10-CM

## 2025-03-06 DIAGNOSIS — Z78.9 HEPATITIS B VACCINATION STATUS UNKNOWN: ICD-10-CM

## 2025-03-06 DIAGNOSIS — G47.33 SEVERE OBSTRUCTIVE SLEEP APNEA: Chronic | ICD-10-CM

## 2025-03-06 DIAGNOSIS — Z13.0 SCREENING FOR DISORDER OF BLOOD AND BLOOD-FORMING ORGANS: ICD-10-CM

## 2025-03-06 DIAGNOSIS — E78.2 MIXED HYPERLIPIDEMIA: ICD-10-CM

## 2025-03-06 DIAGNOSIS — Z78.9 MEASLES, MUMPS, RUBELLA (MMR) VACCINATION STATUS UNKNOWN: ICD-10-CM

## 2025-03-06 DIAGNOSIS — Z00.00 ANNUAL PHYSICAL EXAM: Primary | ICD-10-CM

## 2025-03-06 DIAGNOSIS — E66.01 CLASS 3 SEVERE OBESITY DUE TO EXCESS CALORIES WITH SERIOUS COMORBIDITY AND BODY MASS INDEX (BMI) OF 50.0 TO 59.9 IN ADULT: ICD-10-CM

## 2025-03-06 DIAGNOSIS — I10 PRIMARY HYPERTENSION: Chronic | ICD-10-CM

## 2025-03-06 LAB
25(OH)D3 SERPL-MCNC: 24.2 NG/ML
ALBUMIN SERPL-MCNC: 4.5 G/DL (ref 3.4–5)
ALBUMIN/GLOB SERPL: 1.3 {RATIO} (ref 1.1–2.2)
ALP SERPL-CCNC: 89 U/L (ref 40–129)
ALT SERPL-CCNC: 35 U/L (ref 10–40)
ANION GAP SERPL CALCULATED.3IONS-SCNC: 13 MMOL/L (ref 3–16)
AST SERPL-CCNC: 31 U/L (ref 15–37)
BILIRUB SERPL-MCNC: 0.5 MG/DL (ref 0–1)
BUN SERPL-MCNC: 16 MG/DL (ref 7–20)
CALCIUM SERPL-MCNC: 10.3 MG/DL (ref 8.3–10.6)
CHLORIDE SERPL-SCNC: 99 MMOL/L (ref 99–110)
CHOLEST SERPL-MCNC: 258 MG/DL (ref 0–199)
CO2 SERPL-SCNC: 26 MMOL/L (ref 21–32)
CREAT SERPL-MCNC: 0.9 MG/DL (ref 0.9–1.3)
FOLATE SERPL-MCNC: 7.43 NG/ML (ref 4.78–24.2)
GFR SERPLBLD CREATININE-BSD FMLA CKD-EPI: >90 ML/MIN/{1.73_M2}
GLUCOSE SERPL-MCNC: 117 MG/DL (ref 70–99)
HBV SURFACE AB SERPL IA-ACNC: <3.5 MIU/ML
HDLC SERPL-MCNC: 29 MG/DL (ref 40–60)
LDLC SERPL CALC-MCNC: 187 MG/DL
MAGNESIUM SERPL-MCNC: 2.14 MG/DL (ref 1.8–2.4)
POTASSIUM SERPL-SCNC: 4.4 MMOL/L (ref 3.5–5.1)
PROT SERPL-MCNC: 7.9 G/DL (ref 6.4–8.2)
SODIUM SERPL-SCNC: 138 MMOL/L (ref 136–145)
TRIGL SERPL-MCNC: 208 MG/DL (ref 0–150)
TSH SERPL DL<=0.005 MIU/L-ACNC: 0.64 UIU/ML (ref 0.27–4.2)
VIT B12 SERPL-MCNC: 452 PG/ML (ref 211–911)
VLDLC SERPL CALC-MCNC: 42 MG/DL

## 2025-03-06 PROCEDURE — 99395 PREV VISIT EST AGE 18-39: CPT | Performed by: NURSE PRACTITIONER

## 2025-03-06 PROCEDURE — 36415 COLL VENOUS BLD VENIPUNCTURE: CPT | Performed by: NURSE PRACTITIONER

## 2025-03-06 PROCEDURE — 3075F SYST BP GE 130 - 139MM HG: CPT | Performed by: NURSE PRACTITIONER

## 2025-03-06 PROCEDURE — 3079F DIAST BP 80-89 MM HG: CPT | Performed by: NURSE PRACTITIONER

## 2025-03-06 SDOH — ECONOMIC STABILITY: FOOD INSECURITY: WITHIN THE PAST 12 MONTHS, THE FOOD YOU BOUGHT JUST DIDN'T LAST AND YOU DIDN'T HAVE MONEY TO GET MORE.: NEVER TRUE

## 2025-03-06 SDOH — ECONOMIC STABILITY: FOOD INSECURITY: WITHIN THE PAST 12 MONTHS, YOU WORRIED THAT YOUR FOOD WOULD RUN OUT BEFORE YOU GOT MONEY TO BUY MORE.: NEVER TRUE

## 2025-03-06 ASSESSMENT — ENCOUNTER SYMPTOMS
VOMITING: 0
VOICE CHANGE: 0
DIARRHEA: 0
CHOKING: 0
SINUS PRESSURE: 0
EYE DISCHARGE: 0
APNEA: 0
ABDOMINAL DISTENTION: 0
COUGH: 0
BLOOD IN STOOL: 0
CHEST TIGHTNESS: 0
EYE REDNESS: 0
EYE ITCHING: 0
BACK PAIN: 0
TROUBLE SWALLOWING: 0
FACIAL SWELLING: 0
EYE PAIN: 0
WHEEZING: 0
RHINORRHEA: 0
ABDOMINAL PAIN: 0
GASTROINTESTINAL NEGATIVE: 1
RECTAL PAIN: 0
COLOR CHANGE: 0
SHORTNESS OF BREATH: 0
NAUSEA: 0
ALLERGIC/IMMUNOLOGIC NEGATIVE: 1
STRIDOR: 0
SINUS PAIN: 0
CONSTIPATION: 0
PHOTOPHOBIA: 0
ANAL BLEEDING: 0
SORE THROAT: 0
RESPIRATORY NEGATIVE: 1

## 2025-03-06 ASSESSMENT — PATIENT HEALTH QUESTIONNAIRE - PHQ9
SUM OF ALL RESPONSES TO PHQ QUESTIONS 1-9: 0
SUM OF ALL RESPONSES TO PHQ QUESTIONS 1-9: 0
2. FEELING DOWN, DEPRESSED OR HOPELESS: NOT AT ALL
SUM OF ALL RESPONSES TO PHQ QUESTIONS 1-9: 0
1. LITTLE INTEREST OR PLEASURE IN DOING THINGS: NOT AT ALL
SUM OF ALL RESPONSES TO PHQ QUESTIONS 1-9: 0

## 2025-03-06 NOTE — PATIENT INSTRUCTIONS
Not feeling your best?  Where to go for the right care at the right time.    Dear Elton Ross II   I wanted to provide you with some information that might help you seek care for your condition when your primary care provider or specialist is unavailable. If you have a need outside of normal business hours, you should first contact your primary care office or specialist caring for your condition. They may have on-call providers that could assist with your care. During office hours, you may request a virtual or same day appointment.   But what if your primary care provider is not in the office that day and you can't wait until the  next day for care? In that situation, your next option is to visit an urgent care facility.          [facility] now has urgent care sites open to support our community.   [facility] is a great alternative when you need immediate medical care that is not a serious threat to your health or your doctor's office is closed or unable to get you in for an appointment. The urgent care centers offer fast access to [facility] doctors for minor illnesses and injuries for patients of all ages. There are other medical services available including lab testing, X-rays, EKGs, and IV fluids.  Locations are open daily from 8 a.m. - 8 p.m.     [facility]  [address], [place], [place] [address]  [phone]    [address], [place], [place] [address]  [phone]    Local Urgent Care     [facility] 8:30 am - 7:70 pm   [address] [place] [address]  [phone]    [facility]     8 am - 8 pm   [address] [place] [address]  [phone]    [facility] 7 am - 7:30 pm  [address] [place] [address]  [phone]     [facility] 7 am - 7:30 pm  [address] [place] [address]  [phone]    [place]

## 2025-03-06 NOTE — PROGRESS NOTES
and or requested    Patient Past Records were reviewed and or requested    Please note that this dictation was completed with [employer], the computer voice recognition software and [employer] Art[employer] intellience software.  Quite often unanticipated grammatical, syntax, homophones, and other interpretive errors are inadvertently transcribed by the computer software.  Please disregard these errors.  Please excuse any errors that have escaped final proofreading.  Thank you.     Patient should call the office immediately with new or ongoing signs or symptoms or worsening, or proceedto the emergency room.  No changes in past medical history, past surgical history, social history, or family history were noted during the patient encounter unless specifically listed above.  All updates of past medicalhistory, past surgical history, social history, or family history were reviewed personally by me during the office visit.  All problems listed in the assessment are stable unless noted otherwise.  Medication profilereviewed personally by me during the office visit.  Medication side effects and possible impairments from medications were discussed as applicable.     Call if pattern of symptoms change or persists for an extended time.     This document was prepared by a combination of typing and transcription through a voice recognition software.     All medications have the potential for adverse effects. All medications effect each person differently. Please read and review provided information related to medication. If the medication that you have been prescribed has the potential to cause sedation, do not drive or operate car, truck, or heavy machinery until you know how the medication will effect you. If you experience any adverse effects from the medication, please call the office or report to the emergency department.       Supriya Zacarias, DNP

## 2025-03-07 ENCOUNTER — TELEPHONE (OUTPATIENT)
Dept: FAMILY MEDICINE CLINIC | Age: 35
End: 2025-03-07

## 2025-03-07 DIAGNOSIS — I10 PRIMARY HYPERTENSION: ICD-10-CM

## 2025-03-07 DIAGNOSIS — E55.9 VITAMIN D DEFICIENCY: Primary | ICD-10-CM

## 2025-03-07 DIAGNOSIS — G47.33 SEVERE OBSTRUCTIVE SLEEP APNEA: ICD-10-CM

## 2025-03-07 DIAGNOSIS — R06.83 SNORING: ICD-10-CM

## 2025-03-07 DIAGNOSIS — E78.2 MIXED HYPERLIPIDEMIA: Primary | ICD-10-CM

## 2025-03-07 DIAGNOSIS — I10 UNCONTROLLED HYPERTENSION: ICD-10-CM

## 2025-03-07 DIAGNOSIS — E66.813 CLASS 3 SEVERE OBESITY DUE TO EXCESS CALORIES WITH SERIOUS COMORBIDITY AND BODY MASS INDEX (BMI) OF 50.0 TO 59.9 IN ADULT: ICD-10-CM

## 2025-03-07 DIAGNOSIS — Z13.21 ENCOUNTER FOR VITAMIN DEFICIENCY SCREENING: ICD-10-CM

## 2025-03-07 DIAGNOSIS — E66.01 CLASS 3 SEVERE OBESITY DUE TO EXCESS CALORIES WITH SERIOUS COMORBIDITY AND BODY MASS INDEX (BMI) OF 50.0 TO 59.9 IN ADULT: ICD-10-CM

## 2025-03-07 DIAGNOSIS — E88.819 INSULIN RESISTANCE: ICD-10-CM

## 2025-03-07 DIAGNOSIS — R73.03 PREDIABETES: Primary | ICD-10-CM

## 2025-03-07 LAB
BASOPHILS # BLD: 0.3 K/UL (ref 0–0.2)
BASOPHILS NFR BLD: 3 %
DEPRECATED RDW RBC AUTO: 14.3 % (ref 12.4–15.4)
EOSINOPHIL # BLD: 0.2 K/UL (ref 0–0.6)
EOSINOPHIL NFR BLD: 2 %
EST. AVERAGE GLUCOSE BLD GHB EST-MCNC: 125.5 MG/DL
HBA1C MFR BLD: 6 %
HCT VFR BLD AUTO: 49.4 % (ref 40.5–52.5)
HGB BLD-MCNC: 16.6 G/DL (ref 13.5–17.5)
LYMPHOCYTES # BLD: 4 K/UL (ref 1–5.1)
LYMPHOCYTES NFR BLD: 39 %
MCH RBC QN AUTO: 28.5 PG (ref 26–34)
MCHC RBC AUTO-ENTMCNC: 33.5 G/DL (ref 31–36)
MCV RBC AUTO: 85.1 FL (ref 80–100)
MEV IGG SER QL IA: NORMAL
MONOCYTES # BLD: 1.2 K/UL (ref 0–1.3)
MONOCYTES NFR BLD: 13 %
MUV IGG SER QL IA: NORMAL
NEUTROPHILS # BLD: 3.8 K/UL (ref 1.7–7.7)
NEUTROPHILS NFR BLD: 33 %
NEUTS BAND NFR BLD MANUAL: 7 % (ref 0–7)
PLATELET # BLD AUTO: 418 K/UL (ref 135–450)
PLATELET BLD QL SMEAR: ADEQUATE
PMV BLD AUTO: 10.6 FL (ref 5–10.5)
POLYCHROMASIA BLD QL SMEAR: ABNORMAL
RBC # BLD AUTO: 5.81 M/UL (ref 4.2–5.9)
RUBV IGG SERPL QL IA: NORMAL
SLIDE REVIEW: ABNORMAL
VARIANT LYMPHS NFR BLD MANUAL: 3 % (ref 0–6)
VZV IGG SER QL IA: NORMAL
WBC # BLD AUTO: 9.6 K/UL (ref 4–11)

## 2025-03-07 RX ORDER — ROSUVASTATIN CALCIUM 10 MG/1
10 TABLET, COATED ORAL DAILY
Qty: 90 TABLET | Refills: 0 | Status: SHIPPED | OUTPATIENT
Start: 2025-03-07

## 2025-03-07 NOTE — TELEPHONE ENCOUNTER
RN called and spoke to patient, Elton.    Patient has vitamin D deficiency.   RN Discussed with patient that we make vitamin D from the sun and get it from some food sources, but it is very common to be deficient. Discuss the need for vitamin D replacement because low vitamin D can cause fatigue, joint aches and has been implicated in heart disease, bone disease like osteoporosis, and some other chronic illnesses.    PCP sent in cholecalciferol 50,000 units to be taken weekly for 6 months to increase vitamin D levels and then recommend taking an OTC calcium and vitamin D supplement daily to maintain vitamin D3 levels.   Recommend vitamin D to be rechecked in 6 months.  ordered and scheduled.  Cholesterol has increased dramatically.  Triglycerides are elevated as well. Good cholesterol is significantly low.    He needs to go back on his statin.    Pended Crestor 10 mg RX , Please route statin back to provider and then will need CMP, CK and lipid in 8 weeks.  RN ordered and placed on schedule.     He is not immune to Hepatitis B.    He needs his Hepatitis B vaccines for his schooling.    Recommend [facility].    He needs one more vaccine to have all three per records in chart.   He may get this when he comes in Monday for 2 step TB     B12 and folate stable.  Magnesium WNL. Thyroid function stable.      RN reviewed labs and provided education. Cholesterol is elevated.    Recommend routine exercise that elevates heart rate at least 5 times a week for at least 30-45 minutes, consuming a diet that emphasizes vegetables, fruits, whole grains, low-fat diary, poultry, fish, legumes, nuts and limited in sweets, sugar-sweetened beverages, and red meat.

## 2025-03-07 NOTE — TELEPHONE ENCOUNTER
RN called and spoke to patient.    A1C continues to increase and is at 6.   Patient would like to discuss nutrition -   RN spoke to patient about weight loss and healthy nutrition.  RN will continue to provide online and written nutrition.     He discussed with this provider starting on Wegovy.  RX pended to [facility].   I can send this in, but he will need a VV weight or in office weight with this provider in 4 weeks if covered with insurance.   Again, as I told him yesterday, most likely with not be covered with insurance.      Please verify with patient - any gallbladder issues, pancreatitis, or any thyroid cancer in family.  Patient states that he does not have any gallbladder issues, pancreatitis, or any thyroid cancer in family

## 2025-03-10 ENCOUNTER — LAB (OUTPATIENT)
Dept: FAMILY MEDICINE CLINIC | Age: 35
End: 2025-03-10
Payer: COMMERCIAL

## 2025-03-10 DIAGNOSIS — Z23 IMMUNIZATION DUE: Primary | ICD-10-CM

## 2025-03-10 PROCEDURE — 86580 TB INTRADERMAL TEST: CPT | Performed by: NURSE PRACTITIONER

## 2025-03-10 PROCEDURE — 90471 IMMUNIZATION ADMIN: CPT | Performed by: NURSE PRACTITIONER

## 2025-03-10 PROCEDURE — 90636 HEP A/HEP B VACC ADULT IM: CPT | Performed by: NURSE PRACTITIONER

## 2025-03-11 ENCOUNTER — TELEPHONE (OUTPATIENT)
Dept: ADMINISTRATIVE | Age: 35
End: 2025-03-11

## 2025-03-11 NOTE — TELEPHONE ENCOUNTER
Submitted PA for Wegovy 0.25MG/0.5ML AUTO INJECTOR  [facility] [id number] STATUS: PENDING.    Follow up done daily; if no decision with in three days we will refax.  If another three days goes by with no decision will call the insurance for status.

## 2025-03-13 NOTE — TELEPHONE ENCOUNTER
The medication was DENIED; DENIAL letter is uploaded to MEDIA.    Generic Denial: Drug is not covered by the plan ( Plan Exclusion)       If you want an APPEAL; please note in this encounter what new information you would like to APPEAL with.  Once complete route back to PA POOL.    If this requires a response please respond to the pool ( P [facility] MEDICATION PRE-AUTH).      Thank you please advise patient.

## 2025-03-14 ENCOUNTER — TELEPHONE (OUTPATIENT)
Dept: FAMILY MEDICINE CLINIC | Age: 35
End: 2025-03-14

## 2025-03-14 NOTE — TELEPHONE ENCOUNTER
Pt calling stating that the prior authorization was denied and he is wanting to know what medication you suggest he be placed on. Please advise.

## 2025-03-17 DIAGNOSIS — R73.03 PREDIABETES: ICD-10-CM

## 2025-03-17 DIAGNOSIS — E66.813 CLASS 3 SEVERE OBESITY DUE TO EXCESS CALORIES WITH SERIOUS COMORBIDITY AND BODY MASS INDEX (BMI) OF 50.0 TO 59.9 IN ADULT: ICD-10-CM

## 2025-03-17 DIAGNOSIS — E66.01 CLASS 3 SEVERE OBESITY DUE TO EXCESS CALORIES WITH SERIOUS COMORBIDITY AND BODY MASS INDEX (BMI) OF 50.0 TO 59.9 IN ADULT: ICD-10-CM

## 2025-03-17 DIAGNOSIS — E88.819 INSULIN RESISTANCE: ICD-10-CM

## 2025-03-17 DIAGNOSIS — G47.33 SEVERE OBSTRUCTIVE SLEEP APNEA: ICD-10-CM

## 2025-03-17 DIAGNOSIS — E66.813 CLASS 3 SEVERE OBESITY DUE TO EXCESS CALORIES WITH SERIOUS COMORBIDITY AND BODY MASS INDEX (BMI) OF 50.0 TO 59.9 IN ADULT: Primary | ICD-10-CM

## 2025-03-17 DIAGNOSIS — E66.01 CLASS 3 SEVERE OBESITY DUE TO EXCESS CALORIES WITH SERIOUS COMORBIDITY AND BODY MASS INDEX (BMI) OF 50.0 TO 59.9 IN ADULT: Primary | ICD-10-CM

## 2025-03-17 NOTE — TELEPHONE ENCOUNTER
RN called patient.  Referral sent to Yue Trejo MD ,[facility]   Patient sent [employer] message with referral and phone # [phone].  Patient requesting Semaglutide to be sent to [facility].

## 2025-03-18 ENCOUNTER — NURSE ONLY (OUTPATIENT)
Dept: FAMILY MEDICINE CLINIC | Age: 35
End: 2025-03-18
Payer: COMMERCIAL

## 2025-03-18 ENCOUNTER — RESULTS FOLLOW-UP (OUTPATIENT)
Dept: FAMILY MEDICINE CLINIC | Age: 35
End: 2025-03-18

## 2025-03-18 DIAGNOSIS — Z11.1 ENCOUNTER FOR PPD TEST: Primary | ICD-10-CM

## 2025-03-18 LAB
INDURATION: NORMAL
TB SKIN TEST: NORMAL

## 2025-03-18 PROCEDURE — 86580 TB INTRADERMAL TEST: CPT | Performed by: NURSE PRACTITIONER

## 2025-03-18 NOTE — TELEPHONE ENCOUNTER
RN called and spoke to patient, Elton.    Patient states that he has no history of pancreatitis, gallstones, family history of thyroid cancer.    He will need to schedule a VV in 4 weeks and needs to weigh himself before appointment.      Patient verbalized good understanding of instructions.  VV scheduled.  Future Appointments   Date Time Provider Department Center   4/14/2025  7:00 AM Supriya Zacarias APRN - [facility] DEP   4/29/2025  8:20 AM SCHEDULE, [facility] [facility] DEP   9/9/2025  8:00 AM [facility] [place] [facility]

## 2025-03-20 LAB
INDURATION: NORMAL
TB SKIN TEST: NORMAL

## 2025-03-25 ENCOUNTER — TELEPHONE (OUTPATIENT)
Dept: FAMILY MEDICINE CLINIC | Age: 35
End: 2025-03-25

## 2025-03-25 NOTE — TELEPHONE ENCOUNTER
RN called patient to follow upon Wegovy.    Rx sent 3/18/25 to   [facility] [place] [place] - [address] [place] [address] - P [phone] - F [phone]  [address] [place] [address]  Phone: [phone]  Fax: [phone]       Patient states that he has spoke to the pharmacy almost daily and is supposed to call back today at 5 pm to see if it will be ready for  tonight or tomorrow.     RN instructed patient to contact PCP office when he picks up medication so appointments can be adjusted if needed.

## 2025-04-14 ENCOUNTER — TELEMEDICINE (OUTPATIENT)
Dept: FAMILY MEDICINE CLINIC | Age: 35
End: 2025-04-14
Payer: COMMERCIAL

## 2025-04-14 DIAGNOSIS — R73.03 PREDIABETES: ICD-10-CM

## 2025-04-14 DIAGNOSIS — E88.819 INSULIN RESISTANCE: ICD-10-CM

## 2025-04-14 DIAGNOSIS — E66.813 CLASS 3 SEVERE OBESITY DUE TO EXCESS CALORIES WITH SERIOUS COMORBIDITY AND BODY MASS INDEX (BMI) OF 50.0 TO 59.9 IN ADULT: ICD-10-CM

## 2025-04-14 DIAGNOSIS — G47.33 SEVERE OBSTRUCTIVE SLEEP APNEA: ICD-10-CM

## 2025-04-14 PROCEDURE — 99213 OFFICE O/P EST LOW 20 MIN: CPT | Performed by: NURSE PRACTITIONER

## 2025-04-14 ASSESSMENT — ENCOUNTER SYMPTOMS: RESPIRATORY NEGATIVE: 1

## 2025-04-14 NOTE — PATIENT INSTRUCTIONS
[facility] [place] 7 am - 7:30 pm  [address], [facility] [address]  [phone]    [facility] 8 am - 8 pm  [address] [place]  [address]  [phone]

## 2025-04-14 NOTE — PROGRESS NOTES
2025    TELEHEALTH EVALUATION -- Audio/Visual (During COVID-19 public health emergency)    HPI:    Elton Ross II (:  1990) has requested an audio/video evaluation for the following concern(s):    HPI    Elton presents for a virtual visit today to follow up on weight management. He went from 491 lbs to 467 lbs in one month.  He states he is taking Ozempic 0.25 mg once a week.  He is not having any nausea, abdominal pain, constipation, diarrhea.  He is drinking plenty of fluids of 1/2 gallon to one gallon of water a day.  He denies any side effects.  No personal or family history of thyroid cancer. He is active and exercising daily.  He states he feels great. He is needing a refill of his medication.  He states he feels like he has more energy. He admits to feeling full. He is not craving food much. He is scheduled next week for labs.     Review of Systems   Constitutional: Negative.    Respiratory: Negative.     Cardiovascular: Negative.    Skin: Negative.    Neurological: Negative.    Psychiatric/Behavioral: Negative.         Prior to Visit Medications    Medication Sig Taking? Authorizing Provider   Semaglutide-Weight Management (WEGOVY) 0.25 MG/0.5ML SOAJ SC injection Inject 0.25 mg into the skin every 7 days Yes Supriya Zacarias APRN - CNP   vitamin D (CHOLECALCIFEROL) 48129 UNIT CAPS Take 1 capsule by mouth once a week Yes Supriya Zacarias APRN - CNP   rosuvastatin (CRESTOR) 10 MG tablet Take 1 tablet by mouth daily Yes Supriya Zacarias APRN - CNP   triamterene-hydroCHLOROthiazide (MAXZIDE) 75-50 MG per tablet TAKE 1 TABLET BY MOUTH DAILY Yes Vasquez Salgado MD   Cholecalciferol (VITAMIN D3) 50 MCG (2000 UT) CAPS Take by mouth Yes Provider, MD Donn   PROAIR  (90 Base) MCG/ACT inhaler Inhale 1 puff into the lungs as needed for Wheezing or Shortness of Breath Yes Supriya Zacarias APRN - CNP       Social History     Tobacco Use    Smoking status: Never

## 2025-04-17 ENCOUNTER — TELEPHONE (OUTPATIENT)
Dept: FAMILY MEDICINE CLINIC | Age: 35
End: 2025-04-17

## 2025-04-17 NOTE — TELEPHONE ENCOUNTER
RN called and spoke to patient to follow up gabriela Wegovy and weight loss.    Patient is taking 0.25 mg weekly. And is having weight loss. Current weight is 465, down 26 # per patient.    Patient is very content with his weight loss journey.  RN instructed patient to continue his medication weekly and provide good nutrition to continue his success.      RN reminded patient to seek a new PCP to prevent a lapse in his health care.   Patient requested # to [facility].    # provided [phone].  RN encouraged him to call today to get on [place]'s schedule.     Future Appointments   Date Time Provider Department Center   4/29/2025  8:20 AM SCHEDULE, [facility] DEP   5/12/2025  8:00 AM Supriya Zacarias APRN - [facility] DEP   9/9/2025  8:00 AM SCHEDULE, [facility] DEP

## 2025-04-29 ENCOUNTER — TELEPHONE (OUTPATIENT)
Dept: FAMILY MEDICINE CLINIC | Age: 35
End: 2025-04-29

## 2025-04-29 ENCOUNTER — CLINICAL SUPPORT (OUTPATIENT)
Dept: FAMILY MEDICINE CLINIC | Age: 35
End: 2025-04-29
Payer: COMMERCIAL

## 2025-04-29 DIAGNOSIS — I10 UNCONTROLLED HYPERTENSION: ICD-10-CM

## 2025-04-29 DIAGNOSIS — E78.2 MIXED HYPERLIPIDEMIA: ICD-10-CM

## 2025-04-29 LAB
ALBUMIN SERPL-MCNC: 4.5 G/DL (ref 3.4–5)
ALBUMIN/GLOB SERPL: 1.4 {RATIO} (ref 1.1–2.2)
ALP SERPL-CCNC: 77 U/L (ref 40–129)
ALT SERPL-CCNC: 42 U/L (ref 10–40)
ANION GAP SERPL CALCULATED.3IONS-SCNC: 14 MMOL/L (ref 3–16)
AST SERPL-CCNC: 31 U/L (ref 15–37)
BILIRUB SERPL-MCNC: 0.5 MG/DL (ref 0–1)
BUN SERPL-MCNC: 20 MG/DL (ref 7–20)
CALCIUM SERPL-MCNC: 9.9 MG/DL (ref 8.3–10.6)
CHLORIDE SERPL-SCNC: 101 MMOL/L (ref 99–110)
CHOLEST SERPL-MCNC: 163 MG/DL (ref 0–199)
CK SERPL-CCNC: 224 U/L (ref 39–308)
CO2 SERPL-SCNC: 24 MMOL/L (ref 21–32)
CREAT SERPL-MCNC: 0.9 MG/DL (ref 0.9–1.3)
GFR SERPLBLD CREATININE-BSD FMLA CKD-EPI: >90 ML/MIN/{1.73_M2}
GLUCOSE SERPL-MCNC: 93 MG/DL (ref 70–99)
HDLC SERPL-MCNC: 24 MG/DL (ref 40–60)
LDL CHOLESTEROL: 105 MG/DL
POTASSIUM SERPL-SCNC: 4.7 MMOL/L (ref 3.5–5.1)
PROT SERPL-MCNC: 7.7 G/DL (ref 6.4–8.2)
SODIUM SERPL-SCNC: 139 MMOL/L (ref 136–145)
TRIGL SERPL-MCNC: 170 MG/DL (ref 0–150)
VLDLC SERPL CALC-MCNC: 34 MG/DL

## 2025-04-29 PROCEDURE — 36415 COLL VENOUS BLD VENIPUNCTURE: CPT | Performed by: NURSE PRACTITIONER

## 2025-04-30 ENCOUNTER — RESULTS FOLLOW-UP (OUTPATIENT)
Dept: FAMILY MEDICINE CLINIC | Age: 35
End: 2025-04-30

## 2025-05-12 ENCOUNTER — TELEMEDICINE (OUTPATIENT)
Dept: FAMILY MEDICINE CLINIC | Age: 35
End: 2025-05-12
Payer: COMMERCIAL

## 2025-05-12 DIAGNOSIS — R73.03 PREDIABETES: ICD-10-CM

## 2025-05-12 DIAGNOSIS — E88.819 INSULIN RESISTANCE: ICD-10-CM

## 2025-05-12 DIAGNOSIS — Z76.89 ENCOUNTER FOR WEIGHT MANAGEMENT: Primary | ICD-10-CM

## 2025-05-12 DIAGNOSIS — E66.813 CLASS 3 SEVERE OBESITY DUE TO EXCESS CALORIES WITH SERIOUS COMORBIDITY AND BODY MASS INDEX (BMI) OF 50.0 TO 59.9 IN ADULT (HCC): ICD-10-CM

## 2025-05-12 DIAGNOSIS — G47.33 SEVERE OBSTRUCTIVE SLEEP APNEA: ICD-10-CM

## 2025-05-12 PROCEDURE — 99213 OFFICE O/P EST LOW 20 MIN: CPT | Performed by: NURSE PRACTITIONER

## 2025-05-12 ASSESSMENT — ENCOUNTER SYMPTOMS
APNEA: 0
ALLERGIC/IMMUNOLOGIC NEGATIVE: 1
BACK PAIN: 0
ABDOMINAL PAIN: 0
WHEEZING: 0
STRIDOR: 0
PHOTOPHOBIA: 0
GASTROINTESTINAL NEGATIVE: 1
SHORTNESS OF BREATH: 0
VOMITING: 0
EYE PAIN: 0
NAUSEA: 0
EYE REDNESS: 0
SINUS PRESSURE: 0
CHEST TIGHTNESS: 0
TROUBLE SWALLOWING: 0
BLOOD IN STOOL: 0
RESPIRATORY NEGATIVE: 1
DIARRHEA: 0
RHINORRHEA: 0
SORE THROAT: 0
CONSTIPATION: 0
COLOR CHANGE: 0
CHOKING: 0
EYE DISCHARGE: 0
COUGH: 0
EYE ITCHING: 0

## 2025-05-12 NOTE — PROGRESS NOTES
2025    TELEHEALTH EVALUATION -- Audio/Visual (During COVID-19 public health emergency)    HPI:    Elton Ross II (:  1990) has requested an audio/video evaluation for the following concern(s):    HPI    Elton presents for a weight loss follow up appt.  He is now 445 pounds. He has lost 46 pounds total over two months. He states he is cutting back on portions, eating high protein and lower carbohydrates.  He denies any side effects to the Wegovy.  He is currently on Wegovy 0.25 mg once a week.  He is ready to increase to 0.5 mg once a week.  He denies any side effects with the Wegovy 0.25 mg once a week.  He denies any constipation, diarrhea, abdominal pain, nausea, and vomiting.  He is drinking plenty of water.  He is trying to increase fiber. He is trying to exercise 2 to 3 times a week for 30 to 45 minutes. He is getting the Wegovy compounded.  He denies any family history of thyroid cancer.  He denies any personal history of thyroid cancer, pancreatitis, or gallstones. He admits to feeling much better.      Review of Systems   Constitutional:  Negative for activity change, appetite change, chills, diaphoresis, fatigue, fever and unexpected weight change.   HENT: Negative.  Negative for ear pain, rhinorrhea, sinus pressure, sneezing, sore throat and trouble swallowing.    Eyes:  Negative for photophobia, pain, discharge, redness, itching and visual disturbance.   Respiratory: Negative.  Negative for apnea, cough, choking, chest tightness, shortness of breath, wheezing and stridor.    Cardiovascular:  Negative for chest pain, palpitations and leg swelling.   Gastrointestinal: Negative.  Negative for abdominal pain, blood in stool, constipation, diarrhea, nausea and vomiting.   Genitourinary: Negative.  Negative for decreased urine volume, difficulty urinating, dysuria, enuresis, flank pain, frequency, genital sores, hematuria and urgency.   Musculoskeletal: Negative.  Negative for arthralgias, back

## 2025-05-20 ENCOUNTER — TELEPHONE (OUTPATIENT)
Dept: PULMONOLOGY | Age: 35
End: 2025-05-20

## 2025-05-20 NOTE — TELEPHONE ENCOUNTER
Per reminder message, pt needs to schedule annual KIRT follow up.  Patient called with message left for patient to call back to office.

## 2025-05-23 ENCOUNTER — OFFICE VISIT (OUTPATIENT)
Dept: FAMILY MEDICINE CLINIC | Age: 35
End: 2025-05-23
Payer: COMMERCIAL

## 2025-05-23 VITALS
HEIGHT: 78 IN | DIASTOLIC BLOOD PRESSURE: 76 MMHG | WEIGHT: 315 LBS | SYSTOLIC BLOOD PRESSURE: 120 MMHG | HEART RATE: 73 BPM | OXYGEN SATURATION: 95 % | BODY MASS INDEX: 36.45 KG/M2

## 2025-05-23 DIAGNOSIS — J45.20 MILD INTERMITTENT ASTHMA WITHOUT COMPLICATION: ICD-10-CM

## 2025-05-23 DIAGNOSIS — E78.2 MIXED HYPERLIPIDEMIA: ICD-10-CM

## 2025-05-23 DIAGNOSIS — Z76.89 ENCOUNTER TO ESTABLISH CARE: Primary | ICD-10-CM

## 2025-05-23 DIAGNOSIS — I10 PRIMARY HYPERTENSION: Chronic | ICD-10-CM

## 2025-05-23 DIAGNOSIS — R73.03 PREDIABETES: ICD-10-CM

## 2025-05-23 DIAGNOSIS — E55.9 VITAMIN D DEFICIENCY: ICD-10-CM

## 2025-05-23 DIAGNOSIS — G47.33 SEVERE OBSTRUCTIVE SLEEP APNEA: Chronic | ICD-10-CM

## 2025-05-23 PROCEDURE — 3074F SYST BP LT 130 MM HG: CPT

## 2025-05-23 PROCEDURE — 99214 OFFICE O/P EST MOD 30 MIN: CPT

## 2025-05-23 PROCEDURE — 3078F DIAST BP <80 MM HG: CPT

## 2025-05-23 RX ORDER — ALBUTEROL SULFATE 90 UG/1
2 INHALANT RESPIRATORY (INHALATION) EVERY 4 HOURS PRN
Qty: 1 EACH | Refills: 2 | Status: SHIPPED | OUTPATIENT
Start: 2025-05-23 | End: 2025-06-22

## 2025-05-23 RX ORDER — ALBUTEROL SULFATE 90 UG/1
1 AEROSOL, METERED RESPIRATORY (INHALATION) PRN
Qty: 1 EACH | Refills: 1 | Status: SHIPPED | OUTPATIENT
Start: 2025-05-23 | End: 2025-05-23

## 2025-05-23 RX ORDER — ROSUVASTATIN CALCIUM 10 MG/1
10 TABLET, COATED ORAL DAILY
Qty: 90 TABLET | Refills: 1 | Status: SHIPPED | OUTPATIENT
Start: 2025-05-23

## 2025-05-23 RX ORDER — TRIAMTERENE AND HYDROCHLOROTHIAZIDE 75; 50 MG/1; MG/1
1 TABLET ORAL DAILY
Qty: 90 TABLET | Refills: 1 | Status: SHIPPED | OUTPATIENT
Start: 2025-05-23

## 2025-05-23 ASSESSMENT — ENCOUNTER SYMPTOMS
SORE THROAT: 0
SHORTNESS OF BREATH: 0
NAUSEA: 0
BLOOD IN STOOL: 0
VOMITING: 0
ALLERGIC/IMMUNOLOGIC NEGATIVE: 1
COUGH: 0
ABDOMINAL PAIN: 0
SINUS PAIN: 0
RHINORRHEA: 0
EYE DISCHARGE: 0

## 2025-05-23 NOTE — PROGRESS NOTES
file   Food Insecurity: No Food Insecurity (3/6/2025)    Hunger Vital Sign     Worried About Running Out of Food in the Last Year: Never true     Ran Out of Food in the Last Year: Never true   Transportation Needs: No Transportation Needs (3/6/2025)    PRAPARE - Transportation     Lack of Transportation (Medical): No     Lack of Transportation (Non-Medical): No   Physical Activity: Sufficiently Active (8/9/2024)    Exercise Vital Sign     Days of Exercise per Week: 5 days     Minutes of Exercise per Session: 60 min   Stress: Not on file   Social Connections: Not on file   Intimate Partner Violence: Unknown (1/22/2024)    Received from [facility], [facility]    Interpersonal Safety     Feel physically or emotionally unsafe where currently live: Not on file     Harm by anyone: Not on file     Emotionally Harmed: Not on file   Housing Stability: Low Risk  (3/6/2025)    Housing Stability Vital Sign     Unable to Pay for Housing in the Last Year: No     Number of Times Moved in the Last Year: 0     Homeless in the Last Year: No        Family History   Problem Relation Age of Onset    Atrial Fibrillation Mother     Heart Attack Mother     High Blood Pressure Mother     Immune Disorder Mother     Lupus Mother     Stroke Mother     Cancer Father        PE  Vitals:    05/23/25 1550 05/23/25 1628   BP: 120/76    BP Site: Left Lower Arm    Patient Position: Sitting    BP Cuff Size: Large Adult    Pulse: 73    SpO2: 95%    Weight: (!) 222.7 kg (491 lb) (!) 203.7 kg (449 lb)   Height: 2.007 m (6' 7\")      Estimated body mass index is 50.58 kg/m² as calculated from the following:    Height as of this encounter: 2.007 m (6' 7\").    Weight as of this encounter: 203.7 kg (449 lb).    Physical Exam  Constitutional:       Appearance: Normal appearance. He is obese.   HENT:      Right Ear: Tympanic membrane and ear canal normal.      Left Ear: Tympanic membrane and ear canal

## 2025-06-02 NOTE — TELEPHONE ENCOUNTER
Unfortunately we are unable to provide patient with a prescription for a dosage that is not recommended by the  without clear indication/need.

## 2025-06-04 NOTE — TELEPHONE ENCOUNTER
Pt states that he spoke with [facility] and they are telling him that they are just not allowed to fill it as brand and it has to be an odd dose that is not available in brand or generic form.   He said that he doesn't understand because you had discussed this during the appointment and he thought you guys had an understanding and you were going to do whatever it took to help him with this.   He states that he's a little confused.

## 2025-06-27 ENCOUNTER — TELEPHONE (OUTPATIENT)
Dept: ORTHOPEDIC SURGERY | Age: 35
End: 2025-06-27

## 2025-06-27 NOTE — TELEPHONE ENCOUNTER
----- Message from Billie S sent at 6/27/2025 11:12 AM EDT -----  Regarding: Specialist Appointment Request - Established  Specialist Appointment Request - Established    What Specialty? Select Speciality: Orthopedics     Type of Appointment: Appointment Type: Follow-Up    Reason for appointment?  OPNP R ANKLE    Scheduling Preference per Patient: JESUS GARAY    Additional Information: INQUIRING IF HE GET IN MONDAY 6-30-25 AFTER 2:15 PM  --------------------------------------------------------------------------------------------------------------------------    Relationship to Patient: Self  Call Back Information: OK to leave message on voicemail  Preferred Call Back Number:  [phone]

## 2025-07-08 ENCOUNTER — OFFICE VISIT (OUTPATIENT)
Dept: ORTHOPEDIC SURGERY | Age: 35
End: 2025-07-08

## 2025-07-08 VITALS — BODY MASS INDEX: 36.45 KG/M2 | HEIGHT: 78 IN | WEIGHT: 315 LBS

## 2025-07-08 DIAGNOSIS — M19.171 POST-TRAUMATIC OSTEOARTHRITIS OF BOTH ANKLES: ICD-10-CM

## 2025-07-08 DIAGNOSIS — M19.172 POST-TRAUMATIC OSTEOARTHRITIS OF BOTH ANKLES: ICD-10-CM

## 2025-07-08 DIAGNOSIS — S82.392A CLOSED FRACTURE OF POSTERIOR MALLEOLUS OF LEFT TIBIA, INITIAL ENCOUNTER: Primary | ICD-10-CM

## 2025-07-08 DIAGNOSIS — R52 PAIN: ICD-10-CM

## 2025-07-08 RX ORDER — BUPIVACAINE HYDROCHLORIDE 2.5 MG/ML
30 INJECTION, SOLUTION INFILTRATION; PERINEURAL ONCE
Status: COMPLETED | OUTPATIENT
Start: 2025-07-08 | End: 2025-07-08

## 2025-07-08 RX ORDER — TRIAMCINOLONE ACETONIDE 40 MG/ML
40 INJECTION, SUSPENSION INTRA-ARTICULAR; INTRAMUSCULAR ONCE
Status: COMPLETED | OUTPATIENT
Start: 2025-07-08 | End: 2025-07-08

## 2025-07-08 RX ORDER — LIDOCAINE HYDROCHLORIDE 10 MG/ML
5 INJECTION, SOLUTION INFILTRATION; PERINEURAL ONCE
Status: COMPLETED | OUTPATIENT
Start: 2025-07-08 | End: 2025-07-08

## 2025-07-08 RX ADMIN — BUPIVACAINE HYDROCHLORIDE 75 MG: 2.5 INJECTION, SOLUTION INFILTRATION; PERINEURAL at 09:39

## 2025-07-08 RX ADMIN — LIDOCAINE HYDROCHLORIDE 5 ML: 10 INJECTION, SOLUTION INFILTRATION; PERINEURAL at 09:39

## 2025-07-08 RX ADMIN — TRIAMCINOLONE ACETONIDE 40 MG: 40 INJECTION, SUSPENSION INTRA-ARTICULAR; INTRAMUSCULAR at 09:40

## 2025-07-08 NOTE — PROGRESS NOTES
Neutropenia     Congenital abnormality      Past Surgical History:   Procedure Laterality Date    SPLENECTOMY N/A 8/14/2020    SPLENECTOMY, LIVER BIOPSY performed by Josh Saleh MD at [facility] OR    SPLENECTOMY, TOTAL  08/14/2020    SPLENECTOMY, LIVER BIOPSY    TYMPANOSTOMY TUBE PLACEMENT       Family History   Problem Relation Age of Onset    Atrial Fibrillation Mother     Heart Attack Mother     High Blood Pressure Mother     Immune Disorder Mother     Lupus Mother     Stroke Mother     Cancer Father      Social History     Socioeconomic History    Marital status:    Tobacco Use    Smoking status: Never    Smokeless tobacco: Current     Types: Chew    Tobacco comments:     Chewing tobacco   Vaping Use    Vaping status: Never Used   Substance and Sexual Activity    Alcohol use: Not Currently     Comment: occassionally    Drug use: No    Sexual activity: Yes     Partners: Female     Social Drivers of Health     Food Insecurity: No Food Insecurity (3/6/2025)    Hunger Vital Sign     Worried About Running Out of Food in the Last Year: Never true     Ran Out of Food in the Last Year: Never true   Transportation Needs: No Transportation Needs (3/6/2025)    PRAPARE - Transportation     Lack of Transportation (Medical): No     Lack of Transportation (Non-Medical): No   Physical Activity: Sufficiently Active (8/9/2024)    Exercise Vital Sign     Days of Exercise per Week: 5 days     Minutes of Exercise per Session: 60 min    Received from [employer] [facility], [employer] [facility]    Interpersonal Safety   Housing Stability: Low Risk  (3/6/2025)    Housing Stability Vital Sign     Unable to Pay for Housing in the Last Year: No     Number of Times Moved in the Last Year: 0     Homeless in the Last Year: No       Review of Systems:  Relevant point review of systems dated 7/8/2025 was reviewed and all pertinent positives were reviewed and are available in the patient's

## 2025-07-30 ENCOUNTER — OFFICE VISIT (OUTPATIENT)
Dept: ORTHOPEDIC SURGERY | Age: 35
End: 2025-07-30
Payer: COMMERCIAL

## 2025-07-30 VITALS — WEIGHT: 315 LBS | BODY MASS INDEX: 36.45 KG/M2 | HEIGHT: 78 IN

## 2025-07-30 DIAGNOSIS — M19.171 POST-TRAUMATIC OSTEOARTHRITIS OF BOTH ANKLES: ICD-10-CM

## 2025-07-30 DIAGNOSIS — M19.172 POST-TRAUMATIC OSTEOARTHRITIS OF BOTH ANKLES: ICD-10-CM

## 2025-07-30 DIAGNOSIS — R52 PAIN: Primary | ICD-10-CM

## 2025-07-30 PROCEDURE — 99213 OFFICE O/P EST LOW 20 MIN: CPT | Performed by: PHYSICIAN ASSISTANT

## 2025-07-30 ASSESSMENT — SLEEP AND FATIGUE QUESTIONNAIRES
HOW LIKELY ARE YOU TO NOD OFF OR FALL ASLEEP WHEN YOU ARE A PASSENGER IN A CAR FOR AN HOUR WITHOUT A BREAK: WOULD NEVER DOZE
HOW LIKELY ARE YOU TO NOD OFF OR FALL ASLEEP WHILE SITTING AND READING: SLIGHT CHANCE OF DOZING
HOW LIKELY ARE YOU TO NOD OFF OR FALL ASLEEP WHILE LYING DOWN TO REST IN THE AFTERNOON WHEN CIRCUMSTANCES PERMIT: HIGH CHANCE OF DOZING
HOW LIKELY ARE YOU TO NOD OFF OR FALL ASLEEP WHEN YOU ARE A PASSENGER IN A CAR FOR AN HOUR WITHOUT A BREAK: WOULD NEVER DOZE
HOW LIKELY ARE YOU TO NOD OFF OR FALL ASLEEP WHILE SITTING QUIETLY AFTER LUNCH WITHOUT ALCOHOL: WOULD NEVER DOZE
HOW LIKELY ARE YOU TO NOD OFF OR FALL ASLEEP WHILE WATCHING TV: SLIGHT CHANCE OF DOZING
HOW LIKELY ARE YOU TO NOD OFF OR FALL ASLEEP WHILE WATCHING TV: SLIGHT CHANCE OF DOZING
HOW LIKELY ARE YOU TO NOD OFF OR FALL ASLEEP WHILE SITTING AND TALKING TO SOMEONE: WOULD NEVER DOZE
HOW LIKELY ARE YOU TO NOD OFF OR FALL ASLEEP WHILE SITTING QUIETLY AFTER LUNCH WITHOUT ALCOHOL: WOULD NEVER DOZE
HOW LIKELY ARE YOU TO NOD OFF OR FALL ASLEEP WHILE SITTING AND READING: SLIGHT CHANCE OF DOZING
HOW LIKELY ARE YOU TO NOD OFF OR FALL ASLEEP WHILE SITTING INACTIVE IN A PUBLIC PLACE: WOULD NEVER DOZE
ESS TOTAL SCORE: 5
HOW LIKELY ARE YOU TO NOD OFF OR FALL ASLEEP IN A CAR, WHILE STOPPED FOR A FEW MINUTES IN TRAFFIC: WOULD NEVER DOZE
HOW LIKELY ARE YOU TO NOD OFF OR FALL ASLEEP IN A CAR, WHILE STOPPED FOR A FEW MINUTES IN TRAFFIC: WOULD NEVER DOZE
HOW LIKELY ARE YOU TO NOD OFF OR FALL ASLEEP WHILE LYING DOWN TO REST IN THE AFTERNOON WHEN CIRCUMSTANCES PERMIT: HIGH CHANCE OF DOZING
HOW LIKELY ARE YOU TO NOD OFF OR FALL ASLEEP WHILE SITTING INACTIVE IN A PUBLIC PLACE: WOULD NEVER DOZE
HOW LIKELY ARE YOU TO NOD OFF OR FALL ASLEEP WHILE SITTING AND TALKING TO SOMEONE: WOULD NEVER DOZE

## 2025-07-30 NOTE — PROGRESS NOTES
Chief Complaint    No chief complaint on file.      History of Present Illness:  Elton Ross II is a 34 y.o. male who presents to the office today for follow-up visit.  We last saw the patient September 5, 2024.  He was being treated for a left ankle sprain with posterior malleolus fracture.  Date of injury was August 8, 2024.  He overall is doing fairly well but does have occasional pain still.  His pain on the left side is really occasional and not constant.  It is more over the anterior tibiotalar joint    Patient also for follow-up visit concerning his right ankle.  No injury or trauma.  Patient states \"it feels like there is glass in my ankle\".  He has had multiple injuries in the past but nothing recently.  We did give him an intra-articular cortisone injection at the last visit without any improvement.  Pain concentrated mostly over the tibiotalar joint and medial.    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    Depression 06/15/2023    The day the incident happened    Enlarged heart     Headache 06/15/2023    Hypertension     On & off, but andria notcied it staying elevated rhe oast few months    Obesity     S/P splenectomy 10/20/2020    Sleep apnea     All of my adult life     Patient Active Problem List    Diagnosis Date Noted    Venous insufficiency of both lower extremities 05/13/2024    Prediabetes 05/13/2024    Insulin resistance 05/13/2024    Severe obstructive sleep apnea 02/09/2024    Primary hypertension 10/24/2023    Varicose veins of both legs with edema 09/18/2023    Situational anxiety 09/05/2023    S/P splenectomy 10/20/2020    Hypersplenism     Hepatomegaly     Class 3 severe obesity due to excess calories with serious comorbidity and body mass index (BMI) of 50.0 to 59.9 in adult (HCC)     Anemia 08/12/2020    Splenomegaly

## 2025-07-31 ENCOUNTER — OFFICE VISIT (OUTPATIENT)
Age: 35
End: 2025-07-31
Payer: COMMERCIAL

## 2025-07-31 VITALS
DIASTOLIC BLOOD PRESSURE: 82 MMHG | HEIGHT: 78 IN | SYSTOLIC BLOOD PRESSURE: 136 MMHG | BODY MASS INDEX: 36.45 KG/M2 | HEART RATE: 93 BPM | RESPIRATION RATE: 16 BRPM | WEIGHT: 315 LBS | OXYGEN SATURATION: 99 %

## 2025-07-31 DIAGNOSIS — G47.33 SEVERE OBSTRUCTIVE SLEEP APNEA: Primary | Chronic | ICD-10-CM

## 2025-07-31 DIAGNOSIS — E66.813 CLASS 3 SEVERE OBESITY DUE TO EXCESS CALORIES WITH SERIOUS COMORBIDITY AND BODY MASS INDEX (BMI) OF 50.0 TO 59.9 IN ADULT (HCC): ICD-10-CM

## 2025-07-31 PROCEDURE — 3075F SYST BP GE 130 - 139MM HG: CPT

## 2025-07-31 PROCEDURE — 3079F DIAST BP 80-89 MM HG: CPT

## 2025-07-31 PROCEDURE — 99214 OFFICE O/P EST MOD 30 MIN: CPT

## 2025-07-31 NOTE — PATIENT INSTRUCTIONS
It was great to see you again and take care Elton!  -Christine      Never drive a car or operate a motorized vehicle while drowsy or sleepy.       Sleep Hygiene... Important practices for better sleep:  Avoid naps. This will ensure you are sleepy at bedtime.  If you have to take a nap, sleep 30 minutes or less before 3 pm.  Have a fixed bedtime and awakening time. The human body thrives on routines. Only deviate from these set times by no more than 1 hour, including on weekends.  Avoid exposure to electronics/screens within 2 hours of your desired sleep time. Light from screens inhibits melatonin production which stops our brain from helping us get to sleep.   Go to bed only when sleepy; this reduces the time you are awake in bed (which can lead to frustration and negative thoughts about sleep). If you can't fall asleep within 15-30 minutes, get up and do something boring until you feel sleepy again. Absolutely no electronic screens during this time.    Only use your bed for sleeping & intimacy. Do not use your bed as an office, workroom or recreation room.    Develop sleep rituals that you go through the same way every night.  Stay away from stimulants such as caffeine and nicotine. Caffeine can remain in your system for well over 10 hours, so no caffeine after lunch. Caffeine is found in tea, cola, coffee, cocoa and chocolate.    Avoid alcohol 2-4 hours before bedtime. As alcohol is metabolized, the result is a stimulant or \"wake-up\" effect and will cause fragmented sleep.   Regular exercise is recommended to help you deepen your sleep (and MANY other reasons), but timing is important--aim to exercise in the morning or early afternoon, not within 4 hours of your bedtime.   Don’t take worries to bed. Leave worries about life, work, school etc. behind you when you go to bed.    Ensure your bedroom is quiet and comfortable. A cool, dark room is recommended. A white noise machine can help eliminate awakenings due to

## 2025-07-31 NOTE — PROGRESS NOTES
Addended by: KATIE BUSTOS on: 12/28/2023 12:16 PM     Modules accepted: Orders     SLEEP MEDICINE PROGRESS NOTE   CC: \"I don't sleep good at night, I constantly wake up choking\"   Referring Provider:Dr. Salgado     Interval History 7/31/25:  annual  - Doing really well over the last year.  Feels fantastic.  Was on Wegovy with good success and tolerated well without side effects.  Unable to get after the compounding ability discontinued. Has continued with significant dietary modifications (portions, substitutes, quality of food intake, routines & habits, etc.), in addition to routine exercise and continues to lose weight, albeit at a much slower pace off of Wegovy.  Interested in Zepbound for KIRT.  CPAP is going great, feels much worse if he has to sleep without it.    -  KIRT treated with benefit with APAP 10-15; used 6:59 hrs avg with compliance >4 hour use 27/30 (used 28) days, residual AHI 1.7.  Pressures: median 11.8, 95th% 14.2, max 14.8.  ESS is: 5.       Interval History 4/25/24:  31-90  -  Set up with AirSense 11 3/5/24.  Things are going \"tremendous\" with CPAP.  He feels \"100% better.\"  Adjusted to it right away.  Able to get up on the first alarm, feels rested and awake upon getting up.  No longer requiring naps through the day.  Only drinking a few cups of coffee now for pleasure rather than relying on excessive amounts for stimulation.  Air pressures feel fine, ramp off.  Humidity on auto, sometimes condensation accumulates in tubing.  Nasal mask and able to keep mouth closed.  No questions or concerns today.  Snoring is resolved, no more tossing/ turning, wakes up in same position.  Takes it with him to fire [employer].   -  KIRT treated with benefit with APAP 5-15; used 5:33 hrs avg with compliance >4 hour use 21/30 (used 27) days, residual AHI 1.3.  Pressures: median 11.5, 95th% 13.8, max 14.5.  ESS: 5.       Interval History 2/9/24:  f/u HST  -  Had HST 1/11/24 demonstrating severe KIRT with AHI 40.  ESS is: 7.  Patient is not surprised by results and is looking forward to starting

## 2025-08-11 ENCOUNTER — TELEPHONE (OUTPATIENT)
Dept: PULMONOLOGY | Age: 35
End: 2025-08-11

## 2025-09-04 ENCOUNTER — TELEPHONE (OUTPATIENT)
Dept: PULMONOLOGY | Age: 35
End: 2025-09-04

## (undated) DEVICE — GLOVE ORANGE PI 8 1/2   MSG9085

## (undated) DEVICE — GAUZE,SPONGE,4"X4",8PLY,STRL,LF,10/TRAY: Brand: MEDLINE

## (undated) DEVICE — PAD,NON-ADHERENT,3X8,STERILE,LF,1/PK: Brand: MEDLINE

## (undated) DEVICE — KIT EVAC 100CC W10MMXL20CM SIL FULL PERF HUBLESS FLAT DRN

## (undated) DEVICE — SUTURE VCRL SZ 3-0 L144IN ABSRB UD LIGAPAK REEL NDL J885G

## (undated) DEVICE — ELECTRODE PT RET AD L9FT HI MOIST COND ADH HYDRGEL CORDED

## (undated) DEVICE — SUTURE VCRL SZ 3-0 L18IN ABSRB UD L26MM SH 1/2 CIR J864D

## (undated) DEVICE — SOLUTION IV IRRIG 500ML 0.9% SODIUM CHL 2F7123

## (undated) DEVICE — KIT INFUS PMP 270ML 4ML/HR 2ML/SITE SOAK CATH L5IN N NARC

## (undated) DEVICE — SHEATH INTRO 17GA L8IN TUNN DISP ON-Q

## (undated) DEVICE — TRAY CATH 16FR F INCLUDE LUB DRNGE BG STATLOK STBL DEV

## (undated) DEVICE — SPONGE LAP W18XL18IN WHT COT 4 PLY FLD STRUNG RADPQ DISP ST

## (undated) DEVICE — GOWN,AURORA,NONREINF,RAGLAN,XXL,STERILE: Brand: MEDLINE

## (undated) DEVICE — SEALER ENDOSCP NANO COAT OPN DIV CRV L JAW LIGASURE IMPACT

## (undated) DEVICE — SUTURE PROL SZ 1 L30IN NONABSORBABLE BLU CTX L48MM 1/2 CIR 8455H

## (undated) DEVICE — SUTURE VCRL SZ 2-0 L12X18IN ABSRB UD POLYGLACTIN 910 BRAID J911T

## (undated) DEVICE — MAJOR SET UP PK

## (undated) DEVICE — APPLICATOR PREP 26ML 0.7% IOD POVACRYLEX 74% ISO ALC ST

## (undated) DEVICE — MAX-CORE® DISPOSABLE CORE BIOPSY INSTRUMENT, 18G X 20CM: Brand: MAX-CORE